# Patient Record
Sex: FEMALE | Race: WHITE | HISPANIC OR LATINO | Employment: PART TIME | URBAN - METROPOLITAN AREA
[De-identification: names, ages, dates, MRNs, and addresses within clinical notes are randomized per-mention and may not be internally consistent; named-entity substitution may affect disease eponyms.]

---

## 2017-01-06 ENCOUNTER — OFFICE VISIT (OUTPATIENT)
Dept: INTERNAL MEDICINE | Facility: CLINIC | Age: 67
End: 2017-01-06

## 2017-01-06 VITALS
DIASTOLIC BLOOD PRESSURE: 90 MMHG | OXYGEN SATURATION: 99 % | HEART RATE: 66 BPM | WEIGHT: 130.3 LBS | SYSTOLIC BLOOD PRESSURE: 130 MMHG | BODY MASS INDEX: 22.37 KG/M2

## 2017-01-06 DIAGNOSIS — Z79.899 HIGH RISK MEDICATION USE: ICD-10-CM

## 2017-01-06 DIAGNOSIS — G47.00 INSOMNIA, UNSPECIFIED TYPE: ICD-10-CM

## 2017-01-06 DIAGNOSIS — E78.5 HYPERLIPIDEMIA, UNSPECIFIED HYPERLIPIDEMIA TYPE: ICD-10-CM

## 2017-01-06 DIAGNOSIS — I10 ESSENTIAL HYPERTENSION: Primary | ICD-10-CM

## 2017-01-06 DIAGNOSIS — M25.552 LEFT HIP PAIN: ICD-10-CM

## 2017-01-06 LAB
ALBUMIN SERPL-MCNC: 4.1 G/DL (ref 3.2–4.8)
ALBUMIN/GLOB SERPL: 2 G/DL (ref 1.5–2.5)
ALP SERPL-CCNC: 48 U/L (ref 25–100)
ALT SERPL-CCNC: 6 U/L (ref 7–40)
AST SERPL-CCNC: 15 U/L (ref 0–33)
BILIRUB SERPL-MCNC: 0.2 MG/DL (ref 0.3–1.2)
BUN SERPL-MCNC: 27 MG/DL (ref 9–23)
BUN/CREAT SERPL: 30 (ref 7–25)
CALCIUM SERPL-MCNC: 10.3 MG/DL (ref 8.7–10.4)
CHLORIDE SERPL-SCNC: 105 MMOL/L (ref 99–109)
CO2 SERPL-SCNC: 24 MMOL/L (ref 20–31)
CREAT SERPL-MCNC: 0.9 MG/DL (ref 0.6–1.3)
GLOBULIN SER CALC-MCNC: 2.1 GM/DL
GLUCOSE SERPL-MCNC: 86 MG/DL (ref 70–100)
MAGNESIUM SERPL-MCNC: 2.2 MG/DL (ref 1.3–2.7)
POTASSIUM SERPL-SCNC: 4.4 MMOL/L (ref 3.5–5.5)
PROT SERPL-MCNC: 6.2 G/DL (ref 5.7–8.2)
SODIUM SERPL-SCNC: 141 MMOL/L (ref 132–146)

## 2017-01-06 PROCEDURE — 99213 OFFICE O/P EST LOW 20 MIN: CPT | Performed by: INTERNAL MEDICINE

## 2017-01-06 NOTE — MR AVS SNAPSHOT
Deidre Gomez   1/6/2017 12:30 PM   Office Visit    Dept Phone:  [phone]   Encounter #:  [id number]    Provider:  Aspen Salas DO   Department:  [facility] INTERNAL MEDICINE AND ENDOCRINOLOGY [place]                Your Full Care Plan              Today's Medication Changes          These changes are accurate as of: 1/6/17  1:00 PM.  If you have any questions, ask your nurse or doctor.               Medication(s)that have changed:     diclofenac 1 % gel gel   Commonly known as:  VOLTAREN   Apply daily as needed for pain   What changed:    - how to take this  - additional instructions   Changed by:  Aspen Salas DO         Stop taking medication(s)listed here:     CloNIDine 0.1 MG tablet   Commonly known as:  CATAPRES   Stopped by:  Aspen Salas DO                Where to Get Your Medications      These medications were sent to [facility] [address] [place] [address] [address] - [phone]  - [phone]   [address] [place] [address]     Phone:  [phone]     diclofenac 1 % gel gel                  Your Updated Medication List          This list is accurate as of: 1/6/17  1:00 PM.  Always use your most recent med list.                ALPRAZolam 0.5 MG tablet   Commonly known as:  XANAX   Take 1 tablet by mouth 2 (two) times a day as needed for anxiety.       azelastine 0.1 % nasal spray   Commonly known as:  ASTELIN       diclofenac 1 % gel gel   Commonly known as:  VOLTAREN   Apply daily as needed for pain       estradiol 1 MG tablet   Commonly known as:  ESTRACE   Take 1 tablet by mouth every night.       losartan 100 MG tablet   Commonly known as:  COZAAR   Take 1 tablet by mouth daily.       zolpidem 10 MG tablet   Commonly known as:  AMBIEN               We Performed the Following     Ambulatory Referral to Orthopedic Surgery     Ambulatory Referral to Sleep Medicine     Comprehensive Metabolic Panel     Magnesium          You Were Diagnosed With        Codes Comments    Essential hypertension    -  Primary ICD-10-CM: I10  ICD-9-CM: 401.9     Hyperlipidemia, unspecified hyperlipidemia type     ICD-10-CM: E78.5  ICD-9-CM: 272.4     Insomnia, unspecified type     ICD-10-CM: G47.00  ICD-9-CM: 780.52     High risk medication use     ICD-10-CM: Z79.899  ICD-9-CM: V58.69     Left hip pain     ICD-10-CM: M25.552  ICD-9-CM: 719.45       Instructions     None    Patient Instructions History      Upcoming Appointments     Visit Type Date Time Department    FOLLOW UP 2017 12:30 PM [facility] [employer]    SUBSEQUENT MEDICARE WELLNESS 3/27/2017  2:00 PM [facility] [employer] Signup     [facility] [employer] allows you to send messages to your doctor, view your test results, renew your prescriptions, schedule appointments, and more. To sign up, go to katena and click on the Sign Up Now link in the New User? box. Enter your [employer] Activation Code exactly as it appears below along with the last four digits of your Social Security Number and your Date of Birth () to complete the sign-up process. If you do not sign up before the expiration date, you must request a new code.    "[employer] Activation Code: [email or web address]  Expires: 2017  5:34 AM    If you have questions, you can email [email or web address] or call [phone] to talk to our [employer] staff. Remember, [employer] is NOT to be used for urgent needs. For medical emergencies, dial 911.               Other Info from Your Visit           Your Appointments     Mar 27, 2017  2:00 PM EDT   Subsequent Medicare Wellness with Aspen Salas DO   [facility] INTERNAL MEDICINE AND ENDOCRINOLOGY [place] (--)    [address] [id number] [phone]              Allergies     Codeine      Olmesartan      Penicillins        Reason for Visit     Hyperlipidemia     Hypertension     Hypothyroidism     Insomnia           Vital Signs      Blood Pressure Pulse Weight Oxygen Saturation Body Mass Index Smoking Status    130/90 66 130 lb 4.8 oz (59.1 kg) 99% 22.37 kg/m2 Current Every Day Smoker      Problems and Diagnoses Noted     High blood pressure    High cholesterol or triglycerides    Difficulty falling or staying asleep    High risk medication use        Left hip pain          No Longer an Issue     Underactive thyroid    Type 2 diabetes

## 2017-02-13 ENCOUNTER — TELEPHONE (OUTPATIENT)
Dept: INTERNAL MEDICINE | Facility: CLINIC | Age: 67
End: 2017-02-13

## 2017-02-13 NOTE — TELEPHONE ENCOUNTER
PT STATES THAT EVER SINCE THEY ATTEMPTED TO DO THE BIOPSY ON HER BREAST HER NIPPLE HAS BEEN HURTING SINCE AND GETTING WORSE.    ALSO SHE WENT FOR MRI OF HIP AND THEY SAID SHE HAD A CYST ON HER HIP

## 2017-02-13 NOTE — TELEPHONE ENCOUNTER
----- Message from Tiesha Hernandez sent at 2/9/2017 11:55 AM EST -----  Contact: PATIENT   RE: BREAST ISSUE SINCE AUGUST 15, 2015    PATIENT WOULD LIKE A RETURN CALL TO DISCUSS THE ISSUE SHE HAS BEEN HAVING WITH HER BREAST.     CALL BACK #[phone]

## 2017-02-14 NOTE — TELEPHONE ENCOUNTER
No redness or any signs of infection, it is just really painful, if she takes her bra off at night it hurts really bad or if someone hugs her it is painful, she had an xray done last week on [address], she was supposed to have the MRI today but had a panic attack and they megan for Friday, but she would like for us to call her in xanax so she will be able to take before the MRI

## 2017-02-15 NOTE — TELEPHONE ENCOUNTER
LVM THAT XANAX HAS BEEN SENT IN TO HER PHARMACY AND TO TRY COOL ICE COMPRESSES ON HER BREAST, IF IT DOESN'T WORK LET US KNOW

## 2017-02-16 RX ORDER — ESTRADIOL 1 MG/1
1 TABLET ORAL NIGHTLY
Qty: 30 TABLET | Refills: 3 | Status: SHIPPED | OUTPATIENT
Start: 2017-02-16 | End: 2017-03-27

## 2017-02-16 RX ORDER — LOSARTAN POTASSIUM 100 MG/1
100 TABLET ORAL DAILY
Qty: 30 TABLET | Refills: 3 | Status: SHIPPED | OUTPATIENT
Start: 2017-02-16 | End: 2017-06-28 | Stop reason: SDUPTHER

## 2017-03-27 ENCOUNTER — OFFICE VISIT (OUTPATIENT)
Dept: INTERNAL MEDICINE | Facility: CLINIC | Age: 67
End: 2017-03-27

## 2017-03-27 VITALS
HEART RATE: 62 BPM | OXYGEN SATURATION: 98 % | BODY MASS INDEX: 22.18 KG/M2 | WEIGHT: 129.2 LBS | SYSTOLIC BLOOD PRESSURE: 130 MMHG | DIASTOLIC BLOOD PRESSURE: 80 MMHG

## 2017-03-27 DIAGNOSIS — I10 ESSENTIAL HYPERTENSION: ICD-10-CM

## 2017-03-27 DIAGNOSIS — F41.9 ANXIETY: ICD-10-CM

## 2017-03-27 DIAGNOSIS — Z00.00 MEDICARE ANNUAL WELLNESS VISIT, SUBSEQUENT: Primary | ICD-10-CM

## 2017-03-27 DIAGNOSIS — Z12.11 COLON CANCER SCREENING: ICD-10-CM

## 2017-03-27 PROCEDURE — 99397 PER PM REEVAL EST PAT 65+ YR: CPT | Performed by: INTERNAL MEDICINE

## 2017-03-27 PROCEDURE — G0444 DEPRESSION SCREEN ANNUAL: HCPCS | Performed by: INTERNAL MEDICINE

## 2017-03-27 PROCEDURE — G0439 PPPS, SUBSEQ VISIT: HCPCS | Performed by: INTERNAL MEDICINE

## 2017-03-27 PROCEDURE — 96160 PT-FOCUSED HLTH RISK ASSMT: CPT | Performed by: INTERNAL MEDICINE

## 2017-03-27 RX ORDER — CHLORAL HYDRATE 500 MG
CAPSULE ORAL
COMMUNITY
End: 2019-07-24

## 2017-03-27 RX ORDER — NEBIVOLOL HYDROCHLORIDE 2.5 MG/1
TABLET ORAL
COMMUNITY
Start: 2017-03-14 | End: 2017-11-29

## 2017-03-27 RX ORDER — ALPRAZOLAM 0.5 MG/1
0.5 TABLET ORAL 2 TIMES DAILY PRN
Qty: 60 TABLET | Refills: 2 | OUTPATIENT
Start: 2017-03-27 | End: 2018-03-21 | Stop reason: SDUPTHER

## 2017-03-27 NOTE — PROGRESS NOTES
ANNUAL WELLNESS VISIT    DRUG AND ALCOHOL USE      tobacco use: Smoked 1 packs per day for 50 years, 1 cup of caff coffee daily    DIET AND PHYSICAL ACTIVITY     Diet: low sodium    Exercise: never   Exercise Details: does not exercise due to hip pain     MOOD DISORDER AND COGNITIVE SCREENING   Depression Screening Tool Used yes - see PHQ-9   Anxiety Screening Tool Used yes     Mini-Cog Performed   Yes    1. Tell Patient 3 Words apple table deedee    2. Administer Clock Test     3. Recall 3 words  apple table deedee    4. Number Correct Items 3    FUNCTIONAL ABILITY AND LEVEL OF SAFETY   Hearing moderate hearing loss     Wears Hearing Aids No       Current Activities Independent      difficulty with walking  - see Funct/Cog Status Intake     Fall Risk Assessment       Has difficulty with walking or balance  Yes         Timed Up and Go (TUG) Test  8 sec.       If >12 sec, normal    ADVANCED DIRECTIVE has an advanced directive - a copy HAS NOT been provided. Have asked the patient to send this to us to add to record    PAIN SCREENING Do you have pain right now? no      If so, 1-10 scale: 0          Do you have pain every day? Yes      Probable chronic pain: Yes     Recent Hospitalizations:  No recent hospitalization(s)..     MEDICATION REVIEW   - updated and reviewed (see Medication List).   - reviewed for potentially harmful drug-disease interactions in the elderly.   - reviewed for high risk medications in the elderly.   - aspirin use: No             Chief Complaint   Patient presents with   • Subsequent Medicare Wellness       History of Present Illness  66 y.o.  presents for Wellness    Review of Systems   Constitutional: Negative for activity change, appetite change, chills, diaphoresis, fatigue, fever and unexpected weight change.   HENT: Negative for congestion, ear discharge, ear pain, mouth sores, nosebleeds, sinus pressure, sneezing and sore throat.    Eyes: Negative for pain, discharge and itching.    Respiratory: Negative for cough, chest tightness, shortness of breath and wheezing.    Cardiovascular: Negative for chest pain, palpitations and leg swelling.   Gastrointestinal: Negative for abdominal pain, constipation, diarrhea, nausea and vomiting.   Endocrine: Negative for cold intolerance, heat intolerance, polydipsia and polyphagia.   Genitourinary: Negative for dysuria, flank pain, frequency, hematuria and urgency.   Musculoskeletal: Negative for arthralgias, back pain, gait problem, myalgias, neck pain and neck stiffness.        Left hip pain   Skin: Negative for color change, pallor and rash.   Neurological: Negative for seizures, speech difficulty, numbness and headaches.   Psychiatric/Behavioral: Negative for agitation, confusion, decreased concentration and sleep disturbance. The patient is not nervous/anxious.     All other ROS reviewed and negative.    [facility]  The following portions of the patient's history were reviewed and updated as appropriate: allergies, current medications, past family history, past medical history, past social history, past surgical history and problem list.      Current Outpatient Prescriptions:   •  ALPRAZolam (XANAX) 0.5 MG tablet, Take 1 tablet by mouth 2 (two) times a day as needed for anxiety., Disp: 60 tablet, Rfl: 0  •  azelastine (ASTELIN) 0.1 % nasal spray, into each nostril 2 (two) times a day., Disp: , Rfl:   •  BIOTIN PO, Take  by mouth., Disp: , Rfl:   •  Cholecalciferol (VITAMIN D3) 1000 UNITS capsule, Take  by mouth., Disp: , Rfl:   •  diclofenac (VOLTAREN) 1 % gel gel, Apply daily as needed for pain, Disp: 100 g, Rfl: 1  •  losartan (COZAAR) 100 MG tablet, Take 1 tablet by mouth Daily., Disp: 30 tablet, Rfl: 3  •  Multiple Vitamins-Minerals (MULTIVITAMIN ADULT PO), Take  by mouth., Disp: , Rfl:   •  Omega-3 Fatty Acids (FISH OIL) 1000 MG capsule capsule, Take  by mouth Daily With Breakfast., Disp: , Rfl:   •  zolpidem (AMBIEN) 10 MG tablet, Take 1 tablet by  mouth every night., Disp: , Rfl:   •  BYSTOLIC 2.5 MG tablet, , Disp: , Rfl:     VITALS:  /80  Pulse 62  Wt 129 lb 3.2 oz (58.6 kg)  SpO2 98%  BMI 22.18 kg/m2    Physical Exam   Constitutional: She appears well-developed.   HENT:   Head: Normocephalic.   Right Ear: External ear normal.   Left Ear: External ear normal.   Nose: Nose normal.   Mouth/Throat: Oropharynx is clear and moist.   Eyes: Conjunctivae are normal. Pupils are equal, round, and reactive to light.   Neck: No JVD present. No thyromegaly present.   Cardiovascular: Normal rate, regular rhythm and normal heart sounds.  Exam reveals no friction rub.    No murmur heard.  Pulmonary/Chest: Effort normal and breath sounds normal. No respiratory distress. She has no wheezes. She has no rales.   Abdominal: Soft. Bowel sounds are normal. She exhibits no distension. There is no tenderness. There is no guarding.   Musculoskeletal: She exhibits no edema or tenderness.   Lymphadenopathy:     She has no cervical adenopathy.   Neurological: She displays normal reflexes. No cranial nerve deficit.   Skin: No rash noted.   Psychiatric: She has a normal mood and affect. Her behavior is normal.   Nursing note and vitals reviewed.      LABS  Results for orders placed or performed in visit on 01/06/17   Comprehensive Metabolic Panel   Result Value Ref Range    Glucose 86 70 - 100 mg/dL    BUN 27 (H) 9 - 23 mg/dL    Creatinine 0.90 0.60 - 1.30 mg/dL    eGFR Non African Am 63 >60 mL/min/1.73    eGFR African Am 76 >60 mL/min/1.73    BUN/Creatinine Ratio 30.0 (H) 7.0 - 25.0    Sodium 141 132 - 146 mmol/L    Potassium 4.4 3.5 - 5.5 mmol/L    Chloride 105 99 - 109 mmol/L    Total CO2 24.0 20.0 - 31.0 mmol/L    Calcium 10.3 8.7 - 10.4 mg/dL    Total Protein 6.2 5.7 - 8.2 g/dL    Albumin 4.10 3.20 - 4.80 g/dL    Globulin 2.1 gm/dL    A/G Ratio 2.0 1.5 - 2.5 g/dL    Total Bilirubin 0.2 (L) 0.3 - 1.2 mg/dL    Alkaline Phosphatase 48 25 - 100 U/L    AST (SGOT) 15 0 - 33 U/L     ALT (SGPT) 6 (L) 7 - 40 U/L   Magnesium   Result Value Ref Range    Magnesium 2.2 1.3 - 2.7 mg/dL       ASSESSMENT/PLAN  Problem List Items Addressed This Visit     None      Visit Diagnoses     Medicare annual wellness visit, subsequent    -  Primary          FOLLOW-UP  RTC  3months

## 2017-03-28 ENCOUNTER — CONSULT (OUTPATIENT)
Dept: SLEEP MEDICINE | Facility: HOSPITAL | Age: 67
End: 2017-03-28

## 2017-03-28 VITALS
WEIGHT: 128.4 LBS | OXYGEN SATURATION: 97 % | HEART RATE: 56 BPM | DIASTOLIC BLOOD PRESSURE: 74 MMHG | HEIGHT: 65 IN | BODY MASS INDEX: 21.39 KG/M2 | SYSTOLIC BLOOD PRESSURE: 144 MMHG

## 2017-03-28 DIAGNOSIS — R29.818 SUSPECTED SLEEP APNEA: ICD-10-CM

## 2017-03-28 DIAGNOSIS — F51.04 CHRONIC INSOMNIA: Primary | ICD-10-CM

## 2017-03-28 DIAGNOSIS — G47.10 HYPERSOMNIA: ICD-10-CM

## 2017-03-28 DIAGNOSIS — G25.81 RESTLESS LEGS SYNDROME (RLS): ICD-10-CM

## 2017-03-28 DIAGNOSIS — G47.61 PERIODIC LIMB MOVEMENT DISORDER (PLMD): ICD-10-CM

## 2017-03-28 PROCEDURE — 99204 OFFICE O/P NEW MOD 45 MIN: CPT | Performed by: INTERNAL MEDICINE
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
## 2017-04-14 RX ORDER — ZOLPIDEM TARTRATE 10 MG/1
TABLET ORAL
Qty: 30 TABLET | Refills: 3 | OUTPATIENT
Start: 2017-04-14 | End: 2017-11-29 | Stop reason: SDUPTHER

## 2017-06-29 RX ORDER — LOSARTAN POTASSIUM 100 MG/1
TABLET ORAL
Qty: 30 TABLET | Refills: 0 | Status: SHIPPED | OUTPATIENT
Start: 2017-06-29 | End: 2017-07-31 | Stop reason: SDUPTHER

## 2017-07-18 ENCOUNTER — OFFICE VISIT (OUTPATIENT)
Dept: INTERNAL MEDICINE | Facility: CLINIC | Age: 67
End: 2017-07-18

## 2017-07-18 VITALS
HEART RATE: 61 BPM | SYSTOLIC BLOOD PRESSURE: 148 MMHG | OXYGEN SATURATION: 99 % | WEIGHT: 123 LBS | BODY MASS INDEX: 20.47 KG/M2 | DIASTOLIC BLOOD PRESSURE: 80 MMHG

## 2017-07-18 DIAGNOSIS — IMO0002 CYST OF NECK: Primary | ICD-10-CM

## 2017-07-18 PROCEDURE — 99213 OFFICE O/P EST LOW 20 MIN: CPT | Performed by: INTERNAL MEDICINE

## 2017-07-18 RX ORDER — METHYLPREDNISOLONE 4 MG/1
TABLET ORAL
COMMUNITY
Start: 2017-07-05 | End: 2017-11-29

## 2017-07-18 RX ORDER — METOPROLOL SUCCINATE 25 MG/1
1 TABLET, EXTENDED RELEASE ORAL DAILY
COMMUNITY
Start: 2017-07-13 | End: 2018-02-19

## 2017-07-18 RX ORDER — TRAMADOL HYDROCHLORIDE 50 MG/1
1 TABLET ORAL AS NEEDED
COMMUNITY
Start: 2017-07-06 | End: 2019-07-24

## 2017-07-18 RX ORDER — BUPROPION HYDROCHLORIDE 150 MG/1
150 TABLET, EXTENDED RELEASE ORAL 2 TIMES DAILY
Qty: 60 TABLET | Refills: 2 | Status: SHIPPED | OUTPATIENT
Start: 2017-07-18 | End: 2018-06-27 | Stop reason: ALTCHOICE

## 2017-07-18 RX ORDER — TIZANIDINE 2 MG/1
1 TABLET ORAL AS NEEDED
COMMUNITY
Start: 2017-04-25 | End: 2019-07-24

## 2017-07-18 NOTE — PROGRESS NOTES
Subjective   Deidre Gomez is a 66 y.o. female.   Chief Complaint   Patient presents with   • Discuss Referral     has benign cyst on back of neck would like to get it removed, discuss starting wellbutrin to quit smoking       History of Present Illness   Cyst on neck for years. It is getting bigger and wants referral to have it removed.  The following portions of the patient's history were reviewed and updated as appropriate: allergies, current medications, past family history, past medical history, past social history, past surgical history and problem list.    Review of Systems   Constitutional: Negative for activity change, appetite change, chills, diaphoresis, fatigue, fever and unexpected weight change.   HENT: Negative for congestion, ear discharge, ear pain, mouth sores, nosebleeds, sinus pressure, sneezing and sore throat.    Eyes: Negative for pain, discharge and itching.   Respiratory: Negative for cough, chest tightness, shortness of breath and wheezing.    Cardiovascular: Negative for chest pain, palpitations and leg swelling.   Gastrointestinal: Negative for abdominal pain, constipation, diarrhea, nausea and vomiting.   Endocrine: Negative for cold intolerance, heat intolerance, polydipsia and polyphagia.   Genitourinary: Negative for dysuria, flank pain, frequency, hematuria and urgency.   Musculoskeletal: Negative for arthralgias, back pain, gait problem, myalgias, neck pain and neck stiffness.   Skin: Negative for color change, pallor and rash.        Cyst on neck   Neurological: Negative for seizures, speech difficulty, numbness and headaches.   Psychiatric/Behavioral: Negative for agitation, confusion, decreased concentration and sleep disturbance. The patient is not nervous/anxious.      /80  Pulse 61  Wt 123 lb (55.8 kg)  SpO2 99%  BMI 20.47 kg/m2    Objective   Physical Exam   Constitutional: She appears well-developed.   HENT:   Head: Normocephalic.   Right Ear: External ear  normal.   Left Ear: External ear normal.   Nose: Nose normal.   Mouth/Throat: Oropharynx is clear and moist.   Eyes: Conjunctivae are normal. Pupils are equal, round, and reactive to light.   Neck: No JVD present. No thyromegaly present.   Cardiovascular: Normal rate, regular rhythm and normal heart sounds.  Exam reveals no friction rub.    No murmur heard.  Pulmonary/Chest: No respiratory distress. She has no wheezes. She has no rales.   Abdominal: She exhibits no distension. There is no tenderness. There is no guarding.   Musculoskeletal: She exhibits no edema or tenderness.   Lymphadenopathy:     She has no cervical adenopathy.   Neurological: She displays normal reflexes. No cranial nerve deficit.   Skin: No rash noted.        Psychiatric: Her behavior is normal.   Nursing note and vitals reviewed.      Assessment/Plan   Deidre was seen today for discuss referral.    Diagnoses and all orders for this visit:    Cyst of neck  -     Ambulatory Referral to Dermatology    Other orders  -     buPROPion SR (WELLBUTRIN SR) 150 MG 12 hr tablet; Take 1 tablet by mouth 2 (Two) Times a Day.    for smoking cessation

## 2017-07-24 ENCOUNTER — TELEPHONE (OUTPATIENT)
Dept: INTERNAL MEDICINE | Facility: CLINIC | Age: 67
End: 2017-07-24

## 2017-07-31 RX ORDER — LOSARTAN POTASSIUM 100 MG/1
TABLET ORAL
Qty: 30 TABLET | Refills: 0 | Status: SHIPPED | OUTPATIENT
Start: 2017-07-31 | End: 2017-07-31 | Stop reason: SDUPTHER

## 2017-07-31 RX ORDER — LOSARTAN POTASSIUM 100 MG/1
100 TABLET ORAL DAILY
Qty: 30 TABLET | Refills: 0 | Status: SHIPPED | OUTPATIENT
Start: 2017-07-31 | End: 2017-10-11 | Stop reason: SDUPTHER

## 2017-09-08 ENCOUNTER — OFFICE VISIT (OUTPATIENT)
Dept: INTERNAL MEDICINE | Facility: CLINIC | Age: 67
End: 2017-09-08

## 2017-09-08 DIAGNOSIS — Z48.02 VISIT FOR SUTURE REMOVAL: Primary | ICD-10-CM

## 2017-09-08 PROCEDURE — 99212 OFFICE O/P EST SF 10 MIN: CPT | Performed by: NURSE PRACTITIONER

## 2017-09-08 NOTE — PROGRESS NOTES
Suture Removal  Date/Time: 9/8/2017 6:42 PM  Performed by: SOFY AMES  Authorized by: SOFY AMES   Consent: Verbal consent obtained.  Consent given by: patient  Patient understanding: patient states understanding of the procedure being performed  Body area: head/neck  Location details: scalp  Wound Appearance: clean  Sutures Removed: 1  Patient tolerance: Patient tolerated the procedure well with no immediate complications  Comments: 1 running stitch removed from 3.5-4 cm incision that was well approximated, healing well, no drainage.        Patient requested suture removal from excision of sebaceous cyst that was excised 10 days ago. It has not bothered other than a little itching.  1 running stitch removed from 3.5-4 cm incision that was well approximated, healing well, no drainage.  Home care discussed.  Sofy Ames, APRN

## 2017-10-11 RX ORDER — LOSARTAN POTASSIUM 100 MG/1
TABLET ORAL
Qty: 30 TABLET | Refills: 0 | Status: SHIPPED | OUTPATIENT
Start: 2017-10-11 | End: 2017-11-09 | Stop reason: SDUPTHER

## 2017-11-09 RX ORDER — LOSARTAN POTASSIUM 100 MG/1
TABLET ORAL
Qty: 30 TABLET | Refills: 0 | Status: SHIPPED | OUTPATIENT
Start: 2017-11-09 | End: 2017-12-12 | Stop reason: SDUPTHER

## 2017-11-10 RX ORDER — ZOLPIDEM TARTRATE 10 MG/1
TABLET ORAL
Qty: 30 TABLET | Refills: 3 | OUTPATIENT
Start: 2017-11-10

## 2017-11-29 ENCOUNTER — OFFICE VISIT (OUTPATIENT)
Dept: INTERNAL MEDICINE | Facility: CLINIC | Age: 67
End: 2017-11-29

## 2017-11-29 VITALS
WEIGHT: 125 LBS | OXYGEN SATURATION: 99 % | DIASTOLIC BLOOD PRESSURE: 92 MMHG | HEIGHT: 65 IN | HEART RATE: 79 BPM | BODY MASS INDEX: 20.83 KG/M2 | SYSTOLIC BLOOD PRESSURE: 158 MMHG

## 2017-11-29 DIAGNOSIS — I10 ESSENTIAL HYPERTENSION: ICD-10-CM

## 2017-11-29 DIAGNOSIS — E78.49 OTHER HYPERLIPIDEMIA: ICD-10-CM

## 2017-11-29 DIAGNOSIS — F32.89 OTHER DEPRESSION: ICD-10-CM

## 2017-11-29 DIAGNOSIS — N64.4 BREAST PAIN, LEFT: Primary | ICD-10-CM

## 2017-11-29 PROCEDURE — 99214 OFFICE O/P EST MOD 30 MIN: CPT | Performed by: INTERNAL MEDICINE

## 2017-11-29 RX ORDER — NEBIVOLOL HYDROCHLORIDE 5 MG/1
TABLET ORAL
COMMUNITY
Start: 2017-11-03 | End: 2018-08-07 | Stop reason: SDUPTHER

## 2017-11-29 RX ORDER — ZOLPIDEM TARTRATE 10 MG/1
TABLET ORAL
Qty: 30 TABLET | Refills: 3 | Status: SHIPPED | OUTPATIENT
Start: 2017-11-29 | End: 2018-04-17 | Stop reason: SDUPTHER

## 2017-12-12 RX ORDER — LOSARTAN POTASSIUM 100 MG/1
TABLET ORAL
Qty: 30 TABLET | Refills: 2 | Status: SHIPPED | OUTPATIENT
Start: 2017-12-12 | End: 2018-03-12 | Stop reason: SDUPTHER

## 2017-12-19 ENCOUNTER — TELEPHONE (OUTPATIENT)
Dept: INTERNAL MEDICINE | Facility: CLINIC | Age: 67
End: 2017-12-19

## 2018-01-22 ENCOUNTER — OFFICE VISIT (OUTPATIENT)
Dept: INTERNAL MEDICINE | Facility: CLINIC | Age: 68
End: 2018-01-22

## 2018-01-22 VITALS
HEART RATE: 76 BPM | SYSTOLIC BLOOD PRESSURE: 144 MMHG | HEIGHT: 65 IN | OXYGEN SATURATION: 98 % | DIASTOLIC BLOOD PRESSURE: 84 MMHG | BODY MASS INDEX: 21.14 KG/M2 | WEIGHT: 126.9 LBS

## 2018-01-22 DIAGNOSIS — I10 ESSENTIAL HYPERTENSION: ICD-10-CM

## 2018-01-22 DIAGNOSIS — E83.52 HYPERCALCEMIA: ICD-10-CM

## 2018-01-22 DIAGNOSIS — E55.9 VITAMIN D DEFICIENCY: ICD-10-CM

## 2018-01-22 DIAGNOSIS — F32.89 OTHER DEPRESSION: Primary | ICD-10-CM

## 2018-01-22 PROCEDURE — 99214 OFFICE O/P EST MOD 30 MIN: CPT | Performed by: INTERNAL MEDICINE

## 2018-01-22 NOTE — PROGRESS NOTES
Subjective   Deidre Gomez is a 67 y.o. female.   Chief Complaint   Patient presents with   • Follow-up     F/u visit. Pt stated no new sx's or concerns       Hypertension   This is a chronic problem. The current episode started more than 1 year ago. Pertinent negatives include no chest pain, headaches, neck pain, palpitations or shortness of breath. The current treatment provides moderate improvement.   Depression   Visit Type: follow-up  Patient presents with the following symptoms: depressed mood.  Patient is not experiencing: confusion, decreased concentration, nervousness/anxiety, palpitations, shortness of breath, suicidal ideas, suicidal planning, thoughts of death and weight loss.     Left breast pain x 6 weeks. No trauma. Hx breast cyst. Did not have he yearly mammogram in June of this year.  Has not taken her BP meds today    The following portions of the patient's history were reviewed and updated as appropriate: allergies, current medications, past family history, past medical history, past social history, past surgical history and problem list.    Review of Systems   Constitutional: Negative for activity change, appetite change, chills, diaphoresis, fatigue, fever, unexpected weight change and weight loss.   HENT: Negative for congestion, ear discharge, ear pain, mouth sores, nosebleeds, sinus pressure, sneezing and sore throat.    Eyes: Negative for pain, discharge and itching.   Respiratory: Negative for cough, chest tightness, shortness of breath and wheezing.    Cardiovascular: Negative for chest pain, palpitations and leg swelling.   Gastrointestinal: Negative for abdominal pain, constipation, diarrhea, nausea and vomiting.   Endocrine: Negative for cold intolerance, heat intolerance, polydipsia and polyphagia.   Genitourinary: Negative for dysuria, flank pain, frequency, hematuria and urgency.   Musculoskeletal: Negative for arthralgias, back pain, gait problem, myalgias, neck pain and neck  "stiffness.   Skin: Negative for color change, pallor and rash.   Neurological: Negative for seizures, speech difficulty, numbness and headaches.   Psychiatric/Behavioral: Negative for agitation, confusion, decreased concentration, sleep disturbance and suicidal ideas. The patient is not nervous/anxious.    /84  Pulse 76  Ht 163.8 cm (64.5\")  Wt 57.6 kg (126 lb 14.4 oz)  SpO2 98%  BMI 21.45 kg/m2      Objective   Physical Exam   Constitutional: She appears well-developed.   HENT:   Head: Normocephalic.   Right Ear: External ear normal.   Left Ear: External ear normal.   Nose: Nose normal.   Mouth/Throat: Oropharynx is clear and moist.   Eyes: Conjunctivae are normal. Pupils are equal, round, and reactive to light.   Neck: No JVD present. No thyromegaly present.   Cardiovascular: Normal rate, regular rhythm and normal heart sounds.  Exam reveals no friction rub.    No murmur heard.  Pulmonary/Chest: Effort normal and breath sounds normal. No respiratory distress. She has no wheezes. She has no rales.   Abdominal: Soft. Bowel sounds are normal. She exhibits no distension. There is no tenderness. There is no guarding.   Musculoskeletal: She exhibits no edema or tenderness.   Lymphadenopathy:     She has no cervical adenopathy.   Neurological: She displays normal reflexes. No cranial nerve deficit.   Skin: No rash noted.   Psychiatric: Her behavior is normal.   Nursing note and vitals reviewed.      Assessment/Plan   Deidre was seen today for breast pain and medication problem.    Diagnoses and all orders for this visit:    Breast pain, left  -     Mammo Diagnostic Bilateral With CAD; Future    Other depression  -     Ambulatory Referral to Psychiatry  She has tried multiple meds but has \"reaction to most\". Has tolerated wellbutrin but does not do well with higher dose. She is tolerating the once a day. She feels like is doing better.   Anxiety with attacks  Using xanax once a day  Essential hypertension  Up " today but needs to take her meds.  Other hyperlipidemia  stable  Other orders  -     zolpidem (AMBIEN) 10 MG tablet; One at bedtime prn sleep  The patient has read and signed the [facility] Controlled Substance Contract.  I will continue to see patient for regular follow up appointments.  They are well controlled on their medication.  SCOTT is updated every 3 months. The patient is aware of the potential for addiction and dependence.

## 2018-01-23 LAB
25(OH)D3+25(OH)D2 SERPL-MCNC: 14.8 NG/ML
ALBUMIN SERPL-MCNC: 4.7 G/DL (ref 3.2–4.8)
ALBUMIN/GLOB SERPL: 2.8 G/DL (ref 1.5–2.5)
ALP SERPL-CCNC: 70 U/L (ref 25–100)
ALT SERPL-CCNC: 5 U/L (ref 7–40)
AST SERPL-CCNC: 19 U/L (ref 0–33)
BILIRUB SERPL-MCNC: 0.3 MG/DL (ref 0.3–1.2)
BUN SERPL-MCNC: 29 MG/DL (ref 9–23)
BUN/CREAT SERPL: 26.4 (ref 7–25)
CALCIUM SERPL-MCNC: 10 MG/DL (ref 8.7–10.4)
CHLORIDE SERPL-SCNC: 107 MMOL/L (ref 99–109)
CO2 SERPL-SCNC: 25 MMOL/L (ref 20–31)
CREAT SERPL-MCNC: 1.1 MG/DL (ref 0.6–1.3)
GLOBULIN SER CALC-MCNC: 1.7 GM/DL
GLUCOSE SERPL-MCNC: 94 MG/DL (ref 70–100)
Lab: NORMAL
POTASSIUM SERPL-SCNC: 4.3 MMOL/L (ref 3.5–5.5)
PROT SERPL-MCNC: 6.4 G/DL (ref 5.7–8.2)
SODIUM SERPL-SCNC: 140 MMOL/L (ref 132–146)

## 2018-01-25 LAB
CALCIUM SERPL-MCNC: 10.1 MG/DL (ref 8.7–10.3)
INTACT PTH: NORMAL
PTH-INTACT SERPL-MCNC: 33 PG/ML (ref 15–65)
REQUEST PROBLEM: NORMAL
SPECIMEN STATUS: NORMAL

## 2018-01-25 RX ORDER — ERGOCALCIFEROL 1.25 MG/1
CAPSULE ORAL
Qty: 8 CAPSULE | Refills: 0 | Status: SHIPPED | OUTPATIENT
Start: 2018-01-25 | End: 2018-06-27

## 2018-01-26 ENCOUNTER — TELEPHONE (OUTPATIENT)
Dept: INTERNAL MEDICINE | Facility: CLINIC | Age: 68
End: 2018-01-26

## 2018-01-26 NOTE — TELEPHONE ENCOUNTER
PT WAS PREVIOUSLY PRESCRIBED VIT D3 BUY A PREVIOUS PROVIDER. THE VIT D CALLED IN YESTERDAY WAS FOR D2. PT WOULD LIKE TO MAKE SURE THIS IS CORRECT. PT ISN'T SURE IF THIS WAS A MISTAKE AS SHE HAS ALWAYS TAKEN THE D3.

## 2018-01-31 RX ORDER — IBUPROFEN 800 MG/1
TABLET ORAL
Qty: 30 TABLET | Refills: 0 | Status: SHIPPED | OUTPATIENT
Start: 2018-01-31 | End: 2018-12-12 | Stop reason: SDUPTHER

## 2018-01-31 NOTE — TELEPHONE ENCOUNTER
"Refilled Ibuprofen 800 MG, Patient wanted it noted that she \"hardly ever takes it\" she sprained her knee and it is the only things that helps with the pain. She appreciates the RX.   RX E- scripted Per Dr. Salas VO  "

## 2018-02-14 ENCOUNTER — OFFICE VISIT (OUTPATIENT)
Dept: PSYCHIATRY | Facility: CLINIC | Age: 68
End: 2018-02-14

## 2018-02-14 DIAGNOSIS — F31.30 BIPOLAR I DISORDER, MOST RECENT EPISODE DEPRESSED (HCC): Primary | ICD-10-CM

## 2018-02-14 PROCEDURE — 90791 PSYCH DIAGNOSTIC EVALUATION: CPT | Performed by: PSYCHOLOGIST

## 2018-02-15 NOTE — PROGRESS NOTES
PROGRESS NOTE    Data:  Deidre Gomez is a 67 y.o. female who met with the undersigned for a scheduled individualoutpatient psychotherapy session from 1:00 - 1:45pm.      Clinical Maneuvering/Intervention:      Pt talked about struggling with depression and anxiety due to having bipolar disorder. She is from [place], has a history of experiences with controlling men, has a history of drug abuse, and has a history of manic and depressive episodes. A psychological evaluation was continued in order to assess past and current level of functioning. Areas assessed included, but were not limited to: perception of social support, perception of ability to face and deal with challenges in life (positive functioning), anxiety symptoms, depressive symptoms, perspective on beliefs/belief system, coping skills for stress, intelligence level, etc. Therapeutic rapport was initiated. She was open about how she tends to get along better with men than woman so therapeutic rapport may need additional work/attention. Interventions conducted today were geared towards helping the pt identify what she needs in life and what sort of help she needs in counseling. She expressed that she does not feel like she has much purpose in life. It was also noted that her self-esteem is quite low. Identifying and developing a sense of purpose in life was initiated. Education was also provided as to the nature of counseling and what to expect in subsequent sessions. A treatment plan was initiated tailored to meeting pt’s presenting needs. The pt was encouraged to return for additional sessions and agreed to do so.                Mental Status Exam  Hygiene:  good  Dress: normal  Attitude:  Cooperative   Motor Activity: normal  Speech: normal  Mood:  depressed  Affect:  congruent  Thought Processes: normal  Thought Content:  normal  Suicidal Thoughts:  not endorsed  Homicidal Thoughts:  not endorsed  Crisis Safety Plan: not needed   Hallucinations:   none      Patient's Support Network Includes:  family, friends      Progress toward goal: there is evidence to suggest that she is taking measures to improve the quality of her life as she recently got back on Wellbutrin and is starting counseling.      Functional Status: moderate      Prognosis: good with medication and psychotherapy    Assessment      The pt presented as depressed, hopeless that her life will improve, and to have a low opinion of herself. Her sense of purpose in life is also rather low.      Plan      In order to diminish symptoms of depression, the pt will taking medication to manage her mood (ongoing) and start giving more thought about her purpose in life that includes how she was to be more of service to others (this week).     Claudia Hdez, PhD, LP

## 2018-02-19 ENCOUNTER — OFFICE VISIT (OUTPATIENT)
Dept: INTERNAL MEDICINE | Facility: CLINIC | Age: 68
End: 2018-02-19

## 2018-02-19 VITALS
HEART RATE: 68 BPM | OXYGEN SATURATION: 99 % | BODY MASS INDEX: 22.32 KG/M2 | DIASTOLIC BLOOD PRESSURE: 110 MMHG | WEIGHT: 132.1 LBS | SYSTOLIC BLOOD PRESSURE: 160 MMHG

## 2018-02-19 DIAGNOSIS — Z78.0 POSTMENOPAUSAL: ICD-10-CM

## 2018-02-19 DIAGNOSIS — F32.89 OTHER DEPRESSION: ICD-10-CM

## 2018-02-19 DIAGNOSIS — I15.8 OTHER SECONDARY HYPERTENSION: Primary | ICD-10-CM

## 2018-02-19 DIAGNOSIS — I10 ESSENTIAL HYPERTENSION: ICD-10-CM

## 2018-02-19 PROCEDURE — 99214 OFFICE O/P EST MOD 30 MIN: CPT | Performed by: INTERNAL MEDICINE

## 2018-02-19 RX ORDER — METHOCARBAMOL 750 MG/1
TABLET, FILM COATED ORAL
COMMUNITY
Start: 2018-02-02 | End: 2019-07-24

## 2018-02-19 RX ORDER — HYDRALAZINE HYDROCHLORIDE 25 MG/1
25 TABLET, FILM COATED ORAL 3 TIMES DAILY
Qty: 30 TABLET | Refills: 1 | Status: SHIPPED | OUTPATIENT
Start: 2018-02-19 | End: 2018-06-04

## 2018-02-19 NOTE — PROGRESS NOTES
Subjective   Deidre Gomez is a 67 y.o. female.     Depression   Visit Type: follow-up  Patient presents with the following symptoms: depressed mood.  Patient is not experiencing: palpitations, panic, restlessness, shortness of breath, suicidal ideas, suicidal planning and thoughts of death.  Frequency of symptoms: most days     Hypertension   This is a chronic problem. The current episode started more than 1 year ago. The problem is uncontrolled. Pertinent negatives include no chest pain, headaches, neck pain, palpitations or shortness of breath. Compliance problems: has side effects with most of the antihypertesnive meds.         The following portions of the patient's history were reviewed and updated as appropriate: allergies, current medications, past family history, past medical history, past social history, past surgical history and problem list.    Review of Systems   Constitutional: Negative for activity change, appetite change, chills, diaphoresis, fatigue, fever and unexpected weight change.   HENT: Negative for congestion, ear discharge, ear pain, mouth sores, nosebleeds, sinus pressure, sneezing and sore throat.    Eyes: Negative for pain, discharge and itching.   Respiratory: Negative for cough, chest tightness, shortness of breath and wheezing.    Cardiovascular: Negative for chest pain, palpitations and leg swelling.   Gastrointestinal: Negative for abdominal pain, constipation, diarrhea, nausea and vomiting.   Endocrine: Negative for cold intolerance, heat intolerance, polydipsia and polyphagia.   Genitourinary: Negative for dysuria, flank pain, frequency, hematuria and urgency.   Musculoskeletal: Negative for arthralgias, back pain, gait problem, myalgias, neck pain and neck stiffness.   Skin: Negative for color change, pallor and rash.   Neurological: Negative for seizures, speech difficulty, numbness and headaches.   Psychiatric/Behavioral: Negative for agitation, sleep disturbance and  suicidal ideas.        Depression     BP (!) 160/110  Pulse 68  Wt 59.9 kg (132 lb 1.6 oz)  SpO2 99%  BMI 22.32 kg/m2    Objective   Physical Exam   Constitutional: She is oriented to person, place, and time. She appears well-developed.   HENT:   Head: Normocephalic.   Right Ear: External ear normal.   Left Ear: External ear normal.   Nose: Nose normal.   Mouth/Throat: Oropharynx is clear and moist.   Eyes: Conjunctivae are normal. Pupils are equal, round, and reactive to light.   Neck: No JVD present. No thyromegaly present.   Cardiovascular: Normal rate, regular rhythm and normal heart sounds.  Exam reveals no friction rub.    No murmur heard.  Pulmonary/Chest: Effort normal and breath sounds normal. No respiratory distress. She has no wheezes. She has no rales.   Abdominal: Soft. Bowel sounds are normal. She exhibits no distension. There is no tenderness. There is no guarding.   Musculoskeletal: She exhibits no edema or tenderness.   Lymphadenopathy:     She has no cervical adenopathy.   Neurological: She is alert and oriented to person, place, and time. She has normal reflexes. She displays normal reflexes. No cranial nerve deficit.   Skin: No rash noted.   Psychiatric: Her behavior is normal.   Nursing note and vitals reviewed.      Assessment/Plan   Deidre was seen today for depression and hypertension.    Diagnoses and all orders for this visit:    Other secondary hypertension  -     hydrALAZINE (APRESOLINE) 25 MG tablet; Take 1 tablet by mouth 3 (Three) Times a Day.  BP has been hard to control. Multiple medications = side effects so she will just quit taking them. She is tolerating losartan. Will add low dose of hydralazine 25 mg po qd which is one of the few meds we have not tried.  3-4 wekk f/u  Essential hypertension    Other depression    with hx bipolar. Seeing Psy. Have 4 f/u  Sessions  jeff with Dr. Hdez. Feels like her Wellbutrin is helping some.

## 2018-03-12 ENCOUNTER — HOSPITAL ENCOUNTER (OUTPATIENT)
Dept: ULTRASOUND IMAGING | Facility: HOSPITAL | Age: 68
Discharge: HOME | End: 2018-03-12

## 2018-03-12 ENCOUNTER — HOSPITAL ENCOUNTER (OUTPATIENT)
Dept: MAMMOGRAPHY | Facility: HOSPITAL | Age: 68
Discharge: HOME | End: 2018-03-12
Attending: INTERNAL MEDICINE | Admitting: INTERNAL MEDICINE

## 2018-03-12 DIAGNOSIS — N64.4 BREAST PAIN, LEFT: ICD-10-CM

## 2018-03-12 PROCEDURE — G0279 TOMOSYNTHESIS, MAMMO: HCPCS | Performed by: RADIOLOGY

## 2018-03-12 PROCEDURE — 76642 ULTRASOUND BREAST LIMITED: CPT | Performed by: RADIOLOGY

## 2018-03-12 PROCEDURE — 77066 DX MAMMO INCL CAD BI: CPT | Performed by: RADIOLOGY

## 2018-03-12 PROCEDURE — G0279 TOMOSYNTHESIS, MAMMO: HCPCS

## 2018-03-12 PROCEDURE — 76642 ULTRASOUND BREAST LIMITED: CPT

## 2018-03-12 PROCEDURE — 77066 DX MAMMO INCL CAD BI: CPT

## 2018-03-12 RX ORDER — LOSARTAN POTASSIUM 100 MG/1
TABLET ORAL
Qty: 30 TABLET | Refills: 2 | Status: SHIPPED | OUTPATIENT
Start: 2018-03-12 | End: 2018-06-04 | Stop reason: SDUPTHER

## 2018-03-16 ENCOUNTER — APPOINTMENT (OUTPATIENT)
Dept: BONE DENSITY | Facility: HOSPITAL | Age: 68
End: 2018-03-16
Attending: INTERNAL MEDICINE

## 2018-03-19 ENCOUNTER — TELEPHONE (OUTPATIENT)
Dept: INTERNAL MEDICINE | Facility: CLINIC | Age: 68
End: 2018-03-19

## 2018-03-21 ENCOUNTER — OFFICE VISIT (OUTPATIENT)
Dept: INTERNAL MEDICINE | Facility: CLINIC | Age: 68
End: 2018-03-21

## 2018-03-21 DIAGNOSIS — F41.9 ANXIETY: ICD-10-CM

## 2018-03-21 DIAGNOSIS — R92.1 BREAST CALCIFICATION SEEN ON MAMMOGRAM: ICD-10-CM

## 2018-03-21 DIAGNOSIS — I10 ESSENTIAL HYPERTENSION: Primary | ICD-10-CM

## 2018-03-21 DIAGNOSIS — E55.9 VITAMIN D DEFICIENCY: ICD-10-CM

## 2018-03-21 PROCEDURE — 99214 OFFICE O/P EST MOD 30 MIN: CPT | Performed by: INTERNAL MEDICINE

## 2018-03-21 RX ORDER — ALPRAZOLAM 0.5 MG/1
0.5 TABLET ORAL 2 TIMES DAILY PRN
Qty: 60 TABLET | Refills: 2 | OUTPATIENT
Start: 2018-03-21 | End: 2018-03-21 | Stop reason: SDUPTHER

## 2018-03-21 RX ORDER — ALPRAZOLAM 0.5 MG/1
0.5 TABLET ORAL 2 TIMES DAILY PRN
Qty: 60 TABLET | Refills: 2 | Status: SHIPPED | OUTPATIENT
Start: 2018-03-21 | End: 2018-06-27 | Stop reason: SDUPTHER

## 2018-03-21 NOTE — PROGRESS NOTES
Subjective   Deidre Gomez is a 67 y.o. female.   Chief Complaint   Patient presents with   • Hypertension       History of Present Illness   Hypertension is improving but not at goal. Given her history of severe reactions to hypertensive medications. Will continue to monitor BP . She is feeling better with vitamin d replacement.  Discussed most recent mammogram findings and that a biopsy is recommended. She had prior biopsies in the past and is reluctant to undergo another biopsy.   The following portions of the patient's history were reviewed and updated as appropriate: allergies, current medications, past family history, past medical history, past social history, past surgical history and problem list.    Review of Systems   Constitutional: Negative for activity change, appetite change, chills, diaphoresis, fatigue, fever and unexpected weight change.   HENT: Negative for congestion, ear discharge, ear pain, mouth sores, nosebleeds, sinus pressure, sneezing and sore throat.    Eyes: Negative for pain, discharge and itching.   Respiratory: Negative for cough, chest tightness, shortness of breath and wheezing.    Cardiovascular: Negative for chest pain, palpitations and leg swelling.   Gastrointestinal: Negative for abdominal pain, constipation, diarrhea, nausea and vomiting.   Endocrine: Negative for cold intolerance, heat intolerance, polydipsia and polyphagia.   Genitourinary: Negative for dysuria, flank pain, frequency, hematuria and urgency.   Musculoskeletal: Negative for arthralgias, back pain, gait problem, myalgias, neck pain and neck stiffness.   Skin: Negative for color change, pallor and rash.   Neurological: Negative for seizures, speech difficulty, numbness and headaches.   Psychiatric/Behavioral: Negative for agitation, confusion, decreased concentration and sleep disturbance. The patient is not nervous/anxious.      /84   Pulse 73   Wt 61.8 kg (136 lb 3.2 oz)   LMP  (LMP Unknown)    SpO2 99%   BMI 23.02 kg/m²     Objective   Physical Exam   Constitutional: She is oriented to person, place, and time. She appears well-developed.   HENT:   Head: Normocephalic.   Right Ear: External ear normal.   Left Ear: External ear normal.   Nose: Nose normal.   Mouth/Throat: Oropharynx is clear and moist.   Eyes: Conjunctivae are normal. Pupils are equal, round, and reactive to light.   Neck: No JVD present. No thyromegaly present.   Cardiovascular: Normal rate, regular rhythm and normal heart sounds.  Exam reveals no friction rub.    No murmur heard.  Pulmonary/Chest: Effort normal and breath sounds normal. No respiratory distress. She has no wheezes. She has no rales.   Abdominal: Soft. Bowel sounds are normal. She exhibits no distension. There is no tenderness. There is no guarding.   Musculoskeletal: She exhibits no edema or tenderness.   Lymphadenopathy:     She has no cervical adenopathy.   Neurological: She is oriented to person, place, and time. She displays normal reflexes. No cranial nerve deficit.   Skin: No rash noted.   Psychiatric: Her behavior is normal.   Nursing note and vitals reviewed.      Assessment/Plan   Deidre was seen today for hypertension.    Diagnoses and all orders for this visit:    Essential hypertension  Continue current medications. CMP and lipids at next visit  Vitamin D deficiency  -     Vitamin D 25 Hydroxy  Will adjust dosage based on level.   Breast calcification seen on mammogram    Us Breast Bilateral Limited  She does not want to have the biopsy at [facility], [place].. Wants a second opinion. Will send to Dr. Mayra Ibarra.   Result Date: 3/12/2018  Calcifications deserving of stereotactic biopsy bilaterally   BI-RADS CATEGORY:  4, SUSPICIOUS   RECOMMENDATION:  2 site stereotactic biopsy (one site each breast)  CAD was utilized.  The standard false-negative rate of mammography is between 10% and 25%. Complex patterns or increased breast density will markedly elevate the  false-negative rate of mammography.    A letter, in lay terminology, with the results of this exam was given to the patient at the time of the visit. ________________________________________________________________________ _______ Physicians Order  Stereotactic Breast Biopsy  Diagnosis: Abnormal Mammogram  This report was finalized on 3/12/2018 2:42 PM by Dr. Angley Montejo MD.      Mammo Diagnostic Digital Tomosynthesis Bilateral With Cad    Result Date: 3/12/2018  Calcifications deserving of stereotactic biopsy bilaterally   BI-RADS CATEGORY:  4, SUSPICIOUS   RECOMMENDATION:  2 site stereotactic biopsy (one site each breast)  CAD was utilized.  The standard false-negative rate of mammography is between 10% and 25%. Complex patterns or increased breast density will markedly elevate the false-negative rate of mammography.    A letter, in lay terminology, with the results of this exam was given to the patient at the time of the visit. ________________________________________________________________________ _______ Physicians Order  Stereotactic Breast Biopsy  Diagnosis: Abnormal Mammogram  This report was finalized on 3/12/2018 2:42 PM by Dr. Angely Montejo MD.

## 2018-03-23 VITALS
BODY MASS INDEX: 23.02 KG/M2 | SYSTOLIC BLOOD PRESSURE: 135 MMHG | WEIGHT: 136.2 LBS | DIASTOLIC BLOOD PRESSURE: 84 MMHG | HEART RATE: 73 BPM | OXYGEN SATURATION: 99 %

## 2018-03-23 LAB — 25(OH)D3+25(OH)D2 SERPL-MCNC: 48.8 NG/ML (ref 30–100)

## 2018-03-26 ENCOUNTER — TELEPHONE (OUTPATIENT)
Dept: INTERNAL MEDICINE | Facility: CLINIC | Age: 68
End: 2018-03-26

## 2018-04-03 ENCOUNTER — APPOINTMENT (OUTPATIENT)
Dept: BONE DENSITY | Facility: HOSPITAL | Age: 68
End: 2018-04-03
Attending: INTERNAL MEDICINE

## 2018-04-13 ENCOUNTER — TELEPHONE (OUTPATIENT)
Dept: INTERNAL MEDICINE | Facility: CLINIC | Age: 68
End: 2018-04-13

## 2018-04-13 NOTE — TELEPHONE ENCOUNTER
----- Message from Aspen Salas DO sent at 4/6/2018  8:10 AM EDT -----  Regarding: FW: 2nd Opinion Referral  Can you call Deidre and let know GURVINDER is trying to reach her so she can get a second opinion on breast biopsy.  ----- Message -----  From: Jennifer Zarco  Sent: 4/4/2018   3:22 PM  To: Aspen Salas DO  Subject: 2nd Opinion Referral                             Dr. Salas,    Just wanted to update you on this referral.  Patient was contacted on Monday 4/2/18.  They left her a vmsg & the schedulers will give her a call again.  But so far she has not been scheduled.      NCB

## 2018-04-16 ENCOUNTER — TELEPHONE (OUTPATIENT)
Dept: INTERNAL MEDICINE | Facility: CLINIC | Age: 68
End: 2018-04-16

## 2018-04-18 ENCOUNTER — TELEPHONE (OUTPATIENT)
Dept: INTERNAL MEDICINE | Facility: CLINIC | Age: 68
End: 2018-04-18

## 2018-04-18 RX ORDER — ZOLPIDEM TARTRATE 10 MG/1
TABLET ORAL
Qty: 30 TABLET | Refills: 3 | OUTPATIENT
Start: 2018-04-18 | End: 2018-08-17 | Stop reason: SDUPTHER

## 2018-04-18 NOTE — TELEPHONE ENCOUNTER
----- Message from Aspen Salas DO sent at 4/13/2018  3:31 PM EDT -----  Regarding: FW: 2nd Opinion Appointment  Call and find out why she no showed  ----- Message -----  From: Jennifer Zarco  Sent: 4/13/2018   1:59 PM  To: Aspen Salas DO  Subject: 2nd Opinion Appointment                          Dr. Salas,    The patient had an appointment for Monday April 9.  But she was a no show for the appointment.  I wanted to let you know. If you want me to do anything else with this referral just let me know.      Thanks.    NCB

## 2018-04-24 ENCOUNTER — HOSPITAL ENCOUNTER (OUTPATIENT)
Dept: BONE DENSITY | Facility: HOSPITAL | Age: 68
Discharge: HOME | End: 2018-04-24
Attending: INTERNAL MEDICINE | Admitting: INTERNAL MEDICINE

## 2018-04-24 DIAGNOSIS — Z78.0 POSTMENOPAUSAL: ICD-10-CM

## 2018-04-24 PROCEDURE — 77080 DXA BONE DENSITY AXIAL: CPT

## 2018-05-08 ENCOUNTER — TELEPHONE (OUTPATIENT)
Dept: MAMMOGRAPHY | Facility: HOSPITAL | Age: 68
End: 2018-05-08

## 2018-05-08 NOTE — TELEPHONE ENCOUNTER
Spoke to patient regarding scheduling bilateral stereo biopsies. Patient has decided not to have biopsies at this time. Patient stated she will schedule a 6 month follow up mammogram instead.

## 2018-06-04 ENCOUNTER — OFFICE VISIT (OUTPATIENT)
Dept: INTERNAL MEDICINE | Facility: CLINIC | Age: 68
End: 2018-06-04

## 2018-06-04 VITALS
WEIGHT: 135.9 LBS | DIASTOLIC BLOOD PRESSURE: 100 MMHG | BODY MASS INDEX: 22.97 KG/M2 | OXYGEN SATURATION: 99 % | HEART RATE: 57 BPM | SYSTOLIC BLOOD PRESSURE: 130 MMHG

## 2018-06-04 DIAGNOSIS — E78.5 HYPERLIPIDEMIA, UNSPECIFIED HYPERLIPIDEMIA TYPE: ICD-10-CM

## 2018-06-04 DIAGNOSIS — K21.9 GASTROESOPHAGEAL REFLUX DISEASE, ESOPHAGITIS PRESENCE NOT SPECIFIED: ICD-10-CM

## 2018-06-04 DIAGNOSIS — E55.9 VITAMIN D DEFICIENCY: ICD-10-CM

## 2018-06-04 DIAGNOSIS — I10 ESSENTIAL HYPERTENSION: Primary | ICD-10-CM

## 2018-06-04 DIAGNOSIS — F32.A MILD DEPRESSION: ICD-10-CM

## 2018-06-04 LAB — 25(OH)D3+25(OH)D2 SERPL-MCNC: 23.7 NG/ML

## 2018-06-04 PROCEDURE — 99214 OFFICE O/P EST MOD 30 MIN: CPT | Performed by: INTERNAL MEDICINE

## 2018-06-04 RX ORDER — LOSARTAN POTASSIUM 100 MG/1
100 TABLET ORAL DAILY
Qty: 30 TABLET | Refills: 3 | Status: SHIPPED | OUTPATIENT
Start: 2018-06-04 | End: 2018-09-14 | Stop reason: SDUPTHER

## 2018-06-04 NOTE — PROGRESS NOTES
Subjective   Deidre Gomez is a 67 y.o. female.   Chief Complaint   Patient presents with   • Anxiety   • Depression   • Heartburn   • Hyperlipidemia   • Hypertension       Anxiety   Patient reports no chest pain, confusion, decreased concentration, nausea, nervous/anxious behavior, palpitations or shortness of breath.     Her past medical history is significant for depression.   Depression Patient is not experiencing: confusion, decreased concentration, nervousness/anxiety, palpitations and shortness of breath.    Heartburn   She reports no abdominal pain, no chest pain, no coughing, no nausea, no sore throat or no wheezing. Pertinent negatives include no fatigue.   Hyperlipidemia   Pertinent negatives include no chest pain, myalgias or shortness of breath.   Hypertension   Associated symptoms include anxiety. Pertinent negatives include no chest pain, headaches, neck pain, palpitations or shortness of breath.      Hypertension is improving but not at goal. Given her history of severe reactions to hypertensive medications. Will continue to monitor BP . She is feeling better with vitamin d replacement.  Discussed most recent mammogram findings and that a biopsy is recommended. She had prior biopsies in the past and is reluctant to undergo another biopsy.  We had her set up for second opinion and did not go. Refuses to jeff  . She understands biopsy is recommended   The following portions of the patient's history were reviewed and updated as appropriate: allergies, current medications, past family history, past medical history, past social history, past surgical history and problem list.    Review of Systems   Constitutional: Negative for activity change, appetite change, chills, diaphoresis, fatigue, fever and unexpected weight change.   HENT: Negative for congestion, ear discharge, ear pain, mouth sores, nosebleeds, sinus pressure, sneezing and sore throat.    Eyes: Negative for pain, discharge and itching.    Respiratory: Negative for cough, chest tightness, shortness of breath and wheezing.    Cardiovascular: Negative for chest pain, palpitations and leg swelling.   Gastrointestinal: Negative for abdominal pain, constipation, diarrhea, nausea and vomiting.   Endocrine: Negative for cold intolerance, heat intolerance, polydipsia and polyphagia.   Genitourinary: Negative for dysuria, flank pain, frequency, hematuria and urgency.   Musculoskeletal: Negative for arthralgias, back pain, gait problem, myalgias, neck pain and neck stiffness.   Skin: Negative for color change, pallor and rash.   Neurological: Negative for seizures, speech difficulty, numbness and headaches.   Psychiatric/Behavioral: Negative for agitation, confusion, decreased concentration and sleep disturbance. The patient is not nervous/anxious.      /100   Pulse 57   Wt 61.6 kg (135 lb 14.4 oz)   LMP  (LMP Unknown)   SpO2 99%   BMI 22.97 kg/m²     Objective   Physical Exam   Constitutional: She is oriented to person, place, and time. She appears well-developed.   HENT:   Head: Normocephalic.   Right Ear: External ear normal.   Left Ear: External ear normal.   Nose: Nose normal.   Mouth/Throat: Oropharynx is clear and moist.   Eyes: Conjunctivae are normal. Pupils are equal, round, and reactive to light.   Neck: No JVD present. No thyromegaly present.   Cardiovascular: Normal rate, regular rhythm and normal heart sounds.  Exam reveals no friction rub.    No murmur heard.  Pulmonary/Chest: Effort normal and breath sounds normal. No respiratory distress. She has no wheezes. She has no rales.   Abdominal: Soft. Bowel sounds are normal. She exhibits no distension. There is no tenderness. There is no guarding.   Musculoskeletal: She exhibits no edema or tenderness.   Lymphadenopathy:     She has no cervical adenopathy.   Neurological: She is oriented to person, place, and time. She displays normal reflexes. No cranial nerve deficit.   Skin: No rash  noted.   Psychiatric: Her behavior is normal.   Nursing note and vitals reviewed.      Assessment/Plan   Deidre was seen today for hypertension.    Diagnoses and all orders for this visit:    Essential hypertension  Continue current medications. CMP and lipids at next visit  Hyperlipidemia  stable    GERD  stable  Anxiety  stable  Mild depression  Varies in severity   Breast calcification seen on mammogram  Refuses to have biopsy even though highly suspicious.  I set her up for second opinion and she refuses to go. She understands without biopsy, we will not know if it is cancer. If cancer and left untreated, will spread to other organs resulting in death. Still refuses to go for the biopsy.   Us Breast Bilateral Limited  She does not want to have the biopsy at [facility]. Did not show for appt with Dr. Ibarra. She understands biopsy is recommended for her abnormal mammogram. She absolutely refuses to have a biopsy . She understands that this could representt breast cancer but still will no go.   Result Date: 3/12/2018  Calcifications deserving of stereotactic biopsy bilaterally   BI-RADS CATEGORY:  4, SUSPICIOUS   RECOMMENDATION:  2 site stereotactic biopsy (one site each breast)  CAD was utilized.  The standard false-negative rate of mammography is between 10% and 25%. Complex patterns or increased breast density will markedly elevate the false-negative rate of mammography.    A letter, in lay terminology, with the results of this exam was given to the patient at the time of the visit. ________________________________________________________________________ _______ Physicians Order  Stereotactic Breast Biopsy  Diagnosis: Abnormal Mammogram  This report was finalized on 3/12/2018 2:42 PM by Dr. Angely Montejo MD.      Mammo Diagnostic Digital Tomosynthesis Bilateral With Cad    Result Date: 3/12/2018  Calcifications deserving of stereotactic biopsy bilaterally   BI-RADS CATEGORY:  4, SUSPICIOUS   RECOMMENDATION:  2  site stereotactic biopsy (one site each breast)  CAD was utilized.  The standard false-negative rate of mammography is between 10% and 25%. Complex patterns or increased breast density will markedly elevate the false-negative rate of mammography.    A letter, in lay terminology, with the results of this exam was given to the patient at the time of the visit. ________________________________________________________________________ _______ Physicians Order  Stereotactic Breast Biopsy  Diagnosis: Abnormal Mammogram  This report was finalized on 3/12/2018 2:42 PM by Dr. Angely Montejo MD.

## 2018-06-27 ENCOUNTER — OFFICE VISIT (OUTPATIENT)
Dept: INTERNAL MEDICINE | Facility: CLINIC | Age: 68
End: 2018-06-27

## 2018-06-27 VITALS
WEIGHT: 133.6 LBS | OXYGEN SATURATION: 99 % | SYSTOLIC BLOOD PRESSURE: 150 MMHG | BODY MASS INDEX: 22.58 KG/M2 | DIASTOLIC BLOOD PRESSURE: 100 MMHG | HEART RATE: 61 BPM

## 2018-06-27 DIAGNOSIS — F41.9 ANXIETY: ICD-10-CM

## 2018-06-27 DIAGNOSIS — Z00.00 MEDICARE ANNUAL WELLNESS VISIT, SUBSEQUENT: Primary | ICD-10-CM

## 2018-06-27 PROCEDURE — 99397 PER PM REEVAL EST PAT 65+ YR: CPT | Performed by: INTERNAL MEDICINE

## 2018-06-27 PROCEDURE — G0439 PPPS, SUBSEQ VISIT: HCPCS | Performed by: INTERNAL MEDICINE

## 2018-06-27 RX ORDER — ALPRAZOLAM 0.5 MG/1
0.5 TABLET ORAL 2 TIMES DAILY PRN
Qty: 60 TABLET | Refills: 2 | Status: SHIPPED | OUTPATIENT
Start: 2018-06-27 | End: 2018-11-06 | Stop reason: SDUPTHER

## 2018-06-27 RX ORDER — BUPROPION HCL 150 MG
150 TABLET, EXTENDED RELEASE 24 HR ORAL DAILY
Qty: 30 TABLET | Refills: 2 | Status: SHIPPED | OUTPATIENT
Start: 2018-06-27 | End: 2019-11-25

## 2018-06-27 NOTE — PROGRESS NOTES
QUICK REFERENCE INFORMATION:  The ABCs of the Annual Wellness Visit    Subsequent Medicare Wellness Visit    HEALTH RISK ASSESSMENT    1950    Recent Hospitalizations:  No hospitalization(s) within the last year..        Current Medical Providers:  Patient Care Team:  Aspen Salas DO as PCP - General  Aspen Salas DO as PCP - Family Medicine        Smoking Status:  History   Smoking Status   • Current Every Day Smoker   Smokeless Tobacco   • Never Used       Alcohol Consumption:  History   Alcohol Use No       Depression Screen:   PHQ-2/PHQ-9 Depression Screening 6/27/2018   Little interest or pleasure in doing things 3   Feeling down, depressed, or hopeless 3   Trouble falling or staying asleep, or sleeping too much 3   Feeling tired or having little energy 3   Poor appetite or overeating 2   Feeling bad about yourself - or that you are a failure or have let yourself or your family down 3   Trouble concentrating on things, such as reading the newspaper or watching television 3   Moving or speaking so slowly that other people could have noticed. Or the opposite - being so fidgety or restless that you have been moving around a lot more than usual 3   Thoughts that you would be better off dead, or of hurting yourself in some way 2   Total Score 25   If you checked off any problems, how difficult have these problems made it for you to do your work, take care of things at home, or get along with other people? Extremely dIfficult       Health Habits and Functional and Cognitive Screening:  Functional & Cognitive Status 6/27/2018   Do you have difficulty preparing food and eating? Yes   Do you have difficulty bathing yourself, getting dressed or grooming yourself? Yes   Do you have difficulty using the toilet? No   Do you have difficulty moving around from place to place? Yes   Do you have trouble with steps or getting out of a bed or a chair? Yes   In the past year have you fallen or experienced a near  fall? Yes   Current Diet Low Fat Diet   Dental Exam Up to date   Eye Exam Up to date   Exercise (times per week) 4 times per week   Current Exercise Activities Include Walking   Do you need help using the phone?  No   Are you deaf or do you have serious difficulty hearing?  Yes   Do you need help with transportation? No   Do you need help shopping? No   Do you need help preparing meals?  No   Do you need help with housework?  No   Do you need help with laundry? No   Do you need help taking your medications? No   Do you need help managing money? Yes   Do you ever drive or ride in a car without wearing a seat belt? No   Have you felt unusual stress, anger or loneliness in the last month? Yes   Who do you live with? Other   If you need help, do you have trouble finding someone available to you? Yes   Have you been bothered in the last four weeks by sexual problems? No   Do you have difficulty concentrating, remembering or making decisions? Yes           Does the patient have evidence of cognitive impairment? No    Aspirin use counseling: Does not need ASA (and currently is not on it)      Recent Lab Results:  CMP:  Lab Results   Component Value Date    GLU 94 01/22/2018    BUN 29 (H) 01/22/2018    CREATININE 1.10 01/22/2018    EGFRIFNONA 50 (L) 01/22/2018    EGFRIFAFRI 60 (L) 01/22/2018    BCR 26.4 (H) 01/22/2018     01/22/2018    K 4.3 01/22/2018    CO2 25.0 01/22/2018    CALCIUM 10.0 01/22/2018    CALCIUM 10.1 01/22/2018    PROTENTOTREF 6.4 01/22/2018    ALBUMIN 4.70 01/22/2018    LABGLOBREF 1.7 01/22/2018    LABIL2 2.8 (H) 01/22/2018    BILITOT 0.3 01/22/2018    ALKPHOS 70 01/22/2018    AST 19 01/22/2018    ALT 5 (L) 01/22/2018     Lipid Panel:  Lab Results   Component Value Date    TRIG 126 12/18/2015    HDL 83 (H) 12/18/2015     HbA1c:  Lab Results   Component Value Date    HGBA1C 5.2 12/18/2015       Visual Acuity:  No exam data present    Age-appropriate Screening Schedule:  Refer to the list below for  future screening recommendations based on patient's age, sex and/or medical conditions. Orders for these recommended tests are listed in the plan section. The patient has been provided with a written plan.    Health Maintenance   Topic Date Due   • ZOSTER VACCINE (1 of 2) 02/03/2020 (Originally 7/22/2000)   • TDAP/TD VACCINES (1 - Tdap) 02/23/2020 (Originally 7/22/1969)   • PNEUMOCOCCAL VACCINES (65+ LOW/MEDIUM RISK) (2 of 2 - PPSV23) 09/03/2020 (Originally 3/27/2018)   • INFLUENZA VACCINE  08/01/2018   • LIPID PANEL  06/27/2019   • HEMOGLOBIN A1C  06/27/2019   • DXA SCAN  04/24/2020   • MAMMOGRAM  06/27/2020   • COLONOSCOPY  06/28/2027        Subjective   History of Present Illness    Deidre Gomez is a 67 y.o. female who presents for an Subsequent Wellness Visit.    The following portions of the patient's history were reviewed and updated as appropriate: allergies, current medications, past family history, past medical history, past social history, past surgical history and problem list.    Outpatient Medications Prior to Visit   Medication Sig Dispense Refill   • ALPRAZolam (XANAX) 0.5 MG tablet Take 1 tablet by mouth 2 (Two) Times a Day As Needed for Anxiety. 60 tablet 2   • BIOTIN PO Take  by mouth.     • BYSTOLIC 5 MG tablet      • diclofenac (VOLTAREN) 1 % gel gel Apply daily as needed for pain 100 g 1   • ibuprofen (ADVIL,MOTRIN) 800 MG tablet TAKE ONE TABLET BY MOUTH THREE TIMES A DAY 30 tablet 0   • losartan (COZAAR) 100 MG tablet Take 1 tablet by mouth Daily. 30 tablet 3   • methocarbamol (ROBAXIN) 750 MG tablet      • Multiple Vitamins-Minerals (MULTIVITAMIN ADULT PO) Take  by mouth.     • Omega-3 Fatty Acids (FISH OIL) 1000 MG capsule capsule Take  by mouth Daily With Breakfast.     • tiZANidine (ZANAFLEX) 2 MG tablet Take 1 tablet by mouth As Needed.     • traMADol (ULTRAM) 50 MG tablet Take 1 tablet by mouth As Needed.     • zolpidem (AMBIEN) 10 MG tablet TAKE ONE TABLET BY MOUTH AT BEDTIME AS  NEEDED 30 tablet 3   • azithromycin (ZITHROMAX) 250 MG tablet Take 1 tablet by mouth Daily. Take 2 tablets the first day, then 1 tablet daily for 4 days. 6 tablet 0   • brompheniramine-pseudoephedrine-DM 30-2-10 MG/5ML syrup Take 5 mL by mouth 4 (Four) Times a Day As Needed for Congestion, Cough or Allergies. 118 mL 1   • buPROPion SR (WELLBUTRIN SR) 150 MG 12 hr tablet Take 1 tablet by mouth 2 (Two) Times a Day. 60 tablet 2   • vitamin D (ERGOCALCIFEROL) 91046 units capsule capsule 1 po once weekly for 8 weeks 8 capsule 0     No facility-administered medications prior to visit.        Patient Active Problem List   Diagnosis   • Gastroesophageal reflux disease   • Hyperlipidemia   • Essential hypertension   • Chronic insomnia   • Abnormal sensation in both ears   • Acute pharyngitis   • Acute sinusitis   • Acute URI   • Allergic rhinitis   • Anxiety   • Bipolar 1 disorder   • Contact dermatitis   • Mild depression   • Enlarged thyroid   • Fatigue   • Hair loss   • Headache   • Left breast mass   • Leg numbness   • Multinodular thyroid   • Osteoporosis   • Postmenopausal disorder   • Thyroid cyst   • Restless legs syndrome (RLS)   • Hypersomnia   • Periodic limb movement disorder (PLMD)   • Suspected sleep apnea   • Breast pain, left   • Breast calcification seen on mammogram       Advance Care Planning:  has NO advance directive - information provided to the patient today    Identification of Risk Factors:  Risk factors include: weight , cardiovascular risk, inactivity, tobacco use, alcohol use and depression.    Review of Systems   Constitutional: Negative for activity change, appetite change, chills, diaphoresis, fatigue, fever and unexpected weight change.   HENT: Negative for congestion, ear discharge, ear pain, mouth sores, nosebleeds, sinus pressure, sneezing and sore throat.    Eyes: Negative for pain, discharge and itching.   Respiratory: Negative for cough, chest tightness, shortness of breath and wheezing.     Cardiovascular: Negative for chest pain, palpitations and leg swelling.   Gastrointestinal: Negative for abdominal pain, constipation, diarrhea, nausea and vomiting.   Endocrine: Negative for cold intolerance, heat intolerance, polydipsia and polyphagia.   Genitourinary: Negative for dysuria, flank pain, frequency, hematuria and urgency.   Musculoskeletal: Negative for arthralgias, back pain, gait problem, myalgias, neck pain and neck stiffness.   Skin: Negative for color change, pallor and rash.   Neurological: Negative for seizures, speech difficulty, numbness and headaches.   Psychiatric/Behavioral: Negative for agitation, confusion, decreased concentration and sleep disturbance. The patient is not nervous/anxious.        Compared to one year ago, the patient feels her physical health is the same.  Compared to one year ago, the patient feels her mental health is the same.  /100   Pulse 61   Wt 60.6 kg (133 lb 9.6 oz)   LMP  (LMP Unknown)   SpO2 99%   BMI 22.58 kg/m²     Objective     Physical Exam   Constitutional: She is oriented to person, place, and time. She appears well-developed.   HENT:   Head: Normocephalic.   Right Ear: External ear normal.   Left Ear: External ear normal.   Nose: Nose normal.   Mouth/Throat: Oropharynx is clear and moist.   Eyes: Conjunctivae are normal. Pupils are equal, round, and reactive to light.   Neck: No JVD present. No thyromegaly present.   Cardiovascular: Normal rate, regular rhythm and normal heart sounds.  Exam reveals no friction rub.    No murmur heard.  Pulmonary/Chest: Effort normal and breath sounds normal. No respiratory distress. She has no wheezes. She has no rales.   Abdominal: Soft. Bowel sounds are normal. She exhibits no distension. There is no tenderness. There is no guarding.   Musculoskeletal: She exhibits no edema or tenderness.   Lymphadenopathy:     She has no cervical adenopathy.   Neurological: She is oriented to person, place, and time.  She displays normal reflexes. No cranial nerve deficit.   Skin: No rash noted.   Psychiatric: Her behavior is normal.   Nursing note and vitals reviewed.      Vitals:    06/27/18 1356   BP: 150/100   Pulse: 61   SpO2: 99%   Weight: 60.6 kg (133 lb 9.6 oz)       Patient's Body mass index is 22.58 kg/m². BMI is above normal parameters. Recommendations include: educational material.      Assessment/Plan   Patient Self-Management and Personalized Health Advice  The patient has been provided with information about: diet, exercise and weight management and preventive services including:   · Advance directive, Fall Risk assessment done, Fall Risk plan of care done.    Visit Diagnoses:    ICD-10-CM ICD-9-CM   1. Medicare annual wellness visit, subsequent Z00.00 V70.0   She refuses med for depression. No longer will go to a therapist.  She has no plan to hurt herself or anyone else.  Offered again for her to see Dr. Hdez but she will not go.     Orders Placed This Encounter   Procedures   • Comprehensive Metabolic Panel   • Lipid Panel   • TSH   • CBC & Differential     Order Specific Question:   Manual Differential     Answer:   No       Outpatient Encounter Prescriptions as of 6/27/2018   Medication Sig Dispense Refill   • ALPRAZolam (XANAX) 0.5 MG tablet Take 1 tablet by mouth 2 (Two) Times a Day As Needed for Anxiety. 60 tablet 2   • BIOTIN PO Take  by mouth.     • BYSTOLIC 5 MG tablet      • diclofenac (VOLTAREN) 1 % gel gel Apply daily as needed for pain 100 g 1   • ibuprofen (ADVIL,MOTRIN) 800 MG tablet TAKE ONE TABLET BY MOUTH THREE TIMES A DAY 30 tablet 0   • losartan (COZAAR) 100 MG tablet Take 1 tablet by mouth Daily. 30 tablet 3   • methocarbamol (ROBAXIN) 750 MG tablet      • Multiple Vitamins-Minerals (MULTIVITAMIN ADULT PO) Take  by mouth.     • Omega-3 Fatty Acids (FISH OIL) 1000 MG capsule capsule Take  by mouth Daily With Breakfast.     • tiZANidine (ZANAFLEX) 2 MG tablet Take 1 tablet by mouth As Needed.      • traMADol (ULTRAM) 50 MG tablet Take 1 tablet by mouth As Needed.     • zolpidem (AMBIEN) 10 MG tablet TAKE ONE TABLET BY MOUTH AT BEDTIME AS NEEDED 30 tablet 3   • WELLBUTRIN  MG 24 hr tablet Take 1 tablet by mouth Daily. 30 tablet 2   • [DISCONTINUED] azithromycin (ZITHROMAX) 250 MG tablet Take 1 tablet by mouth Daily. Take 2 tablets the first day, then 1 tablet daily for 4 days. 6 tablet 0   • [DISCONTINUED] brompheniramine-pseudoephedrine-DM 30-2-10 MG/5ML syrup Take 5 mL by mouth 4 (Four) Times a Day As Needed for Congestion, Cough or Allergies. 118 mL 1   • [DISCONTINUED] buPROPion SR (WELLBUTRIN SR) 150 MG 12 hr tablet Take 1 tablet by mouth 2 (Two) Times a Day. 60 tablet 2   • [DISCONTINUED] vitamin D (ERGOCALCIFEROL) 84656 units capsule capsule 1 po once weekly for 8 weeks 8 capsule 0     No facility-administered encounter medications on file as of 6/27/2018.        Reviewed use of high risk medication in the elderly: yes  Reviewed for potential of harmful drug interactions in the elderly: yes    Follow Up:  Return in about 1 year (around 6/27/2019) for Medicare Wellness.     An After Visit Summary and PPPS with all of these plans were given to the patient.

## 2018-06-28 LAB
ALBUMIN SERPL-MCNC: 4.44 G/DL (ref 3.2–4.8)
ALBUMIN/GLOB SERPL: 2.3 G/DL (ref 1.5–2.5)
ALP SERPL-CCNC: 72 U/L (ref 25–100)
ALT SERPL-CCNC: 10 U/L (ref 7–40)
AST SERPL-CCNC: 21 U/L (ref 0–33)
BASOPHILS # BLD AUTO: 0.02 10*3/MM3 (ref 0–0.2)
BASOPHILS NFR BLD AUTO: 0.3 % (ref 0–1)
BILIRUB SERPL-MCNC: 0.4 MG/DL (ref 0.3–1.2)
BUN SERPL-MCNC: 19 MG/DL (ref 9–23)
BUN/CREAT SERPL: 15.3 (ref 7–25)
CALCIUM SERPL-MCNC: 9.7 MG/DL (ref 8.7–10.4)
CHLORIDE SERPL-SCNC: 109 MMOL/L (ref 99–109)
CHOLEST SERPL-MCNC: 209 MG/DL (ref 0–200)
CO2 SERPL-SCNC: 27 MMOL/L (ref 20–31)
CREAT SERPL-MCNC: 1.24 MG/DL (ref 0.6–1.3)
EOSINOPHIL # BLD AUTO: 0.09 10*3/MM3 (ref 0–0.3)
EOSINOPHIL NFR BLD AUTO: 1.2 % (ref 0–3)
ERYTHROCYTE [DISTWIDTH] IN BLOOD BY AUTOMATED COUNT: 14.7 % (ref 11.3–14.5)
GLOBULIN SER CALC-MCNC: 2 GM/DL
GLUCOSE SERPL-MCNC: 101 MG/DL (ref 70–100)
HCT VFR BLD AUTO: 43.8 % (ref 34.5–44)
HDLC SERPL-MCNC: 76 MG/DL (ref 40–60)
HGB BLD-MCNC: 14.6 G/DL (ref 11.5–15.5)
IMM GRANULOCYTES # BLD: 0.03 10*3/MM3 (ref 0–0.03)
IMM GRANULOCYTES NFR BLD: 0.4 % (ref 0–0.6)
LDLC SERPL CALC-MCNC: 91 MG/DL (ref 0–100)
LYMPHOCYTES # BLD AUTO: 2.98 10*3/MM3 (ref 0.6–4.8)
LYMPHOCYTES NFR BLD AUTO: 41.2 % (ref 24–44)
MCH RBC QN AUTO: 31.1 PG (ref 27–31)
MCHC RBC AUTO-ENTMCNC: 33.3 G/DL (ref 32–36)
MCV RBC AUTO: 93.2 FL (ref 80–99)
MONOCYTES # BLD AUTO: 0.57 10*3/MM3 (ref 0–1)
MONOCYTES NFR BLD AUTO: 7.9 % (ref 0–12)
NEUTROPHILS # BLD AUTO: 3.55 10*3/MM3 (ref 1.5–8.3)
NEUTROPHILS NFR BLD AUTO: 49 % (ref 41–71)
PLATELET # BLD AUTO: 169 10*3/MM3 (ref 150–450)
POTASSIUM SERPL-SCNC: 5 MMOL/L (ref 3.5–5.5)
PROT SERPL-MCNC: 6.4 G/DL (ref 5.7–8.2)
RBC # BLD AUTO: 4.7 10*6/MM3 (ref 3.89–5.14)
SODIUM SERPL-SCNC: 141 MMOL/L (ref 132–146)
TRIGL SERPL-MCNC: 209 MG/DL (ref 0–150)
TSH SERPL DL<=0.005 MIU/L-ACNC: 2.86 MIU/ML (ref 0.35–5.35)
VLDLC SERPL CALC-MCNC: 41.8 MG/DL
WBC # BLD AUTO: 7.24 10*3/MM3 (ref 3.5–10.8)

## 2018-07-30 ENCOUNTER — TELEPHONE (OUTPATIENT)
Dept: INTERNAL MEDICINE | Facility: CLINIC | Age: 68
End: 2018-07-30

## 2018-07-30 DIAGNOSIS — G89.29 OTHER CHRONIC PAIN: Primary | ICD-10-CM

## 2018-08-07 RX ORDER — NEBIVOLOL HYDROCHLORIDE 5 MG/1
5 TABLET ORAL DAILY
Qty: 30 TABLET | Refills: 2 | Status: SHIPPED | OUTPATIENT
Start: 2018-08-07 | End: 2019-09-07 | Stop reason: SDUPTHER

## 2018-08-07 NOTE — TELEPHONE ENCOUNTER
Please call to [facility] cade.  [facility] and they will not  call this in for her    Pt number [phone]

## 2018-08-10 ENCOUNTER — TELEPHONE (OUTPATIENT)
Dept: INTERNAL MEDICINE | Facility: CLINIC | Age: 68
End: 2018-08-10

## 2018-08-10 NOTE — TELEPHONE ENCOUNTER
Pt wanted to know why there was a pain management referral in for her to see Dr Weiss, she has an appointment with Timothy, please advise

## 2018-08-17 RX ORDER — ZOLPIDEM TARTRATE 10 MG/1
TABLET ORAL
Qty: 30 TABLET | Refills: 3 | Status: CANCELLED | OUTPATIENT
Start: 2018-08-17

## 2018-08-17 RX ORDER — ZOLPIDEM TARTRATE 10 MG/1
10 TABLET ORAL NIGHTLY PRN
Qty: 30 TABLET | Refills: 3 | Status: SHIPPED | OUTPATIENT
Start: 2018-08-17 | End: 2018-08-20 | Stop reason: SDUPTHER

## 2018-08-20 ENCOUNTER — TELEPHONE (OUTPATIENT)
Dept: INTERNAL MEDICINE | Facility: CLINIC | Age: 68
End: 2018-08-20

## 2018-08-20 RX ORDER — ZOLPIDEM TARTRATE 10 MG/1
10 TABLET ORAL NIGHTLY PRN
Qty: 30 TABLET | Refills: 3 | Status: SHIPPED | OUTPATIENT
Start: 2018-08-20 | End: 2018-11-06 | Stop reason: DRUGHIGH

## 2018-08-20 RX ORDER — ZOLPIDEM TARTRATE 10 MG/1
10 TABLET ORAL NIGHTLY PRN
Qty: 30 TABLET | Refills: 3 | Status: SHIPPED | OUTPATIENT
Start: 2018-08-20 | End: 2018-08-20 | Stop reason: SDUPTHER

## 2018-09-14 RX ORDER — LOSARTAN POTASSIUM 100 MG/1
TABLET ORAL
Qty: 30 TABLET | Refills: 2 | Status: SHIPPED | OUTPATIENT
Start: 2018-09-14 | End: 2019-02-09 | Stop reason: SDUPTHER

## 2018-11-06 ENCOUNTER — OFFICE VISIT (OUTPATIENT)
Dept: INTERNAL MEDICINE | Facility: CLINIC | Age: 68
End: 2018-11-06

## 2018-11-06 VITALS
DIASTOLIC BLOOD PRESSURE: 90 MMHG | HEART RATE: 63 BPM | BODY MASS INDEX: 22.16 KG/M2 | WEIGHT: 133 LBS | OXYGEN SATURATION: 99 % | HEIGHT: 65 IN | SYSTOLIC BLOOD PRESSURE: 140 MMHG

## 2018-11-06 DIAGNOSIS — F41.9 ANXIETY: ICD-10-CM

## 2018-11-06 DIAGNOSIS — F32.A MILD DEPRESSION: ICD-10-CM

## 2018-11-06 DIAGNOSIS — I10 ESSENTIAL HYPERTENSION: Primary | ICD-10-CM

## 2018-11-06 DIAGNOSIS — K21.9 GASTROESOPHAGEAL REFLUX DISEASE, ESOPHAGITIS PRESENCE NOT SPECIFIED: ICD-10-CM

## 2018-11-06 DIAGNOSIS — E78.5 HYPERLIPIDEMIA LDL GOAL <100: ICD-10-CM

## 2018-11-06 PROBLEM — G56.03 BILATERAL CARPAL TUNNEL SYNDROME: Status: ACTIVE | Noted: 2018-11-06

## 2018-11-06 PROCEDURE — 99214 OFFICE O/P EST MOD 30 MIN: CPT | Performed by: INTERNAL MEDICINE

## 2018-11-06 RX ORDER — ALPRAZOLAM 0.5 MG/1
0.5 TABLET ORAL 2 TIMES DAILY PRN
Qty: 60 TABLET | Refills: 2 | Status: SHIPPED | OUTPATIENT
Start: 2018-11-06 | End: 2019-09-04 | Stop reason: SDUPTHER

## 2018-11-06 RX ORDER — ZOLPIDEM TARTRATE 5 MG/1
5 TABLET ORAL NIGHTLY PRN
Qty: 30 TABLET | Refills: 1
Start: 2018-11-06 | End: 2019-02-11

## 2018-11-06 NOTE — PROGRESS NOTES
Subjective   Deidre Gomez is a 68 y.o. female.   Chief Complaint   Patient presents with   • Essential hypertension     f/u    • Anxiety     f/u    • Depression     f/u    • Hyperlipidemia     f/u        Anxiety   Patient reports no chest pain, confusion, decreased concentration, nausea, nervous/anxious behavior, palpitations or shortness of breath.     Her past medical history is significant for depression.   Depression Patient is not experiencing: confusion, decreased concentration, nervousness/anxiety, palpitations and shortness of breath.    Heartburn   She reports no abdominal pain, no chest pain, no coughing, no nausea, no sore throat or no wheezing. Pertinent negatives include no fatigue.   Hyperlipidemia   Pertinent negatives include no chest pain, myalgias or shortness of breath.   Hypertension   Associated symptoms include anxiety. Pertinent negatives include no chest pain, headaches, neck pain, palpitations or shortness of breath.      Hypertension is improving but not at goal. Given her history of severe reactions to hypertensive medications. Will continue to monitor BP . She is feeling better with vitamin d replacement.  Discussed most recent mammogram findings and that a biopsy is recommended. She had prior biopsies in the past and is reluctant to undergo another biopsy.  We had her set up for second opinion and did not go. Refuses to jeff   biopsy. She understands biopsy is recommended and delaying it could result in worst prognosis.   The following portions of the patient's history were reviewed and updated as appropriate: allergies, current medications, past family history, past medical history, past social history, past surgical history and problem list.    Review of Systems   Constitutional: Negative for activity change, appetite change, chills, diaphoresis, fatigue, fever and unexpected weight change.   HENT: Negative for congestion, ear discharge, ear pain, mouth sores, nosebleeds, sinus  "pressure, sneezing and sore throat.    Eyes: Negative for pain, discharge and itching.   Respiratory: Negative for cough, chest tightness, shortness of breath and wheezing.    Cardiovascular: Negative for chest pain, palpitations and leg swelling.   Gastrointestinal: Negative for abdominal pain, constipation, diarrhea, nausea and vomiting.   Endocrine: Negative for cold intolerance, heat intolerance, polydipsia and polyphagia.   Genitourinary: Negative for dysuria, flank pain, frequency, hematuria and urgency.   Musculoskeletal: Negative for arthralgias, back pain, gait problem, myalgias, neck pain and neck stiffness.   Skin: Negative for color change, pallor and rash.   Neurological: Negative for seizures, speech difficulty, numbness and headaches.   Psychiatric/Behavioral: Negative for agitation, confusion, decreased concentration and sleep disturbance. The patient is not nervous/anxious.      /90   Pulse 63   Ht 163.8 cm (64.5\")   Wt 60.3 kg (133 lb)   LMP  (LMP Unknown)   SpO2 99%   BMI 22.48 kg/m²     Objective   Physical Exam   Constitutional: She is oriented to person, place, and time. She appears well-developed.   HENT:   Head: Normocephalic.   Right Ear: External ear normal.   Left Ear: External ear normal.   Nose: Nose normal.   Mouth/Throat: Oropharynx is clear and moist.   Eyes: Pupils are equal, round, and reactive to light. Conjunctivae are normal.   Neck: No JVD present. No thyromegaly present.   Cardiovascular: Normal rate, regular rhythm and normal heart sounds.  Exam reveals no friction rub.    No murmur heard.  Pulmonary/Chest: Effort normal and breath sounds normal. No respiratory distress. She has no wheezes. She has no rales.   Abdominal: Soft. Bowel sounds are normal. She exhibits no distension. There is no tenderness. There is no guarding.   Musculoskeletal: She exhibits no edema or tenderness.   Lymphadenopathy:     She has no cervical adenopathy.   Neurological: She is oriented " to person, place, and time. She displays normal reflexes. No cranial nerve deficit.   Skin: No rash noted.   Psychiatric: Her behavior is normal.   Nursing note and vitals reviewed.      Assessment/Plan   Deidre was seen today for hypertension.    Diagnoses and all orders for this visit:    Essential hypertension  Continue current medications. CMP and lipids at next visit  Hyperlipidemia  stable    GERD  stable  Anxiety  Stable. Tried lexapro, zoloft, buspar, atarax and xanax only med that works.   Mild depression  Varies in severity   Breast calcification seen on mammogram  Refuses to have biopsy even though highly suspicious.  I set her up for second opinion and she refuses to go. She understands without biopsy, we will not know if it is cancer. If cancer and left untreated, will spread to other organs resulting in death. Still refuses to go for the biopsy.   Us Breast Bilateral Limited  She does not want to have the biopsy at [facility]. Did not show for appt with Dr. Ibarra. She understands biopsy is recommended for her abnormal mammogram. She absolutely refuses to have a biopsy . She understands that this could representt breast cancer but still will no go.   Result Date: 3/12/2018  Calcifications deserving of stereotactic biopsy bilaterally   BI-RADS CATEGORY:  4, SUSPICIOUS   RECOMMENDATION:  2 site stereotactic biopsy (one site each breast)  CAD was utilized.  The standard false-negative rate of mammography is between 10% and 25%. Complex patterns or increased breast density will markedly elevate the false-negative rate of mammography.    A letter, in lay terminology, with the results of this exam was given to the patient at the time of the visit. ________________________________________________________________________ _______ Physicians Order  Stereotactic Breast Biopsy  Diagnosis: Abnormal Mammogram  This report was finalized on 3/12/2018 2:42 PM by Dr. Angely Montejo MD.      Attention Point  Tomosynthesis Bilateral With Cad    Result Date: 3/12/2018  Calcifications deserving of stereotactic biopsy bilaterally   BI-RADS CATEGORY:  4, SUSPICIOUS   RECOMMENDATION:  2 site stereotactic biopsy (one site each breast)  CAD was utilized.  The standard false-negative rate of mammography is between 10% and 25%. Complex patterns or increased breast density will markedly elevate the false-negative rate of mammography.    A letter, in lay terminology, with the results of this exam was given to the patient at the time of the visit. ________________________________________________________________________ _______ Physicians Order  Stereotactic Breast Biopsy  Diagnosis: Abnormal Mammogram  This report was finalized on 3/12/2018 2:42 PM by Dr. Angely Montejo MD.      The patient has read and signed the [facility] Controlled Substance Contract.  I will continue to see patient for regular follow up appointments.  They are well controlled on their medication.  SCOTT is updated every 3 months. The patient is aware of the potential for addiction and dependence.

## 2018-12-10 ENCOUNTER — TELEPHONE (OUTPATIENT)
Dept: INTERNAL MEDICINE | Facility: CLINIC | Age: 68
End: 2018-12-10

## 2018-12-12 RX ORDER — IBUPROFEN 800 MG/1
TABLET ORAL
Qty: 30 TABLET | Refills: 0 | Status: SHIPPED | OUTPATIENT
Start: 2018-12-12 | End: 2019-07-24

## 2018-12-12 NOTE — TELEPHONE ENCOUNTER
PT WANTS TO SEE IF  WILL CALL HER IN A PRESCRIPTION FOR CHANTIX; SHE WAS SEEN BACK IN November; SHE USES [facility] IN [facility]; IF ANY QUESTIONS, PLEASE CALL [phone]

## 2019-01-09 RX ORDER — ZOLPIDEM TARTRATE 10 MG/1
10 TABLET ORAL NIGHTLY PRN
Qty: 30 TABLET | Refills: 3 | Status: SHIPPED | OUTPATIENT
Start: 2019-01-09 | End: 2019-02-11 | Stop reason: SDUPTHER

## 2019-02-06 NOTE — TELEPHONE ENCOUNTER
PATIENT ALSO STATES THAT SHE HAS RECEIVED NOTIFICATION FROM HER INSURANCE PROVIDER THAT THEY WILL NOT COVER AMBIEN 10 MG TAB. BLACK IS ALLERGIC TO DYES AND THIS IS THE ONLY MEDICATION THAT DOES NOT CAUSE A RASH. SHE ALSO NEEDS A REFILL ON THIS.

## 2019-02-09 RX ORDER — LOSARTAN POTASSIUM 100 MG/1
100 TABLET ORAL DAILY
Qty: 30 TABLET | Refills: 2 | Status: SHIPPED | OUTPATIENT
Start: 2019-02-09 | End: 2019-06-22 | Stop reason: SDUPTHER

## 2019-02-09 NOTE — TELEPHONE ENCOUNTER
PT IS OUT OF THE [place]. PHARMACY IS [facility] [address]. PT CAN BE REACHED [phone]

## 2019-02-11 ENCOUNTER — OFFICE VISIT (OUTPATIENT)
Dept: INTERNAL MEDICINE | Facility: CLINIC | Age: 69
End: 2019-02-11

## 2019-02-11 VITALS
RESPIRATION RATE: 16 BRPM | SYSTOLIC BLOOD PRESSURE: 132 MMHG | WEIGHT: 131 LBS | HEART RATE: 84 BPM | BODY MASS INDEX: 21.83 KG/M2 | OXYGEN SATURATION: 98 % | HEIGHT: 65 IN | DIASTOLIC BLOOD PRESSURE: 72 MMHG

## 2019-02-11 DIAGNOSIS — F51.01 PRIMARY INSOMNIA: Primary | ICD-10-CM

## 2019-02-11 DIAGNOSIS — G89.29 CHRONIC HIP PAIN, LEFT: ICD-10-CM

## 2019-02-11 DIAGNOSIS — F32.9 MAJOR DEPRESSION, CHRONIC: ICD-10-CM

## 2019-02-11 DIAGNOSIS — M25.552 CHRONIC HIP PAIN, LEFT: ICD-10-CM

## 2019-02-11 DIAGNOSIS — F41.9 ANXIETY: ICD-10-CM

## 2019-02-11 DIAGNOSIS — I10 ESSENTIAL HYPERTENSION: ICD-10-CM

## 2019-02-11 DIAGNOSIS — R73.9 HYPERGLYCEMIA: ICD-10-CM

## 2019-02-11 DIAGNOSIS — E78.5 HYPERLIPIDEMIA LDL GOAL <100: ICD-10-CM

## 2019-02-11 DIAGNOSIS — K21.9 GASTROESOPHAGEAL REFLUX DISEASE, ESOPHAGITIS PRESENCE NOT SPECIFIED: ICD-10-CM

## 2019-02-11 PROCEDURE — 99214 OFFICE O/P EST MOD 30 MIN: CPT | Performed by: INTERNAL MEDICINE

## 2019-02-11 RX ORDER — ZOLPIDEM TARTRATE 10 MG/1
10 TABLET ORAL NIGHTLY PRN
Qty: 30 TABLET | Refills: 3 | Status: CANCELLED | OUTPATIENT
Start: 2019-02-11

## 2019-02-11 RX ORDER — ZOLPIDEM TARTRATE 10 MG/1
10 TABLET ORAL NIGHTLY PRN
Qty: 30 TABLET | Refills: 3 | Status: SHIPPED | OUTPATIENT
Start: 2019-02-11 | End: 2019-09-04 | Stop reason: SDUPTHER

## 2019-02-11 NOTE — PROGRESS NOTES
Subjective   Deidre Gomez is a 68 y.o. female.   Chief Complaint   Patient presents with   • Hypertension       Anxiety   Patient reports no chest pain, confusion, decreased concentration, nausea, nervous/anxious behavior, palpitations or shortness of breath.     Her past medical history is significant for depression.   Depression Patient is not experiencing: confusion, decreased concentration, nervousness/anxiety, palpitations and shortness of breath.    Hyperlipidemia   Pertinent negatives include no chest pain, myalgias or shortness of breath.   Heartburn   She reports no abdominal pain, no chest pain, no coughing, no nausea, no sore throat or no wheezing. Associated symptoms include fatigue.   Hypertension   Associated symptoms include anxiety. Pertinent negatives include no chest pain, headaches, neck pain, palpitations or shortness of breath.      Hypertension is improving but not at goal. Given her history of severe reactions to hypertensive medications. Will continue to monitor BP . She is feeling better with vitamin d replacement. Hip pain  That requires pain meds  Discussed most recent mammogram findings and that a biopsy is recommended. She had prior biopsies in the past and is reluctant to undergo another biopsy.  We had her set up for second opinion and did not go. Refuses to jeff   biopsy. She understands biopsy is recommended and delaying it could result in worst prognosis.  If has to go back to nephro, [facility]  Fatigue and depression. Jovan not go see psy.  Has quit part time work. Just wants to  Stay home.  Will not go to sleep doctor.  The following portions of the patient's history were reviewed and updated as appropriate: allergies, current medications, past family history, past medical history, past social history, past surgical history and problem list.    Review of Systems   Constitutional: Positive for fatigue. Negative for activity change, appetite change, chills,  "diaphoresis, fever and unexpected weight change.   HENT: Negative for congestion, ear discharge, ear pain, mouth sores, nosebleeds, sinus pressure, sneezing and sore throat.    Eyes: Negative for pain, discharge and itching.   Respiratory: Negative for cough, chest tightness, shortness of breath and wheezing.    Cardiovascular: Negative for chest pain, palpitations and leg swelling.   Gastrointestinal: Negative for abdominal pain, constipation, diarrhea, nausea and vomiting.   Endocrine: Negative for cold intolerance, heat intolerance, polydipsia and polyphagia.   Genitourinary: Negative for dysuria, flank pain, frequency, hematuria and urgency.   Musculoskeletal: Negative for arthralgias, back pain, gait problem, myalgias, neck pain and neck stiffness.   Skin: Negative for color change, pallor and rash.   Neurological: Negative for seizures, speech difficulty, numbness and headaches.   Psychiatric/Behavioral: Positive for sleep disturbance. Negative for agitation, confusion and decreased concentration. The patient is not nervous/anxious.         Depressed     /72   Pulse 84   Resp 16   Ht 163.8 cm (64.5\")   Wt 59.4 kg (131 lb)   LMP  (LMP Unknown)   SpO2 98%   BMI 22.14 kg/m²     Objective   Physical Exam   Constitutional: She is oriented to person, place, and time. She appears well-developed.   HENT:   Head: Normocephalic.   Right Ear: External ear normal.   Left Ear: External ear normal.   Nose: Nose normal.   Mouth/Throat: Oropharynx is clear and moist.   Eyes: Conjunctivae are normal. Pupils are equal, round, and reactive to light.   Neck: No JVD present. No thyromegaly present.   Cardiovascular: Normal rate, regular rhythm and normal heart sounds. Exam reveals no friction rub.   No murmur heard.  Pulmonary/Chest: Effort normal and breath sounds normal. No respiratory distress. She has no wheezes. She has no rales.   Abdominal: Soft. Bowel sounds are normal. She exhibits no distension. There is no " tenderness. There is no guarding.   Musculoskeletal: She exhibits no edema or tenderness.   Lymphadenopathy:     She has no cervical adenopathy.   Neurological: She is oriented to person, place, and time. She displays normal reflexes. No cranial nerve deficit.   Skin: No rash noted.   Psychiatric: Her behavior is normal.   Nursing note and vitals reviewed.      Assessment/Plan   Deidre was seen today for hypertension.    Diagnoses and all orders for this visit:    Essential hypertension  Continue current medications.   Hyperlipidemia  stable    GERD  stable  Anxiety  Stable. Tried lexapro, zoloft, buspar, atarax and xanax only med that works.   Major depression  Varies in severity. Fatigue with it has made out stop going to part time job. Will not see psy anymore. Will not try anymore meds (sensitive to dyes in most meds). . Does agree to have palliative care consult   Breast calcification seen on mammogram  Refuses to have biopsy even though highly suspicious.  I set her up for second opinion and she refuses to go. She understands without biopsy, we will not know if it is cancer. If cancer and left untreated, will spread to other organs resulting in death. Still refuses to go for the biopsy.   Us Breast Bilateral Limited  She does not want to have the biopsy at [facility]. Did not show for appt with Dr. Ibarra. She understands biopsy is recommended for her abnormal mammogram. She absolutely refuses to have a biopsy . She understands that this could representt breast cancer but still will no go.   Result Date: 3/12/2018  Calcifications deserving of stereotactic biopsy bilaterally   BI-RADS CATEGORY:  4, SUSPICIOUS   RECOMMENDATION:  2 site stereotactic biopsy (one site each breast)  CAD was utilized.  The standard false-negative rate of mammography is between 10% and 25%. Complex patterns or increased breast density will markedly elevate the false-negative rate of mammography.    A letter, in lay terminology, with the  results of this exam was given to the patient at the time of the visit. ________________________________________________________________________ _______ Physicians Order  Stereotactic Breast Biopsy  Diagnosis: Abnormal Mammogram  This report was finalized on 3/12/2018 2:42 PM by Dr. Angely Montejo MD.      Mammo Diagnostic Digital Tomosynthesis Bilateral With Cad    Result Date: 3/12/2018  Calcifications deserving of stereotactic biopsy bilaterally   BI-RADS CATEGORY:  4, SUSPICIOUS   RECOMMENDATION:  2 site stereotactic biopsy (one site each breast)  CAD was utilized.  The standard false-negative rate of mammography is between 10% and 25%. Complex patterns or increased breast density will markedly elevate the false-negative rate of mammography.    A letter, in lay terminology, with the results of this exam was given to the patient at the time of the visit. ________________________________________________________________________ _______ Physicians Order  Stereotactic Breast Biopsy  Diagnosis: Abnormal Mammogram  This report was finalized on 3/12/2018 2:42 PM by Dr. Angely Montejo MD.      The patient has read and signed the [facility] Controlled Substance Contract.  I will continue to see patient for regular follow up appointments.  They are well controlled on their medication.  SCOTT is updated every 3 months. The patient is aware of the potential for addiction and dependence.

## 2019-02-12 LAB
ALBUMIN SERPL-MCNC: 4.57 G/DL (ref 3.2–4.8)
ALBUMIN/GLOB SERPL: 3 G/DL (ref 1.5–2.5)
ALP SERPL-CCNC: 72 U/L (ref 25–100)
ALT SERPL-CCNC: 10 U/L (ref 7–40)
AST SERPL-CCNC: 22 U/L (ref 0–33)
BILIRUB SERPL-MCNC: 0.4 MG/DL (ref 0.3–1.2)
BUN SERPL-MCNC: 19 MG/DL (ref 9–23)
BUN/CREAT SERPL: 17 (ref 7–25)
CALCIUM SERPL-MCNC: 9.7 MG/DL (ref 8.7–10.4)
CHLORIDE SERPL-SCNC: 106 MMOL/L (ref 99–109)
CO2 SERPL-SCNC: 24 MMOL/L (ref 20–31)
CREAT SERPL-MCNC: 1.12 MG/DL (ref 0.6–1.3)
GLOBULIN SER CALC-MCNC: 1.5 GM/DL
GLUCOSE SERPL-MCNC: 94 MG/DL (ref 70–100)
HBA1C MFR BLD: 5.6 % (ref 4.8–5.6)
POTASSIUM SERPL-SCNC: 4.8 MMOL/L (ref 3.5–5.5)
PROT SERPL-MCNC: 6.1 G/DL (ref 5.7–8.2)
SODIUM SERPL-SCNC: 137 MMOL/L (ref 132–146)

## 2019-02-15 ENCOUNTER — DOCUMENTATION (OUTPATIENT)
Dept: PALLIATIVE CARE | Facility: CLINIC | Age: 69
End: 2019-02-15

## 2019-03-05 ENCOUNTER — TELEPHONE (OUTPATIENT)
Dept: INTERNAL MEDICINE | Facility: CLINIC | Age: 69
End: 2019-03-05

## 2019-03-05 NOTE — TELEPHONE ENCOUNTER
PATIENT WANTS US TO REFER HER TO [facility] RENAL [facility] TO SEE DR. MIYA VAZQUEZ. WE REFERRED HER CURRENTLY TO SEE [facility] AND IS NOT HAPPY WITH THEM. SHE WANTS US TO ISSUE A NEW REFERRAL FOR DR. MIYA VAZQUEZ. THE OFFICE NUMBER [phone]

## 2019-03-07 DIAGNOSIS — I10 ESSENTIAL HYPERTENSION: ICD-10-CM

## 2019-03-07 DIAGNOSIS — N18.2 CKD (CHRONIC KIDNEY DISEASE), STAGE II: Primary | ICD-10-CM

## 2019-03-13 DIAGNOSIS — Z51.81 THERAPEUTIC DRUG MONITORING: Primary | ICD-10-CM

## 2019-05-04 ENCOUNTER — OFFICE VISIT (OUTPATIENT)
Dept: INTERNAL MEDICINE | Facility: CLINIC | Age: 69
End: 2019-05-04

## 2019-05-04 VITALS
DIASTOLIC BLOOD PRESSURE: 78 MMHG | SYSTOLIC BLOOD PRESSURE: 128 MMHG | OXYGEN SATURATION: 98 % | TEMPERATURE: 98.5 F | BODY MASS INDEX: 21.66 KG/M2 | HEART RATE: 73 BPM | WEIGHT: 130 LBS | HEIGHT: 65 IN

## 2019-05-04 DIAGNOSIS — F32.A ANXIETY AND DEPRESSION: ICD-10-CM

## 2019-05-04 DIAGNOSIS — F41.9 ANXIETY AND DEPRESSION: ICD-10-CM

## 2019-05-04 DIAGNOSIS — J01.00 ACUTE NON-RECURRENT MAXILLARY SINUSITIS: Primary | ICD-10-CM

## 2019-05-04 PROCEDURE — 99213 OFFICE O/P EST LOW 20 MIN: CPT | Performed by: NURSE PRACTITIONER

## 2019-05-04 RX ORDER — AZITHROMYCIN 250 MG/1
TABLET, FILM COATED ORAL
Qty: 6 TABLET | Refills: 0 | Status: SHIPPED | OUTPATIENT
Start: 2019-05-04 | End: 2019-05-13

## 2019-05-04 NOTE — PROGRESS NOTES
"Chief Complaint   Patient presents with   • Sinusitis     x 3 weeks       History of Present Illness  68 y.o.female presents for sinusitis.  The patient describes about 3 weeks of sinus congestion not improving with home care.  The patient reports associated sinus pressure with nasal purulence. Face hurts in maxillary area.  The patient has post nasal drip with associated scratchy throat.  There is no fever, chills or acute dyspnea.    Pt has been having some increased sx of depression; she stopped taking her wellbutrin \"doesn't want to be on so much medicine.\" but has been crying more. No thoughts of self harm or suicidal ideations. PCP had also referred to psych but pt has not been to see yet.    Review of Systems   Constitutional: Negative for chills and fever.   HENT: Positive for congestion, postnasal drip, rhinorrhea, sinus pressure and sore throat. Negative for ear pain and sneezing.    Respiratory: Positive for cough. Negative for shortness of breath.    Musculoskeletal: Negative for myalgias.   Neurological: Negative for headache.   Psychiatric/Behavioral: Positive for depressed mood. Negative for self-injury and suicidal ideas. The patient is nervous/anxious.        [facility]  The following portions of the patient's history were reviewed and updated as appropriate: allergies, current medications, past family history, past medical history, past social history, past surgical history and problem list.       Past Medical History:   Diagnosis Date   • Arthritis    • Hypertension    • Leg numbness    • Tobacco abuse counseling       Past Surgical History:   Procedure Laterality Date   • APPENDECTOMY     • BREAST BIOPSY     • BREAST CYST ASPIRATION     • HYSTERECTOMY     • OOPHORECTOMY        Allergies   Allergen Reactions   • Codeine    • Olmesartan    • Penicillins    • Cephalexin Rash      Family History   Problem Relation Age of Onset   • Allergies Other    • Depression Other    • Diabetes Other    • Heart " "disease Other    • Hypertension Other    • Skin cancer Other    • Bleeding Disorder Other    • Stroke Other    • Cancer Mother    • Breast cancer Neg Hx         no family hx            Current Outpatient Medications:   •  ALPRAZolam (XANAX) 0.5 MG tablet, Take 1 tablet by mouth 2 (Two) Times a Day As Needed for Anxiety., Disp: 60 tablet, Rfl: 2  •  BIOTIN PO, Take  by mouth., Disp: , Rfl:   •  BYSTOLIC 5 MG tablet, Take 1 tablet by mouth Daily., Disp: 30 tablet, Rfl: 2  •  diclofenac (VOLTAREN) 1 % gel gel, Apply daily as needed for pain, Disp: 100 g, Rfl: 1  •   MG tablet, TAKE ONE TABLET BY MOUTH THREE TIMES A DAY, Disp: 30 tablet, Rfl: 0  •  losartan (COZAAR) 100 MG tablet, Take 1 tablet by mouth Daily., Disp: 30 tablet, Rfl: 2  •  methocarbamol (ROBAXIN) 750 MG tablet, , Disp: , Rfl:   •  Multiple Vitamins-Minerals (MULTIVITAMIN ADULT PO), Take  by mouth., Disp: , Rfl:   •  Omega-3 Fatty Acids (FISH OIL) 1000 MG capsule capsule, Take  by mouth Daily With Breakfast., Disp: , Rfl:   •  tiZANidine (ZANAFLEX) 2 MG tablet, Take 1 tablet by mouth As Needed., Disp: , Rfl:   •  traMADol (ULTRAM) 50 MG tablet, Take 1 tablet by mouth As Needed., Disp: , Rfl:   •  WELLBUTRIN  MG 24 hr tablet, Take 1 tablet by mouth Daily., Disp: 30 tablet, Rfl: 2  •  zolpidem (AMBIEN) 10 MG tablet, Take 1 tablet by mouth At Night As Needed for Sleep., Disp: 30 tablet, Rfl: 3    VITALS:  /78   Pulse 73   Temp 98.5 °F (36.9 °C)   Ht 163.8 cm (64.5\")   Wt 59 kg (130 lb)   LMP  (LMP Unknown)   SpO2 98%   BMI 21.97 kg/m²     Physical Exam   Constitutional: She is oriented to person, place, and time. She appears well-developed and well-nourished. No distress.   HENT:   Head: Normocephalic and atraumatic.   Right Ear: Tympanic membrane, external ear and ear canal normal.   Left Ear: Tympanic membrane, external ear and ear canal normal.   Nose: Mucosal edema, rhinorrhea, sinus tenderness and congestion present. Right " sinus exhibits maxillary sinus tenderness. Right sinus exhibits no frontal sinus tenderness. Left sinus exhibits maxillary sinus tenderness. Left sinus exhibits no frontal sinus tenderness.   Mouth/Throat: Oropharynx is clear and moist. No oropharyngeal exudate.   Purulent nasal secretions   Eyes: Conjunctivae are normal. Pupils are equal, round, and reactive to light. Right conjunctiva is not injected. Left conjunctiva is not injected.   Neck: Neck supple.   Cardiovascular: Normal rate, regular rhythm and normal heart sounds.   Pulmonary/Chest: Effort normal and breath sounds normal. No respiratory distress.   Lymphadenopathy:        Head (right side): No submental, no submandibular and no tonsillar adenopathy present.        Head (left side): No submental, no submandibular and no tonsillar adenopathy present.     She has no cervical adenopathy.   Neurological: She is alert and oriented to person, place, and time.   Skin: Skin is warm and dry.   Psychiatric: She exhibits a depressed mood.   tearful   Nursing note and vitals reviewed.      LABS  No new labs.    ASSESSMENT/PLAN  Deidre was seen today for sinusitis.    Diagnoses and all orders for this visit:    Acute non-recurrent maxillary sinusitis  -     azithromycin (ZITHROMAX) 250 MG tablet; Take 2 tablets the first day, then 1 tablet daily for 4 days.  allergy to pcn.  Can also use OTC nasal steroid spray to help symptoms.  Drink plenty water.    Anxiety and depression  Comments:  encouraged compliance with medications and psych eval as previously ordered. need to FU with PCP next 1-2 weeks.  Any thoughts self harm or suicidal ideations notify our office immediately of seek emergency care.    I discussed the patients findings and my recommendations with patient.  Patient was encouraged to keep me informed of any acute changes, lack of improvement, or any new concerning symptoms.    Patient voiced understanding of all instructions and denied further  questions.      FOLLOW-UP  Return in about 2 weeks (around 5/18/2019), or if symptoms worsen or fail to improve, for Recheck depression.    Electronically signed by:    WELLINGTON Lopez  05/04/2019

## 2019-05-09 RX ORDER — ZOLPIDEM TARTRATE 10 MG/1
10 TABLET ORAL NIGHTLY PRN
Qty: 30 TABLET | Refills: 3 | Status: SHIPPED | OUTPATIENT
Start: 2019-05-09 | End: 2019-07-24 | Stop reason: SDUPTHER

## 2019-05-11 ENCOUNTER — TELEPHONE (OUTPATIENT)
Dept: INTERNAL MEDICINE | Facility: CLINIC | Age: 69
End: 2019-05-11

## 2019-05-11 NOTE — TELEPHONE ENCOUNTER
PT SAW DR GTZ LAST WEEK. SHE'S FINISHED HER ANTIBIOTIC AND STILL ISN'T FEELING ANY BETTER.    CAN SOMETHING ELSE BE CALLED IN FOR HER?

## 2019-05-13 DIAGNOSIS — J01.00 ACUTE NON-RECURRENT MAXILLARY SINUSITIS: Primary | ICD-10-CM

## 2019-05-13 RX ORDER — DOXYCYCLINE HYCLATE 100 MG/1
100 CAPSULE ORAL 2 TIMES DAILY
Qty: 20 CAPSULE | Refills: 0 | Status: SHIPPED | OUTPATIENT
Start: 2019-05-13 | End: 2019-05-23

## 2019-05-13 NOTE — TELEPHONE ENCOUNTER
Please let pt know I sent her an RX for doxycycline for her sinusitis. If no improvement after this she will need to be seen again.  Please advise her to not take her multivitamin for next 10 days while taking the doxy as affects absorption.

## 2019-06-06 ENCOUNTER — OFFICE VISIT (OUTPATIENT)
Dept: PALLIATIVE CARE | Facility: CLINIC | Age: 69
End: 2019-06-06

## 2019-06-06 ENCOUNTER — LAB (OUTPATIENT)
Dept: LAB | Facility: HOSPITAL | Age: 69
End: 2019-06-06

## 2019-06-06 VITALS
BODY MASS INDEX: 22.05 KG/M2 | WEIGHT: 130.5 LBS | HEART RATE: 63 BPM | SYSTOLIC BLOOD PRESSURE: 196 MMHG | DIASTOLIC BLOOD PRESSURE: 88 MMHG

## 2019-06-06 DIAGNOSIS — F31.9 BIPOLAR 1 DISORDER (HCC): Primary | ICD-10-CM

## 2019-06-06 DIAGNOSIS — Z51.81 THERAPEUTIC DRUG MONITORING: ICD-10-CM

## 2019-06-06 DIAGNOSIS — F33.2 SEVERE EPISODE OF RECURRENT MAJOR DEPRESSIVE DISORDER, WITHOUT PSYCHOTIC FEATURES (HCC): ICD-10-CM

## 2019-06-06 LAB
AMPHET+METHAMPHET UR QL: NEGATIVE
AMPHETAMINES UR QL: NEGATIVE
BARBITURATES UR QL SCN: NEGATIVE
BENZODIAZ UR QL SCN: POSITIVE
BUPRENORPHINE SERPL-MCNC: NEGATIVE NG/ML
CANNABINOIDS SERPL QL: POSITIVE
COCAINE UR QL: NEGATIVE
METHADONE UR QL SCN: NEGATIVE
OPIATES UR QL: POSITIVE
OXYCODONE UR QL SCN: NEGATIVE
PCP UR QL SCN: NEGATIVE
PROPOXYPH UR QL: NEGATIVE
TRICYCLICS UR QL SCN: NEGATIVE

## 2019-06-06 PROCEDURE — 99203 OFFICE O/P NEW LOW 30 MIN: CPT | Performed by: INTERNAL MEDICINE

## 2019-06-06 PROCEDURE — 80306 DRUG TEST PRSMV INSTRMNT: CPT

## 2019-06-06 RX ORDER — DOXAZOSIN MESYLATE 4 MG/1
2 TABLET ORAL
COMMUNITY
End: 2019-11-25

## 2019-06-06 NOTE — PROGRESS NOTES
"    Subjective   Deidre Gomez is a 68 y.o. female.     History of Present Illness   Referring/Primary Care:  Aspen Salas ([facility])  Nephrology:  Susie Looney ([facility].  Pain Management:  Bibi Ventura ([place])    68yof referred to palliative care for major depression in setting of breast calcification seen on mammograms, and decline to have biopsy.    Seen together with palliative LCSW, after separate LCSW visit.  Lifelong depression since childhood and Bipolar depression dx age 20s.  Not taking any medication, non-compliance due to medication burden with other medications started for co-morbidities.  She was last under the care of [facility] for counseling and psychiatric medication management    Function: Lives alone independently taking care of pets - parrots and dog.    Support/Strengths:  Sees Dr. Ventura, pain management.  Felt back pain with sciatica limited her.  Feels pain medication \"gives me a boost.\"  Noted on Lortab 5/325mg BID.    Distress/Difficulties: Major depression.  Many times does not get out of bed.  Has rescheduled here at least twice, once did not feel able to get out of bed 2/2 depression and another time was confused on wrong date.  See LCSW note.    Substance Use History: Remote use of cannabis (recreational).  Currently using CBD oil for pain.      ACP/Goals: \"I want to feel like everyone else.\"  Re: cancer, she would not want to go through biopsy and treatments.  Has friend and sister with breast cancer.  Friend has .  She feels the treatment plan is horrible and painful.  Endorses that she is not suicidal and her decision to not pursue biopsy is not based on lack of knowledge.  Expressed h/o many biopsies for cysts, so she prefers surveillance.    The following portions of the patient's history were reviewed and updated as appropriate: allergies, current medications, past family history, past medical history, past social history, past " surgical history and problem list.    Review of Systems  Otherwise negative except as below and as already detailed in HPI.    SCOTT:  Reviewed.  See scanned form in Media. No concerns.  Consistent with history.  Prescribers identified as members of care team.     Medication Counts:  Reviewed.  See RN note. Did not bring medication to appointment    CONTROLLED SUBSTANCE TRACKING 6/6/2019   Last Scott 6/6/2019   Report Number [id number]   Last UDS 6/6/2019   ORT Initial Risk Score 8   Disposal Education and Agreement [id number]       UDS:  THC, Screen, Urine   Date Value Ref Range Status   06/06/2019 Positive (A) Negative Final     Phencyclidine (PCP), Urine   Date Value Ref Range Status   06/06/2019 Negative Negative Final     Cocaine Screen, Urine   Date Value Ref Range Status   06/06/2019 Negative Negative Final     Methamphetamine, Ur   Date Value Ref Range Status   06/06/2019 Negative Negative Final     Opiate Screen   Date Value Ref Range Status   06/06/2019 Positive (A) Negative Final     Amphetamine Screen, Urine   Date Value Ref Range Status   06/06/2019 Negative Negative Final     Benzodiazepine Screen, Urine   Date Value Ref Range Status   06/06/2019 Positive (A) Negative Final     Tricyclic Antidepressants Screen   Date Value Ref Range Status   06/06/2019 Negative Negative Final     Methadone Screen, Urine   Date Value Ref Range Status   06/06/2019 Negative Negative Final     Barbiturates Screen, Urine   Date Value Ref Range Status   06/06/2019 Negative Negative Final     Oxycodone Screen, Urine   Date Value Ref Range Status   06/06/2019 Negative Negative Final     Propoxyphene Screen   Date Value Ref Range Status   06/06/2019 Negative Negative Final     Buprenorphine, Screen, Urine   Date Value Ref Range Status   06/06/2019 Negative Negative Final     Palliative Performance Scale  Palliative Performance Scale Score: 70%    [place] Symptom Assessment System Revised  Pain Score: no pain   ESAS Tiredness Score:  8  ESAS Nausea Score: No nausea  ESAS Depression Score: Worst possible depression  ESAS Anxiety Score: 8  ESAS Drowsiness Score: 1  ESAS Lack of Appetite Score: 1  ESAS Wellbeing Score: 7  ESAS Dyspnea Score: 1  ESAS Source of Information: patient    AC-7:    Over the last two weeks, how often have you been bothered by the following problems?  Feeling nervous, anxious or on edge: Nearly every day  Not being able to stop or control worrying: Nearly every day  Worrying too much about different things: Nearly every day  Trouble Relaxing: Nearly every day  Being so restless that it is hard to sit still: Nearly every day  Becoming easily annoyed or irritable: Nearly every day  Feeling afraid as if something awful might happen: Nearly every day  AC 7 Total Score: 21    PHQ-9:  PHQ-2/PHQ-9 Depression Screening 6/6/2019   Little interest or pleasure in doing things 3   Feeling down, depressed, or hopeless 3   Trouble falling or staying asleep, or sleeping too much 3   Feeling tired or having little energy 3   Poor appetite or overeating 3   Feeling bad about yourself - or that you are a failure or have let yourself or your family down 3   Trouble concentrating on things, such as reading the newspaper or watching television 3   Moving or speaking so slowly that other people could have noticed. Or the opposite - being so fidgety or restless that you have been moving around a lot more than usual 3   Thoughts that you would be better off dead, or of hurting yourself in some way 3   Total Score 27   If you checked off any problems, how difficult have these problems made it for you to do your work, take care of things at home, or get along with other people? Extremely dIfficult        ECOG: (2) Ambulatory and capable of self care, unable to carry out work activity, up and about > 50% or waking hours    Objective   Physical Exam   Constitutional: She is oriented to person, place, and time. No distress.   Well-kempt.  Hair fixed,  wearing makeup and jewelry   Pulmonary/Chest: Effort normal. No respiratory distress.   Musculoskeletal:   Thin.  Noted mild thoracic kyphosis sitting in chair with legs crossed, hunched shoulders of poor posture   Neurological: She is alert and oriented to person, place, and time. She exhibits normal muscle tone. Coordination normal.   Skin: Skin is warm and dry. She is not diaphoretic.   Psychiatric: Her speech is normal and behavior is normal. Judgment and thought content normal. Cognition and memory are normal. She exhibits a depressed mood. She expresses no suicidal ideation. She expresses no suicidal plans.   Nursing note and vitals reviewed.        Assessment/Plan   Deidre was seen today for appointment, anxiety and depression.    Diagnoses and all orders for this visit:    Bipolar 1 disorder (CMS/HCC)    Severe episode of recurrent major depressive disorder, without psychotic features (CMS/HCC)                 Discussion:  Reviewed impact of her untreated major depression on her quality of life.  Motivated her to seek out behavioral health care.  She has seen [facility] in past and does not recall negative experience under their care.    Reviewed role of palliative care team, with holistic multidisciplinary perspective, and assistance with patients living with serious illness.    Reviewed reality of unknown malignancy status.  However, she feels her personal experiences with biopsies and her second hand experience with breast cancer ( friend and recently sister with workup for cancer), this the reasoning behind her decision to not pursue biopsy for breast calcification.  She states to me, if diagnosed with malignancy, she would prefer no treatment, but with LCSW, stated she would consider options at that time that malignancy was clearly evident.  Reassured her that palliative team could help support her regardless of her decisions.        Total face to face time spent:  25 min.   Greater than 50% time spent in counseling re:  plan of care as detailed above.      Care coordination:   -  We sat with patient to have her call for an appointment with [facility].  June 19th is date.  -  LCSW spent most of today's visit with her  -  Offered palliative care f/u in 3 months to ensure coordinated care and for ongoing assessment and conversations re: potential breast malignancy.

## 2019-06-06 NOTE — PROGRESS NOTES
Pt referred from her primary doctor. She was unsure why she was here.   Explained that palliative team usually sees pts with severe or life limiting illnesses.   Discussed doctor's notes about refusing a biopsy. She explains that she has gone through aspirating cysts many times. 3 years ago the provider ran the needle straight through her breast and it is still painful, bruised. She does not want to go through that again.  Validated her concern about the procedure. She had made decision after some research into what was seen on the mammogram. She says she had agreed to yearly mammogram and this will be next month.   She would reconsider biopsy at that time if there are changes. She said if it was lung cancer she would jump on having a diagnosis.   Explored her supports. She feels she does not have any despite being involved in many clubs and activities. 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 Isolation as part of depression, she shares questioning her purpose. Cares to her dog and 2 parrots and this is what gets her up in the morning.   Discussed her mood. She says the last time she saw her doctor she was in very bad shape, her mood was very down and she was not doing normal day to day tasks. She shares depression history since age 10 with thinking about death( not suicidal thoughts) Diagnosed with culture shock at 12 and bipolar as a young adult. Shares her early history of coming from [place].   She had gotten tired of taking all the medication she takes and had discontinued her antidepressant. She feels she is doing better than when she saw her doctor but continues to have depression.  Shared with DR. Ledesma and we discussed with the pt that the best fit for her is mental health intervention.   Discussed treatment plan as she says she  does not know what to do.   She is willing to resume seeing therapist and resuming her antidepressants.We assured that if she did have a cancer diagnosis that  this team could follow for support and assistance.  Sat with her while she made appt for [facility] she was seen before. This is June 19th.     Follow up appt offered. Discussed better fit for her is mental health treatment but follow up offered so she does not fall through the cracks. She shares she feels all she does is go to doctors and take meds. Told her she can cancel this follow up if this appt is more of a burden.

## 2019-06-06 NOTE — PROGRESS NOTES
New pt here to establish care. Pt reports that she has severe depression and anxiety.   She does see pain management and takes HCD  And muscle relaxers for pain.    Pt was taking Ibuprofen but was told to stop it due to CKD.  She used CPD oil

## 2019-06-18 ENCOUNTER — TELEPHONE (OUTPATIENT)
Dept: INTERNAL MEDICINE | Facility: CLINIC | Age: 69
End: 2019-06-18

## 2019-06-18 NOTE — TELEPHONE ENCOUNTER
PT IS CALLING BECAUSE OF HER WELLNESS  APPT BEING CANCELLED BY US. SHE IS WANTING TO RESCHEDULE FOR SOMETIME IN July IN THE AFTERNOON BUT I CANNOT PLACE HER ANYWHERE. IF YOU COULD SEE IF THERES ANYWHERE WHERE YOU MIGHT BE ABLE TO PLACE HER IN FOR HER WELLNESS, PLEASE NOTIFY HER. THANK YOU!!    PT#: [phone]

## 2019-06-23 RX ORDER — LOSARTAN POTASSIUM 100 MG/1
TABLET ORAL
Qty: 30 TABLET | Refills: 2 | Status: SHIPPED | OUTPATIENT
Start: 2019-06-23 | End: 2019-07-24 | Stop reason: ALTCHOICE

## 2019-07-24 ENCOUNTER — OFFICE VISIT (OUTPATIENT)
Dept: INTERNAL MEDICINE | Facility: CLINIC | Age: 69
End: 2019-07-24

## 2019-07-24 VITALS
HEART RATE: 62 BPM | BODY MASS INDEX: 21.46 KG/M2 | OXYGEN SATURATION: 99 % | SYSTOLIC BLOOD PRESSURE: 130 MMHG | WEIGHT: 127 LBS | DIASTOLIC BLOOD PRESSURE: 100 MMHG

## 2019-07-24 DIAGNOSIS — E04.2 MULTINODULAR THYROID: ICD-10-CM

## 2019-07-24 DIAGNOSIS — F31.9 BIPOLAR 1 DISORDER (HCC): ICD-10-CM

## 2019-07-24 DIAGNOSIS — F33.2 SEVERE EPISODE OF RECURRENT MAJOR DEPRESSIVE DISORDER, WITHOUT PSYCHOTIC FEATURES (HCC): ICD-10-CM

## 2019-07-24 DIAGNOSIS — F51.04 CHRONIC INSOMNIA: ICD-10-CM

## 2019-07-24 DIAGNOSIS — F41.9 ANXIETY: ICD-10-CM

## 2019-07-24 DIAGNOSIS — K21.9 GASTROESOPHAGEAL REFLUX DISEASE, ESOPHAGITIS PRESENCE NOT SPECIFIED: ICD-10-CM

## 2019-07-24 DIAGNOSIS — R92.1 BREAST CALCIFICATION SEEN ON MAMMOGRAM: ICD-10-CM

## 2019-07-24 DIAGNOSIS — J30.1 SEASONAL ALLERGIC RHINITIS DUE TO POLLEN: ICD-10-CM

## 2019-07-24 DIAGNOSIS — G25.81 RESTLESS LEGS SYNDROME (RLS): ICD-10-CM

## 2019-07-24 DIAGNOSIS — F32.4 MAJOR DEPRESSIVE DISORDER IN PARTIAL REMISSION, UNSPECIFIED WHETHER RECURRENT (HCC): ICD-10-CM

## 2019-07-24 DIAGNOSIS — G56.03 BILATERAL CARPAL TUNNEL SYNDROME: ICD-10-CM

## 2019-07-24 DIAGNOSIS — Z00.00 MEDICARE ANNUAL WELLNESS VISIT, SUBSEQUENT: Primary | ICD-10-CM

## 2019-07-24 DIAGNOSIS — I10 ESSENTIAL HYPERTENSION: ICD-10-CM

## 2019-07-24 DIAGNOSIS — E55.9 VITAMIN D DEFICIENCY: ICD-10-CM

## 2019-07-24 DIAGNOSIS — E78.5 HYPERLIPIDEMIA LDL GOAL <100: ICD-10-CM

## 2019-07-24 DIAGNOSIS — M81.8 OTHER OSTEOPOROSIS WITHOUT CURRENT PATHOLOGICAL FRACTURE: ICD-10-CM

## 2019-07-24 PROBLEM — N64.4 BREAST PAIN, LEFT: Status: RESOLVED | Noted: 2017-11-29 | Resolved: 2019-07-24

## 2019-07-24 PROBLEM — G47.10 HYPERSOMNIA: Status: RESOLVED | Noted: 2017-03-28 | Resolved: 2019-07-24

## 2019-07-24 PROBLEM — G47.61 PERIODIC LIMB MOVEMENT DISORDER (PLMD): Status: RESOLVED | Noted: 2017-03-28 | Resolved: 2019-07-24

## 2019-07-24 PROCEDURE — G0439 PPPS, SUBSEQ VISIT: HCPCS | Performed by: INTERNAL MEDICINE

## 2019-07-24 PROCEDURE — 96160 PT-FOCUSED HLTH RISK ASSMT: CPT | Performed by: INTERNAL MEDICINE

## 2019-07-24 RX ORDER — DESVENLAFAXINE 25 MG/1
TABLET, EXTENDED RELEASE ORAL DAILY
COMMUNITY
Start: 2019-07-17 | End: 2019-11-25

## 2019-07-24 RX ORDER — HYDROCODONE BITARTRATE AND ACETAMINOPHEN 5; 325 MG/1; MG/1
TABLET ORAL 2 TIMES DAILY
COMMUNITY
Start: 2019-07-17 | End: 2019-11-25

## 2019-07-24 NOTE — PROGRESS NOTES
The ABCs of the Annual Wellness Visit  Subsequent Medicare Wellness Visit    Chief Complaint   Patient presents with   • Medicare Wellness-subsequent       Subjective   History of Present Illness:  Deidre Gomez is a 69 y.o. female who presents for a Subsequent Medicare Wellness Visit.    HEALTH RISK ASSESSMENT    Recent Hospitalizations:  No hospitalization(s) within the last year.    Current Medical Providers:  Patient Care Team:  Aspen Salas DO as PCP - General  Aspen Salas DO as PCP - Family Medicine    Smoking Status:  Social History     Tobacco Use   Smoking Status Former Smoker   Smokeless Tobacco Never Used       Alcohol Consumption:  Social History     Substance and Sexual Activity   Alcohol Use No       Depression Screen:   PHQ-2/PHQ-9 Depression Screening 6/6/2019   Little interest or pleasure in doing things 3   Feeling down, depressed, or hopeless 3   Trouble falling or staying asleep, or sleeping too much 3   Feeling tired or having little energy 3   Poor appetite or overeating 3   Feeling bad about yourself - or that you are a failure or have let yourself or your family down 3   Trouble concentrating on things, such as reading the newspaper or watching television 3   Moving or speaking so slowly that other people could have noticed. Or the opposite - being so fidgety or restless that you have been moving around a lot more than usual 3   Thoughts that you would be better off dead, or of hurting yourself in some way 3   Total Score 27   If you checked off any problems, how difficult have these problems made it for you to do your work, take care of things at home, or get along with other people? Extremely dIfficult       Fall Risk Screen:  STEADI Fall Risk Assessment has not been completed.    Health Habits and Functional and Cognitive Screening:  Functional & Cognitive Status 7/24/2019   Do you have difficulty preparing food and eating? No   Do you have difficulty bathing yourself,  getting dressed or grooming yourself? No   Do you have difficulty using the toilet? No   Do you have difficulty moving around from place to place? No   Do you have trouble with steps or getting out of a bed or a chair? No   Current Diet Well Balanced Diet   Dental Exam Up to date   Eye Exam Up to date   Exercise (times per week) 0 times per week   Current Exercise Activities Include No Regular Exercise   Do you need help using the phone?  No   Are you deaf or do you have serious difficulty hearing?  Yes   Do you need help with transportation? No   Do you need help shopping? No   Do you need help preparing meals?  No   Do you need help with housework?  No   Do you need help with laundry? No   Do you need help taking your medications? No   Do you need help managing money? Yes   Do you ever drive or ride in a car without wearing a seat belt? No   Have you felt unusual stress, anger or loneliness in the last month? No   Who do you live with? Alone   If you need help, do you have trouble finding someone available to you? Yes   Have you been bothered in the last four weeks by sexual problems? No   Do you have difficulty concentrating, remembering or making decisions? Yes         Does the patient have evidence of cognitive impairment? No    Asprin use counseling:Does not need ASA (and currently is not on it)    Age-appropriate Screening Schedule:  Refer to the list below for future screening recommendations based on patient's age, sex and/or medical conditions. Orders for these recommended tests are listed in the plan section. The patient has been provided with a written plan.    Health Maintenance   Topic Date Due   • LIPID PANEL  06/27/2019   • ZOSTER VACCINE (1 of 2) 02/03/2020 (Originally 7/22/2000)   • TDAP/TD VACCINES (1 - Tdap) 02/23/2020 (Originally 7/22/1969)   • PNEUMOCOCCAL VACCINES (65+ LOW/MEDIUM RISK) (2 of 2 - PPSV23) 09/03/2020 (Originally 3/27/2018)   • INFLUENZA VACCINE  08/01/2019   • HEMOGLOBIN A1C   02/11/2020   • DXA SCAN  04/24/2020   • MAMMOGRAM  06/27/2020   • COLONOSCOPY  06/28/2027   • DIABETIC FOOT EXAM  Discontinued   • DIABETIC EYE EXAM  Discontinued   • URINE MICROALBUMIN  Discontinued          The following portions of the patient's history were reviewed and updated as appropriate: allergies, current medications, past family history, past medical history, past social history, past surgical history and problem list.    Outpatient Medications Prior to Visit   Medication Sig Dispense Refill   • ALPRAZolam (XANAX) 0.5 MG tablet Take 1 tablet by mouth 2 (Two) Times a Day As Needed for Anxiety. 60 tablet 2   • BYSTOLIC 5 MG tablet Take 1 tablet by mouth Daily. (Patient taking differently: Take 10 mg by mouth Daily.) 30 tablet 2   • diclofenac (VOLTAREN) 1 % gel gel Apply daily as needed for pain 100 g 1   • doxazosin (CARDURA) 4 MG tablet Take 2 mg by mouth every night at bedtime.     • zolpidem (AMBIEN) 10 MG tablet Take 1 tablet by mouth At Night As Needed for Sleep. 30 tablet 3   • Desvenlafaxine Succinate ER 25 MG tablet sustained-release 24 hour Daily.     • HYDROcodone-acetaminophen (NORCO) 5-325 MG per tablet 2 (Two) Times a Day.     • WELLBUTRIN  MG 24 hr tablet Take 1 tablet by mouth Daily. 30 tablet 2   • BIOTIN PO Take  by mouth.     •  MG tablet TAKE ONE TABLET BY MOUTH THREE TIMES A DAY 30 tablet 0   • losartan (COZAAR) 100 MG tablet TAKE 1 TABLET BY MOUTH ONCE DAILY 30 tablet 2   • methocarbamol (ROBAXIN) 750 MG tablet      • Multiple Vitamins-Minerals (MULTIVITAMIN ADULT PO) Take  by mouth.     • Omega-3 Fatty Acids (FISH OIL) 1000 MG capsule capsule Take  by mouth Daily With Breakfast.     • tiZANidine (ZANAFLEX) 2 MG tablet Take 1 tablet by mouth As Needed.     • traMADol (ULTRAM) 50 MG tablet Take 1 tablet by mouth As Needed.     • zolpidem (AMBIEN) 10 MG tablet TAKE 1 TABLET BY MOUTH AT NIGHT AS NEEDED FOR SLEEP. 30 tablet 3     No facility-administered medications prior to  visit.        Patient Active Problem List   Diagnosis   • Gastroesophageal reflux disease   • Hyperlipidemia LDL goal <100   • Essential hypertension   • Chronic insomnia   • Allergic rhinitis   • Anxiety   • Bipolar 1 disorder (CMS/HCC)   • Multinodular thyroid   • Osteoporosis   • Restless legs syndrome (RLS)   • Suspected sleep apnea   • Breast calcification seen on mammogram   • Bilateral carpal tunnel syndrome   • Severe episode of recurrent major depressive disorder, without psychotic features (CMS/HCC)       Advanced Care Planning:  Patient does not have an advance directive - not interested in additional information    Review of Systems   Constitutional: Negative for activity change, appetite change, chills, diaphoresis, fatigue, fever and unexpected weight change.   HENT: Negative for congestion, ear discharge, ear pain, mouth sores, nosebleeds, sinus pressure, sneezing and sore throat.    Eyes: Negative for pain, discharge and itching.   Respiratory: Negative for cough, chest tightness, shortness of breath and wheezing.    Cardiovascular: Negative for chest pain, palpitations and leg swelling.   Gastrointestinal: Negative for abdominal pain, constipation, diarrhea, nausea and vomiting.   Endocrine: Negative for cold intolerance, heat intolerance, polydipsia and polyphagia.   Genitourinary: Negative for dysuria, flank pain, frequency, hematuria and urgency.   Musculoskeletal: Positive for arthralgias and back pain. Negative for gait problem, myalgias, neck pain and neck stiffness.   Skin: Negative for color change, pallor and rash.   Neurological: Negative for seizures, speech difficulty, numbness and headaches.   Psychiatric/Behavioral: Negative for agitation, confusion, decreased concentration and sleep disturbance. The patient is not nervous/anxious.        Compared to one year ago, the patient feels her physical health is better.  Compared to one year ago, the patient feels her mental health is  better.    Reviewed chart for potential of high risk medication in the elderly: yes  Reviewed chart for potential of harmful drug interactions in the elderly:yes    Objective         Vitals:    07/24/19 1236   BP: 130/100   Pulse: 62   SpO2: 99%   Weight: 57.6 kg (127 lb)   PainSc: 0-No pain       Body mass index is 21.46 kg/m².  Discussed the patient's BMI with her. The BMI is above average; BMI management plan is completed.    Physical Exam   Constitutional: She is oriented to person, place, and time. She appears well-developed.   HENT:   Head: Normocephalic.   Right Ear: External ear normal.   Left Ear: External ear normal.   Nose: Nose normal.   Mouth/Throat: Oropharynx is clear and moist.   Eyes: Conjunctivae are normal. Pupils are equal, round, and reactive to light.   Neck: No JVD present. No thyromegaly present.   Cardiovascular: Normal rate, regular rhythm and normal heart sounds. Exam reveals no friction rub.   No murmur heard.  Pulmonary/Chest: Effort normal and breath sounds normal. No respiratory distress. She has no wheezes. She has no rales.   Abdominal: Soft. Bowel sounds are normal. She exhibits no distension. There is no tenderness. There is no guarding.   Musculoskeletal: She exhibits no edema or tenderness.   Lymphadenopathy:     She has no cervical adenopathy.   Neurological: She is oriented to person, place, and time. She displays normal reflexes. No cranial nerve deficit.   Skin: No rash noted.   Psychiatric: Her behavior is normal.   Nursing note and vitals reviewed.            Assessment/Plan   Medicare Risks and Personalized Health Plan  CMS Preventative Services Quick Reference  Obesity/Overweight     The above risks/problems have been discussed with the patient.  Pertinent information has been shared with the patient in the After Visit Summary.  Follow up plans and orders are seen below in the Assessment/Plan Section.    Diagnoses and all orders for this visit:    1. Medicare annual  wellness visit, subsequent (Primary)  done  2. Essential hypertension  improving  3. Hyperlipidemia LDL goal <100  stable  4. Seasonal allergic rhinitis due to pollen  stable  5. Gastroesophageal reflux disease, esophagitis presence not specified  stable  6. Multinodular thyroid  stable  7. Bilateral carpal tunnel syndrome  stable  8. Other osteoporosis without current pathological fracture  stable  9. Bipolar 1 disorder (CMS/HCC)  Much better now that is back at [facility]  10. Anxiety  stable  11. Breast calcification seen on mammogram  Still does not want biopsy.  She will notify me if changes mind and can set her up for it again.   12. Chronic insomnia  stable  13. Major depressive disorder in partial remission, unspecified whether recurrent (CMS/HCC)  Improving. Is getting out of  Bed. Taking care of home now. Wanting and looking forward to sub in middle school  14. Restless legs syndrome (RLS)  gaber  15. Severe episode of recurrent major depressive disorder, without psychotic features (CMS/HCC)    See number 13  Follow Up:  No Follow-up on file.     An After Visit Summary and PPPS were given to the patient.

## 2019-07-24 NOTE — PROGRESS NOTES
The ABCs of the Annual Wellness Visit  Subsequent Medicare Wellness Visit    Chief Complaint   Patient presents with   • Medicare Wellness-subsequent       Subjective   History of Present Illness:  Deidre Gomez is a 69 y.o. female who presents for a Subsequent Medicare Wellness Visit.    HEALTH RISK ASSESSMENT    Recent Hospitalizations:  No hospitalization(s) within the last year.    Current Medical Providers:  Patient Care Team:  Aspen Salas DO as PCP - General  Aspen Salas DO as PCP - Family Medicine    Smoking Status:  Social History     Tobacco Use   Smoking Status Former Smoker   Smokeless Tobacco Never Used       Alcohol Consumption:  Social History     Substance and Sexual Activity   Alcohol Use No       Depression Screen:   PHQ-2/PHQ-9 Depression Screening 6/6/2019   Little interest or pleasure in doing things 3   Feeling down, depressed, or hopeless 3   Trouble falling or staying asleep, or sleeping too much 3   Feeling tired or having little energy 3   Poor appetite or overeating 3   Feeling bad about yourself - or that you are a failure or have let yourself or your family down 3   Trouble concentrating on things, such as reading the newspaper or watching television 3   Moving or speaking so slowly that other people could have noticed. Or the opposite - being so fidgety or restless that you have been moving around a lot more than usual 3   Thoughts that you would be better off dead, or of hurting yourself in some way 3   Total Score 27   If you checked off any problems, how difficult have these problems made it for you to do your work, take care of things at home, or get along with other people? Extremely dIfficult       Fall Risk Screen:  STEADI Fall Risk Assessment has not been completed.    Health Habits and Functional and Cognitive Screening:  Functional & Cognitive Status 7/24/2019   Do you have difficulty preparing food and eating? No   Do you have difficulty bathing yourself,  getting dressed or grooming yourself? No   Do you have difficulty using the toilet? No   Do you have difficulty moving around from place to place? No   Do you have trouble with steps or getting out of a bed or a chair? No   Current Diet Well Balanced Diet   Dental Exam Up to date   Eye Exam Up to date   Exercise (times per week) 0 times per week   Current Exercise Activities Include No Regular Exercise   Do you need help using the phone?  No   Are you deaf or do you have serious difficulty hearing?  Yes   Do you need help with transportation? No   Do you need help shopping? No   Do you need help preparing meals?  No   Do you need help with housework?  No   Do you need help with laundry? No   Do you need help taking your medications? No   Do you need help managing money? Yes   Do you ever drive or ride in a car without wearing a seat belt? No   Have you felt unusual stress, anger or loneliness in the last month? No   Who do you live with? Alone   If you need help, do you have trouble finding someone available to you? Yes   Have you been bothered in the last four weeks by sexual problems? No   Do you have difficulty concentrating, remembering or making decisions? Yes         Does the patient have evidence of cognitive impairment? No    Asprin use counseling:Does not need ASA (and currently is not on it)    Age-appropriate Screening Schedule:  Refer to the list below for future screening recommendations based on patient's age, sex and/or medical conditions. Orders for these recommended tests are listed in the plan section. The patient has been provided with a written plan.    Health Maintenance   Topic Date Due   • LIPID PANEL  06/27/2019   • ZOSTER VACCINE (1 of 2) 02/03/2020 (Originally 7/22/2000)   • TDAP/TD VACCINES (1 - Tdap) 02/23/2020 (Originally 7/22/1969)   • PNEUMOCOCCAL VACCINES (65+ LOW/MEDIUM RISK) (2 of 2 - PPSV23) 09/03/2020 (Originally 3/27/2018)   • INFLUENZA VACCINE  08/01/2019   • HEMOGLOBIN A1C   02/11/2020   • DXA SCAN  04/24/2020   • MAMMOGRAM  06/27/2020   • COLONOSCOPY  06/28/2027   • DIABETIC FOOT EXAM  Discontinued   • DIABETIC EYE EXAM  Discontinued   • URINE MICROALBUMIN  Discontinued          The following portions of the patient's history were reviewed and updated as appropriate: allergies, current medications, past family history, past medical history, past social history, past surgical history and problem list.    Outpatient Medications Prior to Visit   Medication Sig Dispense Refill   • ALPRAZolam (XANAX) 0.5 MG tablet Take 1 tablet by mouth 2 (Two) Times a Day As Needed for Anxiety. 60 tablet 2   • BYSTOLIC 5 MG tablet Take 1 tablet by mouth Daily. (Patient taking differently: Take 10 mg by mouth Daily.) 30 tablet 2   • diclofenac (VOLTAREN) 1 % gel gel Apply daily as needed for pain 100 g 1   • doxazosin (CARDURA) 4 MG tablet Take 2 mg by mouth every night at bedtime.     • zolpidem (AMBIEN) 10 MG tablet Take 1 tablet by mouth At Night As Needed for Sleep. 30 tablet 3   • Desvenlafaxine Succinate ER 25 MG tablet sustained-release 24 hour Daily.     • HYDROcodone-acetaminophen (NORCO) 5-325 MG per tablet 2 (Two) Times a Day.     • WELLBUTRIN  MG 24 hr tablet Take 1 tablet by mouth Daily. 30 tablet 2   • BIOTIN PO Take  by mouth.     •  MG tablet TAKE ONE TABLET BY MOUTH THREE TIMES A DAY 30 tablet 0   • losartan (COZAAR) 100 MG tablet TAKE 1 TABLET BY MOUTH ONCE DAILY 30 tablet 2   • methocarbamol (ROBAXIN) 750 MG tablet      • Multiple Vitamins-Minerals (MULTIVITAMIN ADULT PO) Take  by mouth.     • Omega-3 Fatty Acids (FISH OIL) 1000 MG capsule capsule Take  by mouth Daily With Breakfast.     • tiZANidine (ZANAFLEX) 2 MG tablet Take 1 tablet by mouth As Needed.     • traMADol (ULTRAM) 50 MG tablet Take 1 tablet by mouth As Needed.     • zolpidem (AMBIEN) 10 MG tablet TAKE 1 TABLET BY MOUTH AT NIGHT AS NEEDED FOR SLEEP. 30 tablet 3     No facility-administered medications prior to  visit.        Patient Active Problem List   Diagnosis   • Gastroesophageal reflux disease   • Hyperlipidemia LDL goal <100   • Essential hypertension   • Chronic insomnia   • Abnormal sensation in both ears   • Acute pharyngitis   • Acute sinusitis   • Acute URI   • Allergic rhinitis   • Anxiety   • Bipolar 1 disorder (CMS/HCC)   • Contact dermatitis   • Mild depression (CMS/HCC)   • Enlarged thyroid   • Fatigue   • Hair loss   • Headache   • Left breast mass   • Leg numbness   • Multinodular thyroid   • Osteoporosis   • Postmenopausal disorder   • Thyroid cyst   • Restless legs syndrome (RLS)   • Hypersomnia   • Periodic limb movement disorder (PLMD)   • Suspected sleep apnea   • Breast pain, left   • Breast calcification seen on mammogram   • Bilateral carpal tunnel syndrome   • Severe episode of recurrent major depressive disorder, without psychotic features (CMS/[facility])       Advanced Care Planning:  Patient does not have an advance directive - not interested in additional information    Review of Systems   Constitutional: Negative for activity change, appetite change, chills, diaphoresis, fatigue, fever and unexpected weight change.   HENT: Negative for congestion, ear discharge, ear pain, mouth sores, nosebleeds, sinus pressure, sneezing and sore throat.    Eyes: Negative for pain, discharge and itching.   Respiratory: Negative for cough, chest tightness, shortness of breath and wheezing.    Cardiovascular: Negative for chest pain, palpitations and leg swelling.   Gastrointestinal: Negative for abdominal pain, constipation, diarrhea, nausea and vomiting.   Endocrine: Negative for cold intolerance, heat intolerance, polydipsia and polyphagia.   Genitourinary: Negative for dysuria, flank pain, frequency, hematuria and urgency.   Musculoskeletal: Positive for arthralgias and back pain. Negative for gait problem, myalgias, neck pain and neck stiffness.   Skin: Negative for color change, pallor and rash.    Neurological: Negative for seizures, speech difficulty, numbness and headaches.   Psychiatric/Behavioral: Negative for agitation, confusion, decreased concentration and sleep disturbance. The patient is not nervous/anxious.        Compared to one year ago, the patient feels her physical health is better.  Compared to one year ago, the patient feels her mental health is better.    Reviewed chart for potential of high risk medication in the elderly: yes  Reviewed chart for potential of harmful drug interactions in the elderly:yes    Objective         Vitals:    07/24/19 1236   BP: 130/100   Pulse: 62   SpO2: 99%   Weight: 57.6 kg (127 lb)   PainSc: 0-No pain       Body mass index is 21.46 kg/m².  Discussed the patient's BMI with her. The BMI is above average; BMI management plan is completed.    Physical Exam   Constitutional: She is oriented to person, place, and time. She appears well-developed.   HENT:   Head: Normocephalic.   Right Ear: External ear normal.   Left Ear: External ear normal.   Nose: Nose normal.   Mouth/Throat: Oropharynx is clear and moist.   Eyes: Conjunctivae are normal. Pupils are equal, round, and reactive to light.   Neck: No JVD present. No thyromegaly present.   Cardiovascular: Normal rate, regular rhythm and normal heart sounds. Exam reveals no friction rub.   No murmur heard.  Pulmonary/Chest: Effort normal and breath sounds normal. No respiratory distress. She has no wheezes. She has no rales.   Abdominal: Soft. Bowel sounds are normal. She exhibits no distension. There is no tenderness. There is no guarding.   Musculoskeletal: She exhibits no edema or tenderness.   Lymphadenopathy:     She has no cervical adenopathy.   Neurological: She is oriented to person, place, and time. She displays normal reflexes. No cranial nerve deficit.   Skin: No rash noted.   Psychiatric: Her behavior is normal.   Nursing note and vitals reviewed.            Assessment/Plan   Medicare Risks and  Personalized Health Plan  CMS Preventative Services Quick Reference  Obesity/Overweight     The above risks/problems have been discussed with the patient.  Pertinent information has been shared with the patient in the After Visit Summary.  Follow up plans and orders are seen below in the Assessment/Plan Section.    There are no diagnoses linked to this encounter.  Follow Up:  No Follow-up on file.     An After Visit Summary and PPPS were given to the patient.

## 2019-07-24 NOTE — PROGRESS NOTES
The ABCs of the Annual Wellness Visit  Subsequent Medicare Wellness Visit    Chief Complaint   Patient presents with   • Medicare Wellness-subsequent       Subjective   History of Present Illness:  Deidre Gomez is a 69 y.o. female who presents for a Subsequent Medicare Wellness Visit.    HEALTH RISK ASSESSMENT    Recent Hospitalizations:  No hospitalization(s) within the last year.    Current Medical Providers:  Patient Care Team:  Aspen Salas DO as PCP - General  Aspen Salas DO as PCP - Family Medicine    Smoking Status:  Social History     Tobacco Use   Smoking Status Former Smoker   Smokeless Tobacco Never Used       Alcohol Consumption:  Social History     Substance and Sexual Activity   Alcohol Use No       Depression Screen:   PHQ-2/PHQ-9 Depression Screening 6/6/2019   Little interest or pleasure in doing things 3   Feeling down, depressed, or hopeless 3   Trouble falling or staying asleep, or sleeping too much 3   Feeling tired or having little energy 3   Poor appetite or overeating 3   Feeling bad about yourself - or that you are a failure or have let yourself or your family down 3   Trouble concentrating on things, such as reading the newspaper or watching television 3   Moving or speaking so slowly that other people could have noticed. Or the opposite - being so fidgety or restless that you have been moving around a lot more than usual 3   Thoughts that you would be better off dead, or of hurting yourself in some way 3   Total Score 27   If you checked off any problems, how difficult have these problems made it for you to do your work, take care of things at home, or get along with other people? Extremely dIfficult       Fall Risk Screen:  STEADI Fall Risk Assessment has not been completed.    Health Habits and Functional and Cognitive Screening:  Functional & Cognitive Status 6/27/2018   Do you have difficulty preparing food and eating? Yes   Do you have difficulty bathing yourself,  getting dressed or grooming yourself? Yes   Do you have difficulty using the toilet? No   Do you have difficulty moving around from place to place? Yes   Do you have trouble with steps or getting out of a bed or a chair? Yes   Current Diet Low Fat Diet   Dental Exam Up to date   Eye Exam Up to date   Exercise (times per week) 4 times per week   Current Exercise Activities Include Walking   Do you need help using the phone?  No   Are you deaf or do you have serious difficulty hearing?  Yes   Do you need help with transportation? No   Do you need help shopping? No   Do you need help preparing meals?  No   Do you need help with housework?  No   Do you need help with laundry? No   Do you need help taking your medications? No   Do you need help managing money? Yes   Do you ever drive or ride in a car without wearing a seat belt? No   Have you felt unusual stress, anger or loneliness in the last month? Yes   Who do you live with? Other   If you need help, do you have trouble finding someone available to you? Yes   Have you been bothered in the last four weeks by sexual problems? No   Do you have difficulty concentrating, remembering or making decisions? Yes         Does the patient have evidence of cognitive impairment? No    Asprin use counseling:Does not need ASA (and currently is not on it)    Age-appropriate Screening Schedule:  Refer to the list below for future screening recommendations based on patient's age, sex and/or medical conditions. Orders for these recommended tests are listed in the plan section. The patient has been provided with a written plan.    Health Maintenance   Topic Date Due   • LIPID PANEL  06/27/2019   • ZOSTER VACCINE (1 of 2) 02/03/2020 (Originally 7/22/2000)   • TDAP/TD VACCINES (1 - Tdap) 02/23/2020 (Originally [birth year]/1969)   • PNEUMOCOCCAL VACCINES (65+ LOW/MEDIUM RISK) (2 of 2 - PPSV23) 09/03/2020 (Originally 3/27/2018)   • INFLUENZA VACCINE  08/01/2019   • HEMOGLOBIN A1C  02/11/2020   •  DXA SCAN  04/24/2020   • MAMMOGRAM  06/27/2020   • COLONOSCOPY  06/28/2027   • DIABETIC FOOT EXAM  Discontinued   • DIABETIC EYE EXAM  Discontinued   • URINE MICROALBUMIN  Discontinued          The following portions of the patient's history were reviewed and updated as appropriate: allergies, current medications, past family history, past medical history, past social history, past surgical history and problem list.    Outpatient Medications Prior to Visit   Medication Sig Dispense Refill   • ALPRAZolam (XANAX) 0.5 MG tablet Take 1 tablet by mouth 2 (Two) Times a Day As Needed for Anxiety. 60 tablet 2   • BYSTOLIC 5 MG tablet Take 1 tablet by mouth Daily. (Patient taking differently: Take 10 mg by mouth Daily.) 30 tablet 2   • diclofenac (VOLTAREN) 1 % gel gel Apply daily as needed for pain 100 g 1   • doxazosin (CARDURA) 4 MG tablet Take 2 mg by mouth every night at bedtime.     • zolpidem (AMBIEN) 10 MG tablet Take 1 tablet by mouth At Night As Needed for Sleep. 30 tablet 3   • Desvenlafaxine Succinate ER 25 MG tablet sustained-release 24 hour Daily.     • HYDROcodone-acetaminophen (NORCO) 5-325 MG per tablet 2 (Two) Times a Day.     • WELLBUTRIN  MG 24 hr tablet Take 1 tablet by mouth Daily. 30 tablet 2   • BIOTIN PO Take  by mouth.     •  MG tablet TAKE ONE TABLET BY MOUTH THREE TIMES A DAY 30 tablet 0   • losartan (COZAAR) 100 MG tablet TAKE 1 TABLET BY MOUTH ONCE DAILY 30 tablet 2   • methocarbamol (ROBAXIN) 750 MG tablet      • Multiple Vitamins-Minerals (MULTIVITAMIN ADULT PO) Take  by mouth.     • Omega-3 Fatty Acids (FISH OIL) 1000 MG capsule capsule Take  by mouth Daily With Breakfast.     • tiZANidine (ZANAFLEX) 2 MG tablet Take 1 tablet by mouth As Needed.     • traMADol (ULTRAM) 50 MG tablet Take 1 tablet by mouth As Needed.     • zolpidem (AMBIEN) 10 MG tablet TAKE 1 TABLET BY MOUTH AT NIGHT AS NEEDED FOR SLEEP. 30 tablet 3     No facility-administered medications prior to visit.   "      Patient Active Problem List   Diagnosis   • Gastroesophageal reflux disease   • Hyperlipidemia LDL goal <100   • Essential hypertension   • Chronic insomnia   • Abnormal sensation in both ears   • Acute pharyngitis   • Acute sinusitis   • Acute URI   • Allergic rhinitis   • Anxiety   • Bipolar 1 disorder (CMS/HCC)   • Contact dermatitis   • Mild depression (CMS/[facility])   • Enlarged thyroid   • Fatigue   • Hair loss   • Headache   • Left breast mass   • Leg numbness   • Multinodular thyroid   • Osteoporosis   • Postmenopausal disorder   • Thyroid cyst   • Restless legs syndrome (RLS)   • Hypersomnia   • Periodic limb movement disorder (PLMD)   • Suspected sleep apnea   • Breast pain, left   • Breast calcification seen on mammogram   • Bilateral carpal tunnel syndrome   • Severe episode of recurrent major depressive disorder, without psychotic features (CMS/[facility])       Advanced Care Planning:  {Advanced Directive Status:47230}    Review of Systems    Compared to one year ago, the patient feels her physical health is {better worse same:29671}.  Compared to one year ago, the patient feels her mental health is {better worse same:64234}.    Reviewed chart for potential of high risk medication in the elderly: {Response;Yes/No/NA:[id number]::\"yes\"}  Reviewed chart for potential of harmful drug interactions in the elderly:{Response;Yes/No/NA:[id number]::\"yes\"}    Objective         Vitals:    07/24/19 1236   BP: 130/100   Pulse: 62   SpO2: 99%   Weight: 57.6 kg (127 lb)   PainSc: 0-No pain       Body mass index is 21.46 kg/m².  Discussed the patient's BMI with her. The BMI {BMI plan ([facility] measure 421):29505}.    Physical Exam          Assessment/Plan   Medicare Risks and Personalized Health Plan  CMS Preventative Services Quick Reference  {Medicare Wellness Risk Factors and Personalized Health Plan:53470}    The above risks/problems have been discussed with the patient.  Pertinent information has been shared with the " patient in the After Visit Summary.  Follow up plans and orders are seen below in the Assessment/Plan Section.    There are no diagnoses linked to this encounter.  Follow Up:  No Follow-up on file.     An After Visit Summary and PPPS were given to the patient.

## 2019-07-25 LAB
25(OH)D3+25(OH)D2 SERPL-MCNC: 37.7 NG/ML (ref 30–100)
ALBUMIN SERPL-MCNC: 4.8 G/DL (ref 3.5–5.2)
ALBUMIN/GLOB SERPL: 2.5 G/DL
ALP SERPL-CCNC: 70 U/L (ref 39–117)
ALT SERPL-CCNC: 6 U/L (ref 1–33)
AST SERPL-CCNC: 17 U/L (ref 1–32)
BILIRUB SERPL-MCNC: 0.4 MG/DL (ref 0.2–1.2)
BUN SERPL-MCNC: 19 MG/DL (ref 8–23)
BUN/CREAT SERPL: 19.4 (ref 7–25)
CALCIUM SERPL-MCNC: 9.5 MG/DL (ref 8.6–10.5)
CHLORIDE SERPL-SCNC: 102 MMOL/L (ref 98–107)
CHOLEST SERPL-MCNC: 187 MG/DL (ref 0–200)
CO2 SERPL-SCNC: 24.1 MMOL/L (ref 22–29)
CREAT SERPL-MCNC: 0.98 MG/DL (ref 0.57–1)
GLOBULIN SER CALC-MCNC: 1.9 GM/DL
GLUCOSE SERPL-MCNC: 101 MG/DL (ref 65–99)
HDLC SERPL-MCNC: 77 MG/DL (ref 40–60)
LDLC SERPL CALC-MCNC: 83 MG/DL (ref 0–100)
POTASSIUM SERPL-SCNC: 4.7 MMOL/L (ref 3.5–5.2)
PROT SERPL-MCNC: 6.7 G/DL (ref 6–8.5)
SODIUM SERPL-SCNC: 140 MMOL/L (ref 136–145)
TRIGL SERPL-MCNC: 134 MG/DL (ref 0–150)
VLDLC SERPL CALC-MCNC: 26.8 MG/DL

## 2019-08-30 RX ORDER — ZOLPIDEM TARTRATE 10 MG/1
10 TABLET ORAL NIGHTLY PRN
Qty: 30 TABLET | Refills: 3 | OUTPATIENT
Start: 2019-08-30

## 2019-09-04 ENCOUNTER — TELEPHONE (OUTPATIENT)
Dept: INTERNAL MEDICINE | Facility: CLINIC | Age: 69
End: 2019-09-04

## 2019-09-04 ENCOUNTER — OFFICE VISIT (OUTPATIENT)
Dept: INTERNAL MEDICINE | Facility: CLINIC | Age: 69
End: 2019-09-04

## 2019-09-04 VITALS
SYSTOLIC BLOOD PRESSURE: 150 MMHG | OXYGEN SATURATION: 99 % | WEIGHT: 121.4 LBS | BODY MASS INDEX: 20.52 KG/M2 | HEART RATE: 65 BPM | DIASTOLIC BLOOD PRESSURE: 80 MMHG

## 2019-09-04 DIAGNOSIS — E78.5 HYPERLIPIDEMIA LDL GOAL <100: ICD-10-CM

## 2019-09-04 DIAGNOSIS — F41.9 ANXIETY: ICD-10-CM

## 2019-09-04 DIAGNOSIS — F51.01 PRIMARY INSOMNIA: ICD-10-CM

## 2019-09-04 DIAGNOSIS — F33.2 SEVERE EPISODE OF RECURRENT MAJOR DEPRESSIVE DISORDER, WITHOUT PSYCHOTIC FEATURES (HCC): ICD-10-CM

## 2019-09-04 DIAGNOSIS — F51.04 CHRONIC INSOMNIA: ICD-10-CM

## 2019-09-04 DIAGNOSIS — N18.30 CHRONIC KIDNEY DISEASE, STAGE 3 (HCC): ICD-10-CM

## 2019-09-04 DIAGNOSIS — I10 ESSENTIAL HYPERTENSION: Primary | ICD-10-CM

## 2019-09-04 PROCEDURE — 99214 OFFICE O/P EST MOD 30 MIN: CPT | Performed by: INTERNAL MEDICINE

## 2019-09-04 RX ORDER — ALPRAZOLAM 0.5 MG/1
0.5 TABLET ORAL 2 TIMES DAILY PRN
Qty: 60 TABLET | Refills: 2 | Status: SHIPPED | OUTPATIENT
Start: 2019-09-04 | End: 2020-02-24 | Stop reason: SDUPTHER

## 2019-09-04 RX ORDER — TIZANIDINE 2 MG/1
2 TABLET ORAL NIGHTLY PRN
COMMUNITY
End: 2019-11-25

## 2019-09-04 RX ORDER — ZOLPIDEM TARTRATE 10 MG/1
10 TABLET ORAL NIGHTLY PRN
Qty: 30 TABLET | Refills: 3 | Status: SHIPPED | OUTPATIENT
Start: 2019-09-04 | End: 2020-01-02

## 2019-09-04 RX ORDER — HYDROCHLOROTHIAZIDE 25 MG/1
TABLET ORAL DAILY
COMMUNITY
Start: 2019-08-05 | End: 2020-07-27

## 2019-09-04 NOTE — TELEPHONE ENCOUNTER
[facility] WOULD LIKE TO BRING TO OUR ATTENTION THAT PATIENT IS ALSO TAKING NORCO AND JUST WANT TO LET US KNOW THAT THEY ARE FILLING IT FOR HER

## 2019-09-04 NOTE — PROGRESS NOTES
Subjective   Deidre Gomez is a 69 y.o. female.   Chief Complaint   Patient presents with   • Anxiety     Follow Up   • Heartburn   • Hyperlipidemia   • Hypertension       Hypertension   Associated symptoms include anxiety. Pertinent negatives include no chest pain, headaches, neck pain, palpitations or shortness of breath.   Anxiety   Patient reports no chest pain, confusion, decreased concentration, nausea, nervous/anxious behavior, palpitations or shortness of breath.     Her past medical history is significant for depression.   Depression Patient is not experiencing: confusion, decreased concentration, nervousness/anxiety, palpitations and shortness of breath.    Hyperlipidemia   Pertinent negatives include no chest pain, myalgias or shortness of breath.   Heartburn   She reports no abdominal pain, no chest pain, no coughing, no nausea, no sore throat or no wheezing. Pertinent negatives include no fatigue.      Hypertension is improving but not at goal. Given her history of severe reactions to hypertensive medications. Will continue to monitor BP . She is feeling better with vitamin d replacement. Hip pain  That requires pain meds  Discussed most recent mammogram findings and that a biopsy is recommended. She had prior biopsies in the past and is reluctant to undergo another biopsy.  We had her set up for second opinion and did not go. Refuses to jeff   biopsy. She understands biopsy is recommended and delaying it could result in worst prognosis.  If has to go back to nephro, wants to [email or web address]  Depression. Med helping but having side effects nausea. Has f/u with psy to discuss. .  The following portions of the patient's history were reviewed and updated as appropriate: allergies, current medications, past family history, past medical history, past social history, past surgical history and problem list.    Review of Systems   Constitutional: Negative for activity change, appetite change, chills,  diaphoresis, fatigue, fever and unexpected weight change.   HENT: Negative for congestion, ear discharge, ear pain, mouth sores, nosebleeds, sinus pressure, sneezing and sore throat.    Eyes: Negative for pain, discharge and itching.   Respiratory: Negative for cough, chest tightness, shortness of breath and wheezing.    Cardiovascular: Negative for chest pain, palpitations and leg swelling.   Gastrointestinal: Negative for abdominal pain, constipation, diarrhea, nausea and vomiting.   Endocrine: Negative for cold intolerance, heat intolerance, polydipsia and polyphagia.   Genitourinary: Negative for dysuria, flank pain, frequency, hematuria and urgency.   Musculoskeletal: Negative for arthralgias, back pain, gait problem, myalgias, neck pain and neck stiffness.   Skin: Negative for color change, pallor and rash.   Neurological: Negative for seizures, speech difficulty, numbness and headaches.   Psychiatric/Behavioral: Negative for agitation, confusion, decreased concentration and sleep disturbance. The patient is not nervous/anxious.         Depressed     /80   Pulse 65   Wt 55.1 kg (121 lb 6.4 oz)   LMP  (LMP Unknown)   SpO2 99%   BMI 20.52 kg/m²     Objective   Physical Exam   Constitutional: She is oriented to person, place, and time. She appears well-developed.   HENT:   Head: Normocephalic.   Right Ear: External ear normal.   Left Ear: External ear normal.   Nose: Nose normal.   Mouth/Throat: Oropharynx is clear and moist.   Eyes: Conjunctivae are normal. Pupils are equal, round, and reactive to light.   Neck: No JVD present. No thyromegaly present.   Cardiovascular: Normal rate, regular rhythm and normal heart sounds. Exam reveals no friction rub.   No murmur heard.  Pulmonary/Chest: Effort normal and breath sounds normal. No respiratory distress. She has no wheezes. She has no rales.   Abdominal: Soft. Bowel sounds are normal. She exhibits no distension. There is no tenderness. There is no  guarding.   Musculoskeletal: She exhibits no edema or tenderness.   Lymphadenopathy:     She has no cervical adenopathy.   Neurological: She is oriented to person, place, and time. She displays normal reflexes. No cranial nerve deficit.   Skin: No rash noted.   Psychiatric: Her behavior is normal.   Nursing note and vitals reviewed.      Assessment/Plan   Deidre was seen today for hypertension.    Diagnoses and all orders for this visit:    Essential hypertension  Continue current medications.  Nephrology dc doxazosin to hctz secondary to side effects.  Hyperlipidemia  stable    GERD  stable  Anxiety  Stable. Tried lexapro, zoloft, buspar, atarax and xanax only med that works.   Major depression  wellbutrin is helping and pristiq (some side effects but will discuss with psy tomorrow)    CKD Stage 3  Stable.     The patient has read and signed the [facility] Controlled Substance Contract.  I will continue to see patient for regular follow up appointments.  They are well controlled on their medication.  SCOTT is updated every 3 months. The patient is aware of the potential for addiction and dependence.

## 2019-09-07 RX ORDER — NEBIVOLOL HYDROCHLORIDE 5 MG/1
5 TABLET ORAL DAILY
Qty: 30 TABLET | Refills: 2 | Status: SHIPPED | OUTPATIENT
Start: 2019-09-07 | End: 2019-12-04 | Stop reason: SDUPTHER

## 2019-09-07 NOTE — TELEPHONE ENCOUNTER
PATIENT IS NEEDING A REFILL ON HER BYSTOLIC 5 MG, SHE SHOULD HAVE HAD 60 DAYS LEFT ON HER BYSTOLIC, BUT PATIENT ONLY GETS A MONTH AT A TIME, SO IT . PATIENTS PHARMACY IS [facility]. PT CAN BE REACHED [phone]

## 2019-11-25 ENCOUNTER — OFFICE VISIT (OUTPATIENT)
Dept: INTERNAL MEDICINE | Facility: CLINIC | Age: 69
End: 2019-11-25

## 2019-11-25 VITALS
WEIGHT: 130.4 LBS | SYSTOLIC BLOOD PRESSURE: 148 MMHG | DIASTOLIC BLOOD PRESSURE: 82 MMHG | HEART RATE: 78 BPM | BODY MASS INDEX: 22.04 KG/M2 | OXYGEN SATURATION: 99 %

## 2019-11-25 DIAGNOSIS — F41.9 ANXIETY: ICD-10-CM

## 2019-11-25 DIAGNOSIS — N18.30 CHRONIC KIDNEY DISEASE, STAGE 3 (HCC): ICD-10-CM

## 2019-11-25 DIAGNOSIS — E78.5 HYPERLIPIDEMIA LDL GOAL <100: ICD-10-CM

## 2019-11-25 DIAGNOSIS — K21.9 GASTROESOPHAGEAL REFLUX DISEASE, ESOPHAGITIS PRESENCE NOT SPECIFIED: ICD-10-CM

## 2019-11-25 DIAGNOSIS — I10 ESSENTIAL HYPERTENSION: Primary | ICD-10-CM

## 2019-11-25 PROCEDURE — 99213 OFFICE O/P EST LOW 20 MIN: CPT | Performed by: INTERNAL MEDICINE

## 2019-11-25 NOTE — PROGRESS NOTES
Subjective   Deidre Gomez is a 69 y.o. female.   Chief Complaint   Patient presents with   • Follow-up   • Hypertension       Anxiety   Patient reports no chest pain, confusion, decreased concentration, nausea, nervous/anxious behavior, palpitations or shortness of breath.     Her past medical history is significant for depression.   Heartburn   She reports no abdominal pain, no chest pain, no coughing, no nausea, no sore throat or no wheezing. Pertinent negatives include no fatigue.   Hyperlipidemia   Pertinent negatives include no chest pain, myalgias or shortness of breath.   Hypertension   Associated symptoms include anxiety. Pertinent negatives include no chest pain, headaches, neck pain, palpitations or shortness of breath.   Depression Patient is not experiencing: confusion, decreased concentration, nervousness/anxiety, palpitations and shortness of breath.       Hypertension is improving but not at goal. Given her history of severe reactions to hypertensive medications. Will continue to monitor BP . She is feeling better with vitamin d replacement. Hip pain  That requires pain meds  Discussed most recent mammogram findings and that a biopsy is recommended. She had prior biopsies in the past and is reluctant to undergo another biopsy.  We had her set up for second opinion and did not go. Refuses to jeff   biopsy. She understands biopsy is recommended and delaying it could result in worst prognosis.  .  The following portions of the patient's history were reviewed and updated as appropriate: allergies, current medications, past family history, past medical history, past social history, past surgical history and problem list.    Review of Systems   Constitutional: Negative for activity change, appetite change, chills, diaphoresis, fatigue, fever and unexpected weight change.   HENT: Negative for congestion, ear discharge, ear pain, mouth sores, nosebleeds, sinus pressure, sneezing and sore throat.    Eyes:  Negative for pain, discharge and itching.   Respiratory: Negative for cough, chest tightness, shortness of breath and wheezing.    Cardiovascular: Negative for chest pain, palpitations and leg swelling.   Gastrointestinal: Negative for abdominal pain, constipation, diarrhea, nausea and vomiting.   Endocrine: Negative for cold intolerance, heat intolerance, polydipsia and polyphagia.   Genitourinary: Negative for dysuria, flank pain, frequency, hematuria and urgency.   Musculoskeletal: Positive for back pain. Negative for arthralgias (chronic), gait problem, myalgias, neck pain and neck stiffness.   Skin: Negative for color change, pallor and rash.   Neurological: Negative for seizures, speech difficulty, numbness and headaches.   Psychiatric/Behavioral: Negative for agitation, confusion, decreased concentration and sleep disturbance. The patient is not nervous/anxious.         Depressed     Pulse 78   Wt 59.1 kg (130 lb 6.4 oz)   LMP  (LMP Unknown)   SpO2 99%   Breastfeeding? No   BMI 22.04 kg/m²     Objective   Physical Exam   Constitutional: She is oriented to person, place, and time. She appears well-developed.   HENT:   Head: Normocephalic.   Right Ear: External ear normal.   Left Ear: External ear normal.   Nose: Nose normal.   Mouth/Throat: Oropharynx is clear and moist.   Eyes: Conjunctivae are normal. Pupils are equal, round, and reactive to light.   Neck: No JVD present. No thyromegaly present.   Cardiovascular: Normal rate, regular rhythm and normal heart sounds. Exam reveals no friction rub.   No murmur heard.  Pulmonary/Chest: Effort normal and breath sounds normal. No respiratory distress. She has no wheezes. She has no rales.   Abdominal: Soft. Bowel sounds are normal. She exhibits no distension. There is no tenderness. There is no guarding.   Musculoskeletal: She exhibits no edema or tenderness.   Lymphadenopathy:     She has no cervical adenopathy.   Neurological: She is oriented to person,  place, and time. She displays normal reflexes. No cranial nerve deficit.   Skin: No rash noted.   Psychiatric: Her behavior is normal.   Nursing note and vitals reviewed.      Assessment/Plan   Deidre was seen today for hypertension.    Diagnoses and all orders for this visit:    Essential hypertension  Continue current medications.  Nephrology dc doxazosin to hctz secondary to side effects.  Hyperlipidemia  stable    GERD  stable  Anxiety  Stable. Tried lexapro, zoloft, buspar, atarax and xanax only med that works.   Major depression  Off Pristiq= heart palpitations.     CKD Stage 3  Stable.   Insomnia  Stable on ambien    The patient has read and signed the [facility] Controlled Substance Contract.  I will continue to see patient for regular follow up appointments.  They are well controlled on their medication.  SCOTT is updated every 3 months. The patient is aware of the potential for addiction and dependence.

## 2019-12-04 RX ORDER — NEBIVOLOL HYDROCHLORIDE 5 MG/1
TABLET ORAL
Qty: 30 TABLET | Refills: 1 | Status: SHIPPED | OUTPATIENT
Start: 2019-12-04 | End: 2020-01-30

## 2020-01-02 DIAGNOSIS — F51.01 PRIMARY INSOMNIA: ICD-10-CM

## 2020-01-02 RX ORDER — ZOLPIDEM TARTRATE 10 MG/1
TABLET ORAL
Qty: 30 TABLET | Refills: 3 | Status: SHIPPED | OUTPATIENT
Start: 2020-01-02 | End: 2020-02-24 | Stop reason: SDUPTHER

## 2020-01-02 NOTE — TELEPHONE ENCOUNTER
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    LOV:  11/25/19    Last refill:  09/04/19  Quantity:  30   Refills:  3    Raoul Updated:  01/02/20    Lupe, please advice.  Thank you in advance.

## 2020-01-30 RX ORDER — NEBIVOLOL HYDROCHLORIDE 5 MG/1
TABLET ORAL
Qty: 30 TABLET | Refills: 0 | Status: SHIPPED | OUTPATIENT
Start: 2020-01-30 | End: 2020-03-16

## 2020-02-17 NOTE — TELEPHONE ENCOUNTER
[address] [facility], [facility]  [address], [address] [facility], [address] [facility]  Phone: [phone] Fax: [phone]      Occupational Therapy Re-Certification Plan of Care/MD UPDATE           Occupational Therapy Treatment Note/ Progress Report:     Date:  2020    Patient Name:  Ge Weinstein    :  1988  MRN: [id number]    Medical/Treatment Diagnosis Information:  · Diagnosis: E50.90SZ (ICD-10-CM) - Scapholunate ligament injury with no instability, left   · Treatment Diagnosis: L wrist stiffness - M25.632, L wrist pain - S69.264     Insurance/Certification information:  OT Insurance Information: [facility]  Physician Information:  Referring Practitioner: Steffi Lou MD  Has the plan of care been signed (Y/N):        []  Yes  [x]  No       Visit # Insurance Allowable Auth Required   4 30 []  Yes []  No        Is this a Progress Report:     []  Yes  [x]  No      If Yes:  Date Range for reporting period:  Beginning  Ending    Progress report will be due (10 Rx or 30 days whichever is less): 27    Recertification will be due (POC Duration  / 90 days whichever is less): 3/6/20    Date of Injury: 10/21/19  Date of Surgery: NA     Date of Patient follow up with Physician: 4-6 wks     RESTRICTIONS/PRECAUTIONS:      Latex Allergy:  [x]? No      []? Yes                    Pacemaker:  [x]? No       []? Yes      Preferred Language for Healthcare:   [x]? English       []? 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 Did have some discomfort along radial thumb and FA  Presently not wearing brace or sleeve. Daughter shredded sleeves; would like more for she is going back to work. Has been feeling tightness in forearm.  Going back to work next week and is concerned about pushing down on done   08046/62799)       Paraffin (67468)       US (10769)   too tight          Therapeutic Activity (80297)       35626)                     ADL Training (61892) 47800)       Orthotic Mgmt & Training (74625)              Other:                                     Therapeutic Exercise & NMR:  [x] (79142) Provided verbal/tactile cueing for activities related to strengthening, flexibility, endurance, ROM  for improvements in scapular, scapulothoracic and UE control with self care, reaching, carrying, lifting, house/yardwork, driving/computer work. [x] (15817) Provided verbal/tactile cueing for activities related to improving balance, coordination, kinesthetic sense, posture, motor skill, proprioception  to assist with  scapular, scapulothoracic and UE control with self care, reaching, carrying, lifting, house/yardwork, driving/computer work.     Therapeutic Activities & NMR:    [x] (04079 or 72319) Provided verbal/tactile cueing for activities related to improving balance, coordination, kinesthetic sense, posture, motor skill, proprioception and motor activation to allow for proper function of scapular, scapulothoracic and UE control with self care, carrying, lifting, driving/computer work    Home Exercise Program:    [x] (61838) Reviewed/Progressed HEP activities related to strengthening, flexibility, endurance, ROM of scapular, scapulothoracic and UE control with self care, reaching, carrying, lifting, house/yardwork, driving/computer work  [] (72987) Reviewed/Progressed HEP activities related to improving balance, coordination, kinesthetic sense, posture, motor skill, proprioception of scapular, scapulothoracic and UE control with self care, reaching, carrying, lifting, house/yardwork, driving/computer work      Manual Treatments:  PROM / STM / Oscillations-Mobs:  G-I, II, III, IV (PA's, Inf., Post.)  [x] (20645) Provided manual therapy to mobilize soft tissue/joints of cervical/CT, scapular GHJ and UE for the purpose of modulating pain, promoting relaxation,  increasing ROM, reducing/eliminating soft tissue swelling/inflammation/restriction, improving soft tissue extensibility and allowing for proper ROM for normal function with self care, reaching, carrying, lifting, house/yardwork, driving/computer work    ADL Training:  [] (38083) Provided self-care/home management training related to activities of daily living and compensatory training, and/or use of adaptive equipment      Charges:  Timed Code Treatment Minutes: 50   Total Treatment Minutes: 60   Worker's Comp: Time In/Time Out     [] EVAL (LOW) 13829 (typically 20 minutes face-to-face)    [] EVAL (MOD) 57923 (typically 30 minutes face-to-face)  [] EVAL (HIGH) 12389 (typically 45 minutes face-to-face)  [] OT Re-eval (15533)       [x] Adrianne [phone]) x 2      [] MDQUC(58462)  [] NMR (69703) x      [] Estim (attended) (38266)   [x] Manual ([id number]) x 1     [] US (87156)  [] TA (43228) x      [] Paraffin (97675)  [] ADL  (14629) [address] [place] [address] Katina [id number]    Note: If patient does not return for scheduled/ recommended follow up visits, this note will serve as a discharge from care along with most recent update on progress.

## 2020-02-24 ENCOUNTER — OFFICE VISIT (OUTPATIENT)
Dept: INTERNAL MEDICINE | Facility: CLINIC | Age: 70
End: 2020-02-24

## 2020-02-24 VITALS
WEIGHT: 132 LBS | DIASTOLIC BLOOD PRESSURE: 78 MMHG | HEART RATE: 74 BPM | BODY MASS INDEX: 22.31 KG/M2 | OXYGEN SATURATION: 99 % | SYSTOLIC BLOOD PRESSURE: 126 MMHG

## 2020-02-24 DIAGNOSIS — I10 ESSENTIAL HYPERTENSION: Primary | ICD-10-CM

## 2020-02-24 DIAGNOSIS — F41.9 ANXIETY: ICD-10-CM

## 2020-02-24 DIAGNOSIS — F51.01 PRIMARY INSOMNIA: ICD-10-CM

## 2020-02-24 DIAGNOSIS — E78.5 HYPERLIPIDEMIA LDL GOAL <100: ICD-10-CM

## 2020-02-24 DIAGNOSIS — F33.2 SEVERE EPISODE OF RECURRENT MAJOR DEPRESSIVE DISORDER, WITHOUT PSYCHOTIC FEATURES (HCC): ICD-10-CM

## 2020-02-24 DIAGNOSIS — Z79.899 HIGH RISK MEDICATION USE: ICD-10-CM

## 2020-02-24 PROCEDURE — 99213 OFFICE O/P EST LOW 20 MIN: CPT | Performed by: INTERNAL MEDICINE

## 2020-02-24 RX ORDER — ALPRAZOLAM 0.5 MG/1
0.5 TABLET ORAL 2 TIMES DAILY PRN
Qty: 60 TABLET | Refills: 2 | Status: SHIPPED | OUTPATIENT
Start: 2020-02-24 | End: 2020-07-27 | Stop reason: SDUPTHER

## 2020-02-24 RX ORDER — ZOLPIDEM TARTRATE 10 MG/1
10 TABLET ORAL NIGHTLY PRN
Qty: 30 TABLET | Refills: 3 | Status: SHIPPED | OUTPATIENT
Start: 2020-02-24 | End: 2020-07-27 | Stop reason: SDUPTHER

## 2020-02-24 NOTE — PROGRESS NOTES
Subjective   Deidre Gomez is a 69 y.o. female.   No chief complaint on file.      Hypertension   Associated symptoms include anxiety. Pertinent negatives include no chest pain, headaches, neck pain, palpitations or shortness of breath.   Anxiety   Patient reports no chest pain, confusion, decreased concentration, nausea, nervous/anxious behavior, palpitations or shortness of breath.     Her past medical history is significant for depression.   Heartburn   She reports no abdominal pain, no chest pain, no coughing, no nausea, no sore throat or no wheezing. Pertinent negatives include no fatigue.   Hyperlipidemia   Pertinent negatives include no chest pain, myalgias or shortness of breath.   Depression Patient is not experiencing: confusion, decreased concentration, nervousness/anxiety, palpitations and shortness of breath.         The following portions of the patient's history were reviewed and updated as appropriate: allergies, current medications, past family history, past medical history, past social history, past surgical history and problem list.    Review of Systems   Constitutional: Negative for activity change, appetite change, chills, diaphoresis, fatigue, fever and unexpected weight change.   HENT: Negative for congestion, ear discharge, ear pain, mouth sores, nosebleeds, sinus pressure, sneezing and sore throat.    Eyes: Negative for pain, discharge and itching.   Respiratory: Negative for cough, chest tightness, shortness of breath and wheezing.    Cardiovascular: Negative for chest pain, palpitations and leg swelling.   Gastrointestinal: Negative for abdominal pain, constipation, diarrhea, nausea and vomiting.   Endocrine: Negative for cold intolerance, heat intolerance, polydipsia and polyphagia.   Genitourinary: Negative for dysuria, flank pain, frequency, hematuria and urgency.   Musculoskeletal: Positive for back pain. Negative for arthralgias (chronic), gait problem, myalgias, neck pain and  neck stiffness.   Skin: Negative for color change, pallor and rash.   Neurological: Negative for seizures, speech difficulty, numbness and headaches.   Psychiatric/Behavioral: Negative for agitation, confusion, decreased concentration and sleep disturbance. The patient is not nervous/anxious.         Depressed     /78   Pulse 74   Wt 59.9 kg (132 lb)   LMP  (LMP Unknown)   SpO2 99%   Breastfeeding No   BMI 22.31 kg/m²     Objective   Physical Exam   Constitutional: She is oriented to person, place, and time. She appears well-developed.   HENT:   Head: Normocephalic.   Right Ear: External ear normal.   Left Ear: External ear normal.   Nose: Nose normal.   Mouth/Throat: Oropharynx is clear and moist.   Eyes: Pupils are equal, round, and reactive to light. Conjunctivae are normal.   Neck: No JVD present. No thyromegaly present.   Cardiovascular: Normal rate, regular rhythm and normal heart sounds. Exam reveals no friction rub.   No murmur heard.  Pulmonary/Chest: Effort normal and breath sounds normal. No respiratory distress. She has no wheezes. She has no rales.   Abdominal: Soft. Bowel sounds are normal. She exhibits no distension. There is no tenderness. There is no guarding.   Musculoskeletal: She exhibits no edema or tenderness.   Lymphadenopathy:     She has no cervical adenopathy.   Neurological: She is oriented to person, place, and time. She displays normal reflexes. No cranial nerve deficit.   Skin: No rash noted.   Psychiatric: Her behavior is normal.   Nursing note and vitals reviewed.      Assessment/Plan   Deidre was seen today for hypertension.    Diagnoses and all orders for this visit:    Essential hypertension  Continue current medications.  Nephrology dc doxazosin to hctz secondary to side effects.  Hyperlipidemia  stable    GERD  stable  Anxiety  Stable. Tried lexapro, zoloft, buspar, atarax and xanax only med that works.   Major depression  Off Pristiq= heart palpitations.  Will not  take anything    CKD Stage 3  Stable.   Insomnia  Can not wean off ambien    The patient has read and signed the [facility] Controlled Substance Contract.  I will continue to see patient for regular follow up appointments.  They are well controlled on their medication.  SCOTT is updated every 3 months. The patient is aware of the potential for addiction and dependence.

## 2020-03-16 RX ORDER — NEBIVOLOL HYDROCHLORIDE 5 MG/1
TABLET ORAL
Qty: 30 TABLET | Refills: 3 | Status: SHIPPED | OUTPATIENT
Start: 2020-03-16 | End: 2022-03-07

## 2020-03-16 NOTE — TELEPHONE ENCOUNTER
Caitlin from [facility] requested a refill of BYSTOLIC 5 MG tablet    [facility] [address] in [place] confirmed    Please call and advise. [facility] call back [phone]

## 2020-07-18 ENCOUNTER — TELEPHONE (OUTPATIENT)
Dept: INTERNAL MEDICINE | Facility: CLINIC | Age: 70
End: 2020-07-18

## 2020-07-18 NOTE — TELEPHONE ENCOUNTER
Patient called asking a refill on bystolic.  Given 30 day Rx until her appointment with you this month.

## 2020-07-24 RX ORDER — DOXAZOSIN MESYLATE 4 MG/1
TABLET ORAL
COMMUNITY
End: 2020-07-27

## 2020-07-24 RX ORDER — TRAMADOL HYDROCHLORIDE 50 MG/1
TABLET ORAL
COMMUNITY
End: 2020-07-27

## 2020-07-24 RX ORDER — LOSARTAN POTASSIUM 100 MG/1
TABLET ORAL
COMMUNITY
End: 2020-07-27

## 2020-07-24 RX ORDER — BUPROPION HYDROCHLORIDE 150 MG/1
TABLET, EXTENDED RELEASE ORAL
COMMUNITY
End: 2020-07-27

## 2020-07-27 ENCOUNTER — OFFICE VISIT (OUTPATIENT)
Dept: INTERNAL MEDICINE | Facility: CLINIC | Age: 70
End: 2020-07-27

## 2020-07-27 ENCOUNTER — LAB REQUISITION (OUTPATIENT)
Dept: LAB | Facility: HOSPITAL | Age: 70
End: 2020-07-27

## 2020-07-27 VITALS
OXYGEN SATURATION: 97 % | SYSTOLIC BLOOD PRESSURE: 130 MMHG | HEART RATE: 66 BPM | BODY MASS INDEX: 21.92 KG/M2 | HEIGHT: 65 IN | WEIGHT: 131.6 LBS | DIASTOLIC BLOOD PRESSURE: 80 MMHG

## 2020-07-27 DIAGNOSIS — E78.5 HYPERLIPIDEMIA LDL GOAL <100: ICD-10-CM

## 2020-07-27 DIAGNOSIS — F51.01 PRIMARY INSOMNIA: ICD-10-CM

## 2020-07-27 DIAGNOSIS — R73.9 HYPERGLYCEMIA: ICD-10-CM

## 2020-07-27 DIAGNOSIS — M81.0 AGE-RELATED OSTEOPOROSIS WITHOUT CURRENT PATHOLOGICAL FRACTURE: ICD-10-CM

## 2020-07-27 DIAGNOSIS — N18.30 CHRONIC KIDNEY DISEASE, STAGE 3 (HCC): ICD-10-CM

## 2020-07-27 DIAGNOSIS — R92.1 BREAST CALCIFICATION SEEN ON MAMMOGRAM: ICD-10-CM

## 2020-07-27 DIAGNOSIS — J30.1 SEASONAL ALLERGIC RHINITIS DUE TO POLLEN: ICD-10-CM

## 2020-07-27 DIAGNOSIS — K21.9 GASTROESOPHAGEAL REFLUX DISEASE, ESOPHAGITIS PRESENCE NOT SPECIFIED: ICD-10-CM

## 2020-07-27 DIAGNOSIS — I10 ESSENTIAL HYPERTENSION: ICD-10-CM

## 2020-07-27 DIAGNOSIS — E04.2 MULTINODULAR THYROID: ICD-10-CM

## 2020-07-27 DIAGNOSIS — G25.81 RESTLESS LEGS SYNDROME (RLS): ICD-10-CM

## 2020-07-27 DIAGNOSIS — F51.04 CHRONIC INSOMNIA: ICD-10-CM

## 2020-07-27 DIAGNOSIS — G56.03 BILATERAL CARPAL TUNNEL SYNDROME: ICD-10-CM

## 2020-07-27 DIAGNOSIS — Z00.00 ROUTINE GENERAL MEDICAL EXAMINATION AT A HEALTH CARE FACILITY: ICD-10-CM

## 2020-07-27 DIAGNOSIS — F41.9 ANXIETY: ICD-10-CM

## 2020-07-27 DIAGNOSIS — Z00.00 MEDICARE ANNUAL WELLNESS VISIT, SUBSEQUENT: Primary | ICD-10-CM

## 2020-07-27 DIAGNOSIS — F33.2 SEVERE EPISODE OF RECURRENT MAJOR DEPRESSIVE DISORDER, WITHOUT PSYCHOTIC FEATURES (HCC): ICD-10-CM

## 2020-07-27 PROCEDURE — 96160 PT-FOCUSED HLTH RISK ASSMT: CPT | Performed by: INTERNAL MEDICINE

## 2020-07-27 PROCEDURE — 36415 COLL VENOUS BLD VENIPUNCTURE: CPT | Performed by: INTERNAL MEDICINE

## 2020-07-27 PROCEDURE — G0439 PPPS, SUBSEQ VISIT: HCPCS | Performed by: INTERNAL MEDICINE

## 2020-07-27 RX ORDER — ALPRAZOLAM 0.5 MG/1
0.5 TABLET ORAL 2 TIMES DAILY PRN
Qty: 60 TABLET | Refills: 2 | Status: SHIPPED | OUTPATIENT
Start: 2020-07-27 | End: 2020-11-30 | Stop reason: SDUPTHER

## 2020-07-27 RX ORDER — ZOLPIDEM TARTRATE 10 MG/1
10 TABLET ORAL NIGHTLY PRN
Qty: 30 TABLET | Refills: 3 | Status: SHIPPED | OUTPATIENT
Start: 2020-07-27 | End: 2020-08-04 | Stop reason: SDUPTHER

## 2020-07-27 NOTE — PROGRESS NOTES
The ABCs of the Annual Wellness Visit  Subsequent Medicare Wellness Visit    Chief Complaint   Patient presents with   • Medicare Wellness-subsequent       Subjective   History of Present Illness:  Deidre Gomez is a 70 y.o. female who presents for a Subsequent Medicare Wellness Visit.    HEALTH RISK ASSESSMENT    Recent Hospitalizations:  No hospitalization(s) within the last year.    Current Medical Providers:  Patient Care Team:  Aspen Salas DO as PCP - General  Aspen Salas DO as PCP - Family Medicine    Smoking Status:  Social History     Tobacco Use   Smoking Status Former Smoker   • Last attempt to quit: 2/3/2019   • Years since quittin.4   Smokeless Tobacco Never Used       Alcohol Consumption:  Social History     Substance and Sexual Activity   Alcohol Use No       Depression Screen:   PHQ-2/PHQ-9 Depression Screening 2020   Little interest or pleasure in doing things 3   Feeling down, depressed, or hopeless 3   Trouble falling or staying asleep, or sleeping too much 3   Feeling tired or having little energy 3   Poor appetite or overeating 3   Feeling bad about yourself - or that you are a failure or have let yourself or your family down 3   Trouble concentrating on things, such as reading the newspaper or watching television 3   Moving or speaking so slowly that other people could have noticed. Or the opposite - being so fidgety or restless that you have been moving around a lot more than usual 0   Thoughts that you would be better off dead, or of hurting yourself in some way 2   Total Score 23   If you checked off any problems, how difficult have these problems made it for you to do your work, take care of things at home, or get along with other people? Very difficult       Fall Risk Screen:  STEADI Fall Risk Assessment was completed, and patient is at LOW risk for falls.Assessment completed on:2020    Health Habits and Functional and Cognitive Screening:  Functional &  Cognitive Status 7/27/2020   Do you have difficulty preparing food and eating? No   Do you have difficulty bathing yourself, getting dressed or grooming yourself? No   Do you have difficulty using the toilet? No   Do you have difficulty moving around from place to place? No   Do you have trouble with steps or getting out of a bed or a chair? No   Current Diet Low Fat Diet   Dental Exam Up to date   Eye Exam Up to date   Exercise (times per week) 5 times per week   Current Exercise Activities Include Walking   Do you need help using the phone?  No   Are you deaf or do you have serious difficulty hearing?  No   Do you need help with transportation? No   Do you need help shopping? No   Do you need help preparing meals?  No   Do you need help with housework?  No   Do you need help with laundry? No   Do you need help taking your medications? No   Do you need help managing money? No   Do you ever drive or ride in a car without wearing a seat belt? No   Have you felt unusual stress, anger or loneliness in the last month? -   Who do you live with? -   If you need help, do you have trouble finding someone available to you? -   Have you been bothered in the last four weeks by sexual problems? -   Do you have difficulty concentrating, remembering or making decisions? -         Does the patient have evidence of cognitive impairment? No    Asprin use counseling:Does not need ASA (and currently is not on it)    Age-appropriate Screening Schedule:  Refer to the list below for future screening recommendations based on patient's age, sex and/or medical conditions. Orders for these recommended tests are listed in the plan section. The patient has been provided with a written plan.    Health Maintenance   Topic Date Due   • TDAP/TD VACCINES (1 - Tdap) 07/22/1961   • DXA SCAN  04/24/2020   • LIPID PANEL  07/24/2020   • MAMMOGRAM  07/27/2020 (Originally 6/27/2020)   • ZOSTER VACCINE (1 of 2) 07/27/2020 (Originally 7/22/2000)   •  INFLUENZA VACCINE  08/01/2020   • HEMOGLOBIN A1C  07/27/2021   • COLONOSCOPY  06/28/2027   • DIABETIC FOOT EXAM  Discontinued   • DIABETIC EYE EXAM  Discontinued   • URINE MICROALBUMIN  Discontinued          The following portions of the patient's history were reviewed and updated as appropriate: allergies, current medications, past family history, past medical history, past social history, past surgical history and problem list.    Outpatient Medications Prior to Visit   Medication Sig Dispense Refill   • BYSTOLIC 5 MG tablet Take 1 tablet by mouth once daily (Patient taking differently: 7.5 mg.) 30 tablet 3   • Ergocalciferol (VITAMIN D2 PO) Vitamin D2 1,250 mcg (50,000 unit) capsule     • ALPRAZolam (XANAX) 0.5 MG tablet Take 1 tablet by mouth 2 (Two) Times a Day As Needed for Anxiety. 60 tablet 2   • zolpidem (AMBIEN) 10 MG tablet Take 1 tablet by mouth At Night As Needed for Sleep. for sleep 30 tablet 3   • buPROPion SR (WELLBUTRIN SR) 150 MG 12 hr tablet bupropion HCl  mg tablet,12 hr sustained-release     • diclofenac (Voltaren) 1 % gel gel Voltaren 1 % topical gel     • doxazosin (CARDURA) 4 MG tablet doxazosin 4 mg tablet     • hydrochlorothiazide (HYDRODIURIL) 25 MG tablet Daily.     • losartan (COZAAR) 100 MG tablet losartan 100 mg tablet     • traMADol (ULTRAM) 50 MG tablet tramadol 50 mg tablet       No facility-administered medications prior to visit.        Patient Active Problem List   Diagnosis   • Gastroesophageal reflux disease   • Hyperlipidemia LDL goal <100   • Essential hypertension   • Chronic insomnia   • Allergic rhinitis   • Anxiety   • Bipolar 1 disorder (CMS/HCC)   • Multinodular thyroid   • Age-related osteoporosis without current pathological fracture   • Restless legs syndrome (RLS)   • Suspected sleep apnea   • Breast calcification seen on mammogram   • Bilateral carpal tunnel syndrome   • Severe episode of recurrent major depressive disorder, without psychotic features  "(CMS/[facility])   • Chronic kidney disease, stage 3 (CMS/[facility])       Advanced Care Planning:  ACP discussion was held with the patient during this visit. Patient does not have an advance directive, information provided.    Review of Systems   Constitutional: Negative for activity change, appetite change, chills, diaphoresis, fatigue, fever and unexpected weight change.   HENT: Negative for congestion, ear discharge, ear pain, mouth sores, nosebleeds, sinus pressure, sneezing and sore throat.    Eyes: Negative for pain, discharge and itching.   Respiratory: Negative for cough, chest tightness, shortness of breath and wheezing.    Cardiovascular: Negative for chest pain, palpitations and leg swelling.   Gastrointestinal: Negative for abdominal pain, constipation, diarrhea, nausea and vomiting.   Endocrine: Negative for cold intolerance, heat intolerance, polydipsia and polyphagia.   Genitourinary: Negative for dysuria, flank pain, frequency, hematuria and urgency.   Musculoskeletal: Positive for arthralgias and myalgias. Negative for back pain, gait problem, neck pain and neck stiffness.   Skin: Negative for color change, pallor and rash.   Neurological: Negative for seizures, speech difficulty, numbness and headaches.   Psychiatric/Behavioral: Negative for agitation, confusion, decreased concentration and sleep disturbance. The patient is nervous/anxious.        Compared to one year ago, the patient feels her physical health is the same.  Compared to one year ago, the patient feels her mental health is the same.    Reviewed chart for potential of high risk medication in the elderly: yes  Reviewed chart for potential of harmful drug interactions in the elderly:yes    Objective         Vitals:    07/27/20 1410   BP: 130/80   Pulse: 66   SpO2: 97%   Weight: 59.7 kg (131 lb 9.6 oz)   Height: 163.8 cm (64.5\")   PainSc: 0-No pain       Body mass index is 22.24 kg/m².  Discussed the patient's BMI with her. The BMI is above " average; BMI management plan is completed.    Physical Exam   Constitutional: She is oriented to person, place, and time. She appears well-developed.   HENT:   Head: Normocephalic.   Right Ear: External ear normal.   Left Ear: External ear normal.   Nose: Nose normal.   Mouth/Throat: Oropharynx is clear and moist.   Eyes: Pupils are equal, round, and reactive to light. Conjunctivae are normal.   Neck: No JVD present. No thyromegaly present.   Cardiovascular: Normal rate, regular rhythm and normal heart sounds. Exam reveals no friction rub.   No murmur heard.  Pulmonary/Chest: Effort normal and breath sounds normal. No respiratory distress. She has no wheezes. She has no rales.   Abdominal: Soft. Bowel sounds are normal. She exhibits no distension. There is no tenderness. There is no guarding.   Musculoskeletal: She exhibits no edema or tenderness.   Lymphadenopathy:     She has no cervical adenopathy.   Neurological: She is alert and oriented to person, place, and time. She displays normal reflexes. No cranial nerve deficit.   Skin: No rash noted.   Psychiatric: Her behavior is normal.   Nursing note and vitals reviewed.            Assessment/Plan   Medicare Risks and Personalized Health Plan  CMS Preventative Services Quick Reference  anxiety    The above risks/problems have been discussed with the patient.  Pertinent information has been shared with the patient in the After Visit Summary.  Follow up plans and orders are seen below in the Assessment/Plan Section.    Diagnoses and all orders for this visit:    1. Medicare annual wellness visit, subsequent (Primary)  Done today  2. Primary insomnia  -     zolpidem (AMBIEN) 10 MG tablet; Take 1 tablet by mouth At Night As Needed for Sleep. for sleep  Dispense: 30 tablet; Refill: 3    3. Anxiety  -     ALPRAZolam (XANAX) 0.5 MG tablet; Take 1 tablet by mouth 2 (Two) Times a Day As Needed for Anxiety.  Dispense: 60 tablet; Refill: 2  Has tried zoloft, pristiq,  wellbutrin, paxil, prozac, celexa. None of them work for her.   4. Essential hypertension  -     CBC & Differential; Future  -     Comprehensive Metabolic Panel; Future    5. Hyperlipidemia LDL goal <100  -     Lipid Panel; Future    6. Seasonal allergic rhinitis due to pollen  stable  7. Gastroesophageal reflux disease, esophagitis presence not specified  stable  8. Multinodular thyroid  stable  9. Bilateral carpal tunnel syndrome  stable  10. Chronic kidney disease, stage 3 (CMS/HCC)  stable  11. Breast calcification seen on mammogram  Refuses to go back despite recommendation of a biopsy.   12. Chronic insomnia  Stable on ambien  13. Restless legs syndrome (RLS)  stable  14. Severe episode of recurrent major depressive disorder, without psychotic features (CMS/HCC)  Failexd zoloft, celexa, paxil, prozac, wellbutrin, lexapro, pristiq. Refuses to go back to psy.   15. Age-related osteoporosis without current pathological fracture  Won't take Bisphosphonates  16. Hyperglycemia  -     Hemoglobin A1c; Future      Follow Up:  Return in about 1 year (around 7/27/2021) for Medicare Wellness.     An After Visit Summary and PPPS were given to the patient.

## 2020-07-28 LAB
ALBUMIN SERPL-MCNC: 5.1 G/DL (ref 3.5–5.2)
ALBUMIN/GLOB SERPL: 2.8 G/DL
ALP SERPL-CCNC: 66 U/L (ref 39–117)
ALT SERPL-CCNC: 7 U/L (ref 1–33)
AST SERPL-CCNC: 19 U/L (ref 1–32)
BASOPHILS # BLD AUTO: 0.03 10*3/MM3 (ref 0–0.2)
BASOPHILS NFR BLD AUTO: 0.4 % (ref 0–1.5)
BILIRUB SERPL-MCNC: 0.4 MG/DL (ref 0–1.2)
BUN SERPL-MCNC: 14 MG/DL (ref 8–23)
BUN/CREAT SERPL: 13 (ref 7–25)
CALCIUM SERPL-MCNC: 9.7 MG/DL (ref 8.6–10.5)
CHLORIDE SERPL-SCNC: 103 MMOL/L (ref 98–107)
CHOLEST SERPL-MCNC: 197 MG/DL (ref 0–200)
CO2 SERPL-SCNC: 23 MMOL/L (ref 22–29)
CREAT SERPL-MCNC: 1.08 MG/DL (ref 0.57–1)
EOSINOPHIL # BLD AUTO: 0.01 10*3/MM3 (ref 0–0.4)
EOSINOPHIL NFR BLD AUTO: 0.1 % (ref 0.3–6.2)
ERYTHROCYTE [DISTWIDTH] IN BLOOD BY AUTOMATED COUNT: 14 % (ref 12.3–15.4)
GLOBULIN SER CALC-MCNC: 1.8 GM/DL
GLUCOSE SERPL-MCNC: 99 MG/DL (ref 65–99)
HBA1C MFR BLD: 5.6 % (ref 4.8–5.6)
HCT VFR BLD AUTO: 43.9 % (ref 34–46.6)
HDLC SERPL-MCNC: 88 MG/DL (ref 40–60)
HGB BLD-MCNC: 14.7 G/DL (ref 12–15.9)
IMM GRANULOCYTES # BLD AUTO: 0.03 10*3/MM3 (ref 0–0.05)
IMM GRANULOCYTES NFR BLD AUTO: 0.4 % (ref 0–0.5)
LDLC SERPL CALC-MCNC: 80 MG/DL (ref 0–100)
LYMPHOCYTES # BLD AUTO: 2.75 10*3/MM3 (ref 0.7–3.1)
LYMPHOCYTES NFR BLD AUTO: 36.3 % (ref 19.6–45.3)
MCH RBC QN AUTO: 30.8 PG (ref 26.6–33)
MCHC RBC AUTO-ENTMCNC: 33.5 G/DL (ref 31.5–35.7)
MCV RBC AUTO: 91.8 FL (ref 79–97)
MONOCYTES # BLD AUTO: 0.62 10*3/MM3 (ref 0.1–0.9)
MONOCYTES NFR BLD AUTO: 8.2 % (ref 5–12)
NEUTROPHILS # BLD AUTO: 4.14 10*3/MM3 (ref 1.7–7)
NEUTROPHILS NFR BLD AUTO: 54.6 % (ref 42.7–76)
NRBC BLD AUTO-RTO: 0 /100 WBC (ref 0–0.2)
PLATELET # BLD AUTO: 167 10*3/MM3 (ref 140–450)
POTASSIUM SERPL-SCNC: 4.2 MMOL/L (ref 3.5–5.2)
PROT SERPL-MCNC: 6.9 G/DL (ref 6–8.5)
RBC # BLD AUTO: 4.78 10*6/MM3 (ref 3.77–5.28)
SODIUM SERPL-SCNC: 139 MMOL/L (ref 136–145)
TRIGL SERPL-MCNC: 145 MG/DL (ref 0–150)
VLDLC SERPL CALC-MCNC: 29 MG/DL
WBC # BLD AUTO: 7.58 10*3/MM3 (ref 3.4–10.8)

## 2020-07-30 DIAGNOSIS — F51.01 PRIMARY INSOMNIA: ICD-10-CM

## 2020-07-30 RX ORDER — ZOLPIDEM TARTRATE 10 MG/1
10 TABLET ORAL NIGHTLY PRN
Qty: 30 TABLET | Refills: 3 | OUTPATIENT
Start: 2020-07-30

## 2020-08-04 DIAGNOSIS — F51.01 PRIMARY INSOMNIA: ICD-10-CM

## 2020-08-04 RX ORDER — ZOLPIDEM TARTRATE 10 MG/1
10 TABLET ORAL NIGHTLY PRN
Qty: 30 TABLET | Refills: 3 | Status: SHIPPED | OUTPATIENT
Start: 2020-08-04 | End: 2020-08-04 | Stop reason: SDUPTHER

## 2020-08-04 RX ORDER — ZOLPIDEM TARTRATE 10 MG/1
10 TABLET ORAL NIGHTLY PRN
Qty: 30 TABLET | Refills: 3 | Status: SHIPPED | OUTPATIENT
Start: 2020-08-04 | End: 2021-03-22 | Stop reason: SDUPTHER

## 2020-08-04 NOTE — TELEPHONE ENCOUNTER
Patient needs ambien rx sent to the [facility] pharmacy on [address] it was sent to a different pharmacy.     This is number for pharmacy [phone]

## 2020-09-03 ENCOUNTER — TELEPHONE (OUTPATIENT)
Dept: INTERNAL MEDICINE | Facility: CLINIC | Age: 70
End: 2020-09-03

## 2020-09-03 NOTE — TELEPHONE ENCOUNTER
PATIENT IS REPLYING TO TIFFANIE'S MISSED CALL REGARDING QUESTIONS SHE HAD FOR PATIENT ON PAPERWORK. 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 [phone]

## 2020-11-30 ENCOUNTER — LAB REQUISITION (OUTPATIENT)
Dept: LAB | Facility: HOSPITAL | Age: 70
End: 2020-11-30

## 2020-11-30 ENCOUNTER — OFFICE VISIT (OUTPATIENT)
Dept: INTERNAL MEDICINE | Facility: CLINIC | Age: 70
End: 2020-11-30

## 2020-11-30 ENCOUNTER — RESULTS ENCOUNTER (OUTPATIENT)
Dept: INTERNAL MEDICINE | Facility: CLINIC | Age: 70
End: 2020-11-30

## 2020-11-30 VITALS
DIASTOLIC BLOOD PRESSURE: 81 MMHG | OXYGEN SATURATION: 98 % | WEIGHT: 137 LBS | BODY MASS INDEX: 22.82 KG/M2 | HEART RATE: 64 BPM | TEMPERATURE: 96.6 F | SYSTOLIC BLOOD PRESSURE: 139 MMHG | HEIGHT: 65 IN

## 2020-11-30 DIAGNOSIS — Z00.00 ROUTINE GENERAL MEDICAL EXAMINATION AT A HEALTH CARE FACILITY: ICD-10-CM

## 2020-11-30 DIAGNOSIS — Z79.899 LONG TERM USE OF DRUG: ICD-10-CM

## 2020-11-30 DIAGNOSIS — F41.9 ANXIETY: ICD-10-CM

## 2020-11-30 DIAGNOSIS — F51.04 CHRONIC INSOMNIA: ICD-10-CM

## 2020-11-30 DIAGNOSIS — I10 ESSENTIAL HYPERTENSION: ICD-10-CM

## 2020-11-30 DIAGNOSIS — E78.5 HYPERLIPIDEMIA LDL GOAL <100: ICD-10-CM

## 2020-11-30 DIAGNOSIS — K21.00 GASTROESOPHAGEAL REFLUX DISEASE WITH ESOPHAGITIS WITHOUT HEMORRHAGE: ICD-10-CM

## 2020-11-30 DIAGNOSIS — R63.5 WEIGHT GAIN: ICD-10-CM

## 2020-11-30 DIAGNOSIS — Z79.899 LONG TERM USE OF DRUG: Primary | ICD-10-CM

## 2020-11-30 DIAGNOSIS — F33.2 SEVERE EPISODE OF RECURRENT MAJOR DEPRESSIVE DISORDER, WITHOUT PSYCHOTIC FEATURES (HCC): ICD-10-CM

## 2020-11-30 DIAGNOSIS — N18.30 STAGE 3 CHRONIC KIDNEY DISEASE, UNSPECIFIED WHETHER STAGE 3A OR 3B CKD (HCC): ICD-10-CM

## 2020-11-30 DIAGNOSIS — E87.5 HYPERKALEMIA: ICD-10-CM

## 2020-11-30 PROCEDURE — 36415 COLL VENOUS BLD VENIPUNCTURE: CPT | Performed by: INTERNAL MEDICINE

## 2020-11-30 PROCEDURE — 99214 OFFICE O/P EST MOD 30 MIN: CPT | Performed by: INTERNAL MEDICINE

## 2020-11-30 RX ORDER — BUPROPION HYDROCHLORIDE 150 MG/1
150 TABLET ORAL DAILY
Qty: 30 TABLET | Refills: 1 | Status: SHIPPED | OUTPATIENT
Start: 2020-11-30 | End: 2021-02-22

## 2020-11-30 RX ORDER — ALPRAZOLAM 0.5 MG/1
0.5 TABLET ORAL 2 TIMES DAILY PRN
Qty: 60 TABLET | Refills: 2 | Status: SHIPPED | OUTPATIENT
Start: 2020-11-30 | End: 2022-02-10 | Stop reason: SDUPTHER

## 2020-11-30 NOTE — PROGRESS NOTES
Subjective   Deidre Gomez is a 70 y.o. female.   Chief Complaint   Patient presents with   • Hypertension       Hypertension  Associated symptoms include anxiety. Pertinent negatives include no chest pain, headaches, neck pain, palpitations or shortness of breath.   Anxiety  Patient reports no chest pain, confusion, decreased concentration, nausea, nervous/anxious behavior, palpitations or shortness of breath.     Her past medical history is significant for depression.   Heartburn  She reports no abdominal pain, no chest pain, no coughing, no nausea, no sore throat or no wheezing. Pertinent negatives include no fatigue.   Hyperlipidemia  Pertinent negatives include no chest pain, myalgias or shortness of breath.   DepressionPatient is not experiencing: confusion, decreased concentration, nervousness/anxiety, palpitations and shortness of breath.       Depression back. She stopped her Wellbutrin which has helped in the past.. More tearful at time. No suicidal or homicidal ideations. Not seeing a therapist anymore.  The following portions of the patient's history were reviewed and updated as appropriate: allergies, current medications, past family history, past medical history, past social history, past surgical history and problem list.    Review of Systems   Constitutional: Positive for unexpected weight change (weight gain). Negative for activity change, appetite change, chills, diaphoresis, fatigue and fever.   HENT: Negative for congestion, ear discharge, ear pain, mouth sores, nosebleeds, sinus pressure, sneezing and sore throat.    Eyes: Negative for pain, discharge and itching.   Respiratory: Negative for cough, chest tightness, shortness of breath and wheezing.    Cardiovascular: Negative for chest pain, palpitations and leg swelling.   Gastrointestinal: Negative for abdominal pain, constipation, diarrhea, nausea and vomiting.   Endocrine: Negative for cold intolerance, heat intolerance, polydipsia and  "polyphagia.   Genitourinary: Negative for dysuria, flank pain, frequency, hematuria and urgency.   Musculoskeletal: Positive for back pain. Negative for arthralgias (chronic), gait problem, myalgias, neck pain and neck stiffness.   Skin: Negative for color change, pallor and rash.   Neurological: Negative for seizures, speech difficulty, numbness and headaches.   Psychiatric/Behavioral: Negative for agitation, confusion, decreased concentration and sleep disturbance. The patient is not nervous/anxious.         Depressed     /90   Pulse 64   Temp 96.6 °F (35.9 °C)   Ht 163.8 cm (64.5\")   Wt 62.1 kg (137 lb)   LMP  (LMP Unknown)   SpO2 98%   BMI 23.15 kg/m²     Objective   Physical Exam   Constitutional: She is oriented to person, place, and time. She appears well-developed.   HENT:   Head: Normocephalic.   Right Ear: External ear normal.   Left Ear: External ear normal.   Nose: Nose normal.   Eyes: Pupils are equal, round, and reactive to light. Conjunctivae are normal.   Neck: No JVD present. No thyromegaly present.   Cardiovascular: Normal rate, regular rhythm and normal heart sounds. Exam reveals no friction rub.   No murmur heard.  Pulmonary/Chest: Effort normal and breath sounds normal. No respiratory distress. She has no wheezes. She has no rales.   Abdominal: Soft. Bowel sounds are normal. She exhibits no distension. There is no abdominal tenderness. There is no guarding.   Musculoskeletal: No tenderness.   Lymphadenopathy:     She has no cervical adenopathy.   Neurological: She is oriented to person, place, and time. She displays normal reflexes. No cranial nerve deficit.   Skin: No rash noted.   Psychiatric: Her behavior is normal.   Nursing note and vitals reviewed.      Assessment/Plan   Deidre was seen today for hypertension.    Diagnoses and all orders for this visit:    Essential hypertension  Continue current medications.  Bystolic is only med does not have side " effects.cbc  Hyperlipidemia  Stable cmp and lipiods    GERD  stable  Anxiety  Stable. Tried lexapro, zoloft, buspar, atarax and xanax only med that works.   Major depression  Off Pristiq= heart palpitations.  Added back her wellbutrin  today. Encouraged her to restart counseling. She has number for psy ED at  if sxs worsen.     CKD Stage 3  Stable.   Insomnia  Can not wean off ambien  Weight gain  tsh    The patient has read and signed the [facility] Controlled Substance Contract.  I will continue to see patient for regular follow up appointments.  They are well controlled on their medication.  SCOTT is updated every 3 months. The patient is aware of the potential for addiction and dependence.

## 2020-12-01 LAB
ALBUMIN SERPL-MCNC: 4.7 G/DL (ref 3.8–4.8)
ALBUMIN/GLOB SERPL: 2.1 {RATIO} (ref 1.2–2.2)
ALP SERPL-CCNC: 89 IU/L (ref 39–117)
ALT SERPL-CCNC: 6 IU/L (ref 0–32)
AST SERPL-CCNC: 22 IU/L (ref 0–40)
BASOPHILS # BLD AUTO: 0 X10E3/UL (ref 0–0.2)
BASOPHILS NFR BLD AUTO: 1 %
BILIRUB SERPL-MCNC: 0.3 MG/DL (ref 0–1.2)
BUN SERPL-MCNC: 16 MG/DL (ref 8–27)
BUN/CREAT SERPL: 15 (ref 12–28)
CALCIUM SERPL-MCNC: 10.1 MG/DL (ref 8.7–10.3)
CHLORIDE SERPL-SCNC: 101 MMOL/L (ref 96–106)
CO2 SERPL-SCNC: 23 MMOL/L (ref 20–29)
CREAT SERPL-MCNC: 1.06 MG/DL (ref 0.57–1)
EOSINOPHIL # BLD AUTO: 0.1 X10E3/UL (ref 0–0.4)
EOSINOPHIL NFR BLD AUTO: 2 %
ERYTHROCYTE [DISTWIDTH] IN BLOOD BY AUTOMATED COUNT: 13.2 % (ref 11.7–15.4)
GLOBULIN SER CALC-MCNC: 2.2 G/DL (ref 1.5–4.5)
GLUCOSE SERPL-MCNC: 90 MG/DL (ref 65–99)
HCT VFR BLD AUTO: 43.8 % (ref 34–46.6)
HGB BLD-MCNC: 15 G/DL (ref 11.1–15.9)
IMM GRANULOCYTES # BLD AUTO: 0 X10E3/UL (ref 0–0.1)
IMM GRANULOCYTES NFR BLD AUTO: 0 %
LYMPHOCYTES # BLD AUTO: 1.9 X10E3/UL (ref 0.7–3.1)
LYMPHOCYTES NFR BLD AUTO: 34 %
MCH RBC QN AUTO: 29.9 PG (ref 26.6–33)
MCHC RBC AUTO-ENTMCNC: 34.2 G/DL (ref 31.5–35.7)
MCV RBC AUTO: 87 FL (ref 79–97)
MONOCYTES # BLD AUTO: 0.6 X10E3/UL (ref 0.1–0.9)
MONOCYTES NFR BLD AUTO: 11 %
NEUTROPHILS # BLD AUTO: 2.9 X10E3/UL (ref 1.4–7)
NEUTROPHILS NFR BLD AUTO: 52 %
PLATELET # BLD AUTO: 175 X10E3/UL (ref 150–450)
POTASSIUM SERPL-SCNC: 5.6 MMOL/L (ref 3.5–5.2)
PROT SERPL-MCNC: 6.9 G/DL (ref 6–8.5)
RBC # BLD AUTO: 5.01 X10E6/UL (ref 3.77–5.28)
SODIUM SERPL-SCNC: 138 MMOL/L (ref 134–144)
TSH SERPL DL<=0.005 MIU/L-ACNC: 2.14 UIU/ML (ref 0.45–4.5)
WBC # BLD AUTO: 5.5 X10E3/UL (ref 3.4–10.8)

## 2020-12-02 DIAGNOSIS — E87.5 HYPERKALEMIA: Primary | ICD-10-CM

## 2021-02-12 ENCOUNTER — TELEPHONE (OUTPATIENT)
Dept: INTERNAL MEDICINE | Facility: CLINIC | Age: 71
End: 2021-02-12

## 2021-02-12 ENCOUNTER — LAB (OUTPATIENT)
Dept: LAB | Facility: HOSPITAL | Age: 71
End: 2021-02-12

## 2021-02-12 DIAGNOSIS — E87.5 HYPERKALEMIA: ICD-10-CM

## 2021-02-12 NOTE — TELEPHONE ENCOUNTER
Nelida from [facility] is calling to have labs ordered for the patient.  The patient is wanting to have the last labs that were done as well sent to  [facility]....   Please advise

## 2021-02-15 ENCOUNTER — TELEPHONE (OUTPATIENT)
Dept: INTERNAL MEDICINE | Facility: CLINIC | Age: 71
End: 2021-02-15

## 2021-02-15 NOTE — TELEPHONE ENCOUNTER
PATIENT CALLED TO CHECK STATUS OF LABS DONE 02/12.    PATIENT STATED SHE HAS AN APPOINTMENT WITH HER NEPHROLOGIST TODAY @ 1PM AND NEEDING HER RESULTS PRIOR TO THAT APPOINTMENT.        PLEASE ADVISE:  [phone]

## 2021-02-22 DIAGNOSIS — F33.2 SEVERE EPISODE OF RECURRENT MAJOR DEPRESSIVE DISORDER, WITHOUT PSYCHOTIC FEATURES (HCC): ICD-10-CM

## 2021-02-22 RX ORDER — BUPROPION HYDROCHLORIDE 150 MG/1
TABLET ORAL
Qty: 90 TABLET | Refills: 0 | Status: SHIPPED | OUTPATIENT
Start: 2021-02-22 | End: 2021-06-14

## 2021-02-22 NOTE — TELEPHONE ENCOUNTER
Last Office Visit: 11/30/20  Next Office Visit:03/01/21    Labs completed in past 6 months? yes  Labs completed in past year? yes    Last Refill Date: 11/30/20  Quantity:30  Refills:1    Pharmacy:

## 2021-02-23 ENCOUNTER — TELEPHONE (OUTPATIENT)
Dept: INTERNAL MEDICINE | Facility: CLINIC | Age: 71
End: 2021-02-23

## 2021-02-23 NOTE — TELEPHONE ENCOUNTER
Caller: Deidre Gomez    Relationship: Self    Best call back number: [phone]    Caller requesting test results: YES    What test was performed: LABS    When was the test performed: 02/12/21    Where was the test performed:     Additional notes:PATIENT WOULD LIKE A CALLBACK TO DISCUSS LAB RESULTS AND SHE HAS OTHER QUESTIONS PERTAINING TO HAVING THOSE RESULTS SENT TO ANOTHER PROVIDER PLEASE ADVISE

## 2021-03-22 ENCOUNTER — OFFICE VISIT (OUTPATIENT)
Dept: INTERNAL MEDICINE | Facility: CLINIC | Age: 71
End: 2021-03-22

## 2021-03-22 VITALS
HEART RATE: 65 BPM | SYSTOLIC BLOOD PRESSURE: 120 MMHG | HEIGHT: 65 IN | DIASTOLIC BLOOD PRESSURE: 80 MMHG | TEMPERATURE: 98.4 F | OXYGEN SATURATION: 98 % | WEIGHT: 135 LBS | BODY MASS INDEX: 22.49 KG/M2

## 2021-03-22 DIAGNOSIS — K21.00 GASTROESOPHAGEAL REFLUX DISEASE WITH ESOPHAGITIS WITHOUT HEMORRHAGE: ICD-10-CM

## 2021-03-22 DIAGNOSIS — Z78.0 POSTMENOPAUSE: ICD-10-CM

## 2021-03-22 DIAGNOSIS — N18.30 STAGE 3 CHRONIC KIDNEY DISEASE, UNSPECIFIED WHETHER STAGE 3A OR 3B CKD (HCC): ICD-10-CM

## 2021-03-22 DIAGNOSIS — F51.01 PRIMARY INSOMNIA: ICD-10-CM

## 2021-03-22 DIAGNOSIS — E78.5 HYPERLIPIDEMIA LDL GOAL <100: ICD-10-CM

## 2021-03-22 DIAGNOSIS — I10 ESSENTIAL HYPERTENSION: Primary | ICD-10-CM

## 2021-03-22 PROCEDURE — 99214 OFFICE O/P EST MOD 30 MIN: CPT | Performed by: INTERNAL MEDICINE

## 2021-03-22 RX ORDER — ZOLPIDEM TARTRATE 10 MG/1
10 TABLET ORAL NIGHTLY PRN
Qty: 30 TABLET | Refills: 3 | Status: SHIPPED | OUTPATIENT
Start: 2021-03-22 | End: 2021-06-14

## 2021-03-22 NOTE — PROGRESS NOTES
Subjective   Deidre Gomez is a 70 y.o. female.   Chief Complaint   Patient presents with   • Hypertension   • Hyperlipidemia   • Anxiety   • Heartburn   • Insomnia       History of Present Illness   Here for f/u on chronic conditions: htn, hlp, anxiety, gerd, and insomnia. HTn controlled with Bystolic. No CP or SOA. No HA or heart palpitations. HLp has not been controlled but refuses to take a statin.  Anxiety  Has tried multiple meds and psy is seeing. Heartburn is diet controlled. Insomnia controlled with ambien.   The following portions of the patient's history were reviewed and updated as appropriate: allergies, current medications, past family history, past medical history, past social history, past surgical history and problem list.  Past Medical History:   Diagnosis Date   • Arthritis    • Hypertension    • Left breast mass 2016   • Leg numbness    • Tobacco abuse counseling      Past Surgical History:   Procedure Laterality Date   • APPENDECTOMY     • BREAST BIOPSY     • BREAST CYST ASPIRATION     • HYSTERECTOMY     • OOPHORECTOMY       Family History   Problem Relation Age of Onset   • Allergies Other    • Depression Other    • Diabetes Other    • Heart disease Other    • Hypertension Other    • Skin cancer Other    • Bleeding Disorder Other    • Stroke Other    • Cancer Mother    • Breast cancer Neg Hx         no family hx     Social History     Socioeconomic History   • Marital status:      Spouse name: Not on file   • Number of children: Not on file   • Years of education: Not on file   • Highest education level: Not on file   Tobacco Use   • Smoking status: Former Smoker     Quit date: 2/3/2019     Years since quittin.1   • Smokeless tobacco: Never Used   Substance and Sexual Activity   • Alcohol use: No   • Drug use: No   • Sexual activity: Yes     Partners: Male     Birth control/protection: Surgical     Comment: hysterectomy     Current Outpatient Medications on File Prior to  "Visit   Medication Sig Dispense Refill   • ALPRAZolam (XANAX) 0.5 MG tablet Take 1 tablet by mouth 2 (Two) Times a Day As Needed for Anxiety. 60 tablet 2   • buPROPion XL (WELLBUTRIN XL) 150 MG 24 hr tablet Take 1 tablet by mouth once daily 90 tablet 0   • BYSTOLIC 5 MG tablet Take 1 tablet by mouth once daily (Patient taking differently: 7.5 mg.) 30 tablet 3   • Diclofenac Sodium (Voltaren) 1 % gel gel Voltaren 1 % topical gel     • Ergocalciferol (VITAMIN D2 PO) Vitamin D2 1,250 mcg (50,000 unit) capsule     • [DISCONTINUED] zolpidem (AMBIEN) 10 MG tablet Take 1 tablet by mouth At Night As Needed for Sleep. for sleep 30 tablet 3     No current facility-administered medications on file prior to visit.       Review of Systems   Constitutional: Negative for activity change, appetite change, chills, diaphoresis, fatigue, fever and unexpected weight change.   HENT: Negative for congestion, ear discharge, ear pain, mouth sores, nosebleeds, sinus pressure, sneezing and sore throat.    Eyes: Negative for pain, discharge and itching.   Respiratory: Negative for cough, chest tightness, shortness of breath and wheezing.    Cardiovascular: Negative for chest pain, palpitations and leg swelling.   Gastrointestinal: Negative for abdominal pain, constipation, diarrhea, nausea and vomiting.   Endocrine: Negative for cold intolerance, heat intolerance, polydipsia and polyphagia.   Genitourinary: Negative for dysuria, flank pain, frequency, hematuria and urgency.   Musculoskeletal: Negative for arthralgias, back pain, gait problem, myalgias, neck pain and neck stiffness.   Skin: Negative for color change, pallor and rash.   Neurological: Negative for seizures, speech difficulty, numbness and headaches.   Psychiatric/Behavioral: Negative for agitation, confusion, decreased concentration and sleep disturbance. The patient is not nervous/anxious.      /80   Pulse 65   Temp 98.4 °F (36.9 °C)   Ht 163.8 cm (64.5\")   Wt 61.2 kg " (135 lb)   LMP  (LMP Unknown)   SpO2 98%   BMI 22.81 kg/m²     Objective   Physical Exam  Vitals and nursing note reviewed.   Constitutional:       Appearance: Normal appearance. She is well-developed.   HENT:      Head: Normocephalic.      Right Ear: Tympanic membrane, ear canal and external ear normal.      Left Ear: Ear canal and external ear normal.      Nose: Nose normal.      Mouth/Throat:      Pharynx: No oropharyngeal exudate or posterior oropharyngeal erythema.   Eyes:      Conjunctiva/sclera: Conjunctivae normal.      Pupils: Pupils are equal, round, and reactive to light.   Neck:      Thyroid: No thyromegaly.      Vascular: No JVD.   Cardiovascular:      Rate and Rhythm: Normal rate and regular rhythm.      Heart sounds: Normal heart sounds. No murmur heard.   No friction rub.   Pulmonary:      Effort: No respiratory distress.      Breath sounds: No wheezing or rales.   Abdominal:      General: Abdomen is flat. There is no distension.      Palpations: Abdomen is soft.      Tenderness: There is no abdominal tenderness.   Musculoskeletal:         General: No tenderness.   Lymphadenopathy:      Cervical: No cervical adenopathy.   Skin:     Findings: No rash.   Neurological:      General: No focal deficit present.      Mental Status: She is alert and oriented to person, place, and time.      Cranial Nerves: No cranial nerve deficit.      Deep Tendon Reflexes: Reflexes normal.   Psychiatric:         Mood and Affect: Mood normal.         Behavior: Behavior normal.         Assessment/Plan   Diagnoses and all orders for this visit:    1. Essential hypertension (Primary)  On Bystolic 5 mg po qd  2. Hyperlipidemia LDL goal <100  Can't tolerate statin or Zetia  3. Gastroesophageal reflux disease with esophagitis without hemorrhage  Controlled with diet  4. Stage 3 chronic kidney disease, unspecified whether stage 3a or 3b CKD (CMS/HCC)  stable  5. Primary insomnia  -     zolpidem (AMBIEN) 10 MG tablet; Take 1  tablet by mouth At Night As Needed for Sleep. for sleep  Dispense: 30 tablet; Refill: 3    6. Postmenopause  -     DEXA Bone Density Axial; Future  7. Anxiety  Using xanax prn and wellbutrin daily        The patient has read and signed the [facility] Controlled Substance Contract.  I will continue to see patient for regular follow up appointments.  They are well controlled on their medication.  SCOTT is updated every 3 months. The patient is aware of the potential for addiction and dependence.

## 2021-06-14 DIAGNOSIS — F51.01 PRIMARY INSOMNIA: ICD-10-CM

## 2021-06-14 DIAGNOSIS — F33.2 SEVERE EPISODE OF RECURRENT MAJOR DEPRESSIVE DISORDER, WITHOUT PSYCHOTIC FEATURES (HCC): ICD-10-CM

## 2021-06-14 RX ORDER — BUPROPION HYDROCHLORIDE 150 MG/1
TABLET ORAL
Qty: 90 TABLET | Refills: 0 | Status: SHIPPED | OUTPATIENT
Start: 2021-06-14 | End: 2021-07-12 | Stop reason: DRUGHIGH

## 2021-06-14 RX ORDER — ZOLPIDEM TARTRATE 10 MG/1
TABLET ORAL
Qty: 30 TABLET | Refills: 0 | Status: SHIPPED | OUTPATIENT
Start: 2021-06-14 | End: 2021-08-19 | Stop reason: SDUPTHER

## 2021-06-14 NOTE — TELEPHONE ENCOUNTER
Last Office Visit: 3/22/2021  Next Office Visit: 7/23/2021    Labs completed in past 6 months? yes  Labs completed in past year? yes    Zolpidem  Last Refill Date: 3/22/2021  Quantity: 30  Refills: 3    Bupropion XL  Last Refill Date: 2/22/2021  Quantity: 90  Refills: 0    Pharmacy: on file    Please review pended refill request for any changes needed on refills or quantities. Thank you!

## 2021-07-12 ENCOUNTER — OFFICE VISIT (OUTPATIENT)
Dept: INTERNAL MEDICINE | Facility: CLINIC | Age: 71
End: 2021-07-12

## 2021-07-12 ENCOUNTER — LAB (OUTPATIENT)
Dept: LAB | Facility: HOSPITAL | Age: 71
End: 2021-07-12

## 2021-07-12 VITALS
BODY MASS INDEX: 23.16 KG/M2 | HEART RATE: 61 BPM | OXYGEN SATURATION: 99 % | HEIGHT: 65 IN | WEIGHT: 139 LBS | DIASTOLIC BLOOD PRESSURE: 80 MMHG | SYSTOLIC BLOOD PRESSURE: 130 MMHG

## 2021-07-12 DIAGNOSIS — H91.8X3 OTHER SPECIFIED HEARING LOSS OF BOTH EARS: ICD-10-CM

## 2021-07-12 DIAGNOSIS — I10 ESSENTIAL HYPERTENSION: ICD-10-CM

## 2021-07-12 DIAGNOSIS — E55.9 VITAMIN D DEFICIENCY: ICD-10-CM

## 2021-07-12 DIAGNOSIS — G44.52 NEW DAILY PERSISTENT HEADACHE: ICD-10-CM

## 2021-07-12 DIAGNOSIS — R63.5 WEIGHT GAIN: ICD-10-CM

## 2021-07-12 DIAGNOSIS — F51.04 CHRONIC INSOMNIA: ICD-10-CM

## 2021-07-12 DIAGNOSIS — F41.9 ANXIETY: ICD-10-CM

## 2021-07-12 DIAGNOSIS — I10 ESSENTIAL HYPERTENSION: Primary | ICD-10-CM

## 2021-07-12 PROCEDURE — 99214 OFFICE O/P EST MOD 30 MIN: CPT | Performed by: INTERNAL MEDICINE

## 2021-07-12 RX ORDER — BUPROPION HYDROCHLORIDE 300 MG/1
300 TABLET ORAL DAILY
Qty: 30 TABLET | Refills: 2 | Status: SHIPPED | OUTPATIENT
Start: 2021-07-12 | End: 2021-10-20

## 2021-07-12 RX ORDER — NEBIVOLOL HYDROCHLORIDE 2.5 MG/1
2.5 TABLET ORAL
COMMUNITY
Start: 2021-06-18 | End: 2022-03-07

## 2021-07-12 NOTE — PROGRESS NOTES
Subjective   Deidre Gomez is a 70 y.o. female.   Chief Complaint   Patient presents with   • Hypertension   • Anxiety   • Insomnia       History of Present Illness   Here for f/u on chronic conditions: HTN, anxiety, and insomnia. HTN controlled with Bystolic. Anxiety is not controlled with xanax. Also, on wellbutrin.  SSRI's did not help. Insomnia controlled with ambien. No side effects. 5 mg does not help her, 10 mg does.  No CP or SOA. No heart palpitations.   Hearing loss over the last 6 months has gotten worst.   Weight gain of  Pounds over last few months. No change in diet or exercise. Vitamin d def and taking 5,000 vitamin d3.   Over last 6 weeks, new daily persistent headaches. No weakness or slurred speech.  Nothing make the headache worst or better.   The following portions of the patient's history were reviewed and updated as appropriate: allergies, current medications, past family history, past medical history, past social history, past surgical history and problem list.  Past Medical History:   Diagnosis Date   • Arthritis    • Hypertension    • Left breast mass 2016   • Leg numbness    • Tobacco abuse counseling      Past Surgical History:   Procedure Laterality Date   • APPENDECTOMY     • BREAST BIOPSY     • BREAST CYST ASPIRATION     • HYSTERECTOMY     • OOPHORECTOMY       Family History   Problem Relation Age of Onset   • Allergies Other    • Depression Other    • Diabetes Other    • Heart disease Other    • Hypertension Other    • Skin cancer Other    • Bleeding Disorder Other    • Stroke Other    • Cancer Mother    • Breast cancer Neg Hx         no family hx     Social History     Socioeconomic History   • Marital status:      Spouse name: Not on file   • Number of children: Not on file   • Years of education: Not on file   • Highest education level: Not on file   Tobacco Use   • Smoking status: Former Smoker     Quit date: 2/3/2019     Years since quittin.4   • Smokeless  tobacco: Never Used   Substance and Sexual Activity   • Alcohol use: No   • Drug use: No   • Sexual activity: Yes     Partners: Male     Birth control/protection: Surgical     Comment: hysterectomy     Current Outpatient Medications on File Prior to Visit   Medication Sig Dispense Refill   • ALPRAZolam (XANAX) 0.5 MG tablet Take 1 tablet by mouth 2 (Two) Times a Day As Needed for Anxiety. 60 tablet 2   • Bystolic 2.5 MG tablet Take 2.5 mg by mouth every night at bedtime.     • BYSTOLIC 5 MG tablet Take 1 tablet by mouth once daily (Patient taking differently: 7.5 mg.) 30 tablet 3   • Diclofenac Sodium (Voltaren) 1 % gel gel Voltaren 1 % topical gel     • Ergocalciferol (VITAMIN D2 PO) Vitamin D2 1,250 mcg (50,000 unit) capsule     • zolpidem (AMBIEN) 10 MG tablet TAKE 1 TABLET BY MOUTH AT NIGHT AS NEEDED FOR SLEEP 30 tablet 0   • [DISCONTINUED] buPROPion XL (WELLBUTRIN XL) 150 MG 24 hr tablet Take 1 tablet by mouth once daily 90 tablet 0     No current facility-administered medications on file prior to visit.       Review of Systems   Constitutional: Positive for unexpected weight change. Negative for activity change, appetite change, chills, diaphoresis, fatigue and fever.   HENT: Positive for hearing loss. Negative for congestion, ear discharge, ear pain, mouth sores, nosebleeds, sinus pressure, sneezing and sore throat.    Respiratory: Negative for cough, chest tightness, shortness of breath and wheezing.    Cardiovascular: Negative for chest pain, palpitations and leg swelling.   Gastrointestinal: Negative for abdominal pain, constipation, diarrhea, nausea and vomiting.   Genitourinary: Negative for dysuria, flank pain, frequency, hematuria and urgency.   Musculoskeletal: Negative for back pain and gait problem.   Neurological: Positive for headaches. Negative for seizures, speech difficulty and numbness.   Psychiatric/Behavioral: Positive for sleep disturbance. Negative for agitation, confusion and decreased  "concentration. The patient is nervous/anxious.      /80   Pulse 61   Ht 163.8 cm (64.5\")   Wt 63 kg (139 lb)   LMP  (LMP Unknown)   SpO2 99%   BMI 23.49 kg/m²       Objective   Physical Exam  Vitals and nursing note reviewed.   Constitutional:       Appearance: She is well-developed.   HENT:      Head: Normocephalic.      Right Ear: External ear normal.      Left Ear: External ear normal.      Nose: Nose normal.   Neck:      Thyroid: No thyromegaly.      Vascular: No JVD.   Cardiovascular:      Rate and Rhythm: Normal rate and regular rhythm.      Heart sounds: Normal heart sounds. No murmur heard.   No friction rub.   Pulmonary:      Effort: No respiratory distress.      Breath sounds: No wheezing or rales.   Abdominal:      General: There is no distension.      Tenderness: There is no abdominal tenderness.   Musculoskeletal:         General: No tenderness.   Lymphadenopathy:      Cervical: No cervical adenopathy.   Skin:     Findings: No rash.   Neurological:      General: No focal deficit present.      Mental Status: She is alert and oriented to person, place, and time.   Psychiatric:         Mood and Affect: Mood normal.         Behavior: Behavior normal.         Assessment/Plan   Diagnoses and all orders for this visit:    1. Essential hypertension (Primary)  -     Comprehensive Metabolic Panel; Future  -     CBC & Differential; Future  Continue Bystolic 7.5 mg po qd.  2. Chronic insomnia  Continue ambien 10 mg po qhs, 5mg does not work  3. Anxiety  -     buPROPion XL (Wellbutrin XL) 300 MG 24 hr tablet; Take 1 tablet by mouth Daily.  Dispense: 30 tablet; Refill: 2  Increase wellbutrin 300mg po qd and continue xanax 0.5 mg  Po bid prn anxiety  4. Weight gain  -     TSH; Future    5. New daily persistent headache  -     CT Head Without Contrast; Future    6. Other specified hearing loss of both ears  -     Ambulatory Referral to ENT (Otolaryngology)    7. Vitamin D deficiency  -     Vitamin D 25 " Hydroxy; Future  Continue vitamin d3 5,000 IU daily.        The patient has read and signed the [facility] Controlled Substance Contract.  I will continue to see patient for regular follow up appointments.  They are well controlled on their medication.  SCOTT is updated every 3 months. The patient is aware of the potential for addiction and dependence.

## 2021-07-13 LAB
25(OH)D3+25(OH)D2 SERPL-MCNC: 43.4 NG/ML (ref 30–100)
ALBUMIN SERPL-MCNC: 4.6 G/DL (ref 3.8–4.8)
ALBUMIN/GLOB SERPL: 2 {RATIO} (ref 1.2–2.2)
ALP SERPL-CCNC: 81 IU/L (ref 48–121)
ALT SERPL-CCNC: 7 IU/L (ref 0–32)
AST SERPL-CCNC: 22 IU/L (ref 0–40)
BASOPHILS # BLD AUTO: 0 X10E3/UL (ref 0–0.2)
BASOPHILS NFR BLD AUTO: 0 %
BILIRUB SERPL-MCNC: 0.2 MG/DL (ref 0–1.2)
BUN SERPL-MCNC: 21 MG/DL (ref 8–27)
BUN/CREAT SERPL: 16 (ref 12–28)
CALCIUM SERPL-MCNC: 9.6 MG/DL (ref 8.7–10.3)
CHLORIDE SERPL-SCNC: 106 MMOL/L (ref 96–106)
CO2 SERPL-SCNC: 22 MMOL/L (ref 20–29)
CREAT SERPL-MCNC: 1.29 MG/DL (ref 0.57–1)
EOSINOPHIL # BLD AUTO: 0 X10E3/UL (ref 0–0.4)
EOSINOPHIL NFR BLD AUTO: 0 %
ERYTHROCYTE [DISTWIDTH] IN BLOOD BY AUTOMATED COUNT: 14.2 % (ref 11.7–15.4)
GLOBULIN SER CALC-MCNC: 2.3 G/DL (ref 1.5–4.5)
GLUCOSE SERPL-MCNC: 99 MG/DL (ref 65–99)
HCT VFR BLD AUTO: 45.3 % (ref 34–46.6)
HGB BLD-MCNC: 15.1 G/DL (ref 11.1–15.9)
IMM GRANULOCYTES # BLD AUTO: 0 X10E3/UL (ref 0–0.1)
IMM GRANULOCYTES NFR BLD AUTO: 0 %
LYMPHOCYTES # BLD AUTO: 2.3 X10E3/UL (ref 0.7–3.1)
LYMPHOCYTES NFR BLD AUTO: 33 %
MCH RBC QN AUTO: 29.7 PG (ref 26.6–33)
MCHC RBC AUTO-ENTMCNC: 33.3 G/DL (ref 31.5–35.7)
MCV RBC AUTO: 89 FL (ref 79–97)
MONOCYTES # BLD AUTO: 0.6 X10E3/UL (ref 0.1–0.9)
MONOCYTES NFR BLD AUTO: 8 %
NEUTROPHILS # BLD AUTO: 4 X10E3/UL (ref 1.4–7)
NEUTROPHILS NFR BLD AUTO: 59 %
PLATELET # BLD AUTO: 190 X10E3/UL (ref 150–450)
POTASSIUM SERPL-SCNC: 4.9 MMOL/L (ref 3.5–5.2)
PROT SERPL-MCNC: 6.9 G/DL (ref 6–8.5)
RBC # BLD AUTO: 5.08 X10E6/UL (ref 3.77–5.28)
SODIUM SERPL-SCNC: 144 MMOL/L (ref 134–144)
TSH SERPL DL<=0.005 MIU/L-ACNC: 2.9 UIU/ML (ref 0.45–4.5)
WBC # BLD AUTO: 6.9 X10E3/UL (ref 3.4–10.8)

## 2021-07-22 ENCOUNTER — TELEPHONE (OUTPATIENT)
Dept: INTERNAL MEDICINE | Facility: CLINIC | Age: 71
End: 2021-07-22

## 2021-07-22 ENCOUNTER — OFFICE VISIT (OUTPATIENT)
Dept: INTERNAL MEDICINE | Facility: CLINIC | Age: 71
End: 2021-07-22

## 2021-07-22 VITALS
DIASTOLIC BLOOD PRESSURE: 80 MMHG | HEIGHT: 65 IN | WEIGHT: 132 LBS | SYSTOLIC BLOOD PRESSURE: 128 MMHG | BODY MASS INDEX: 21.99 KG/M2 | RESPIRATION RATE: 16 BRPM | TEMPERATURE: 97.3 F | OXYGEN SATURATION: 98 % | HEART RATE: 71 BPM

## 2021-07-22 DIAGNOSIS — M54.50 ACUTE BILATERAL LOW BACK PAIN WITHOUT SCIATICA: Primary | ICD-10-CM

## 2021-07-22 PROCEDURE — 99213 OFFICE O/P EST LOW 20 MIN: CPT | Performed by: NURSE PRACTITIONER

## 2021-07-22 RX ORDER — LIDOCAINE 50 MG/G
1 PATCH TOPICAL EVERY 24 HOURS
Qty: 6 PATCH | Refills: 1 | Status: SHIPPED | OUTPATIENT
Start: 2021-07-22 | End: 2023-03-09

## 2021-07-22 RX ORDER — CYCLOBENZAPRINE HCL 10 MG
10 TABLET ORAL 3 TIMES DAILY PRN
Qty: 20 TABLET | Refills: 0 | OUTPATIENT
Start: 2021-07-22 | End: 2022-03-31

## 2021-07-22 RX ORDER — SENNOSIDES 8.6 MG
650 CAPSULE ORAL EVERY 8 HOURS PRN
Qty: 60 TABLET | Refills: 0 | Status: SHIPPED | OUTPATIENT
Start: 2021-07-22

## 2021-07-22 NOTE — TELEPHONE ENCOUNTER
Caller: Deidre Gomez    Relationship: Self    Best call back number: [phone]    What medication are you requesting: MUSCLE RELAXER     What are your current symptoms: HURT BACK    How long have you been experiencing symptoms: 6 DAYS     Have you had these symptoms before:    [] Yes  [x] No    Have you been treated for these symptoms before:   [] Yes  [x] No    If a prescription is needed, what is your preferred pharmacy and phone number:    [facility] [phone]    Additional notes:PATIENT WAS DOING LAWN WORK AND EVER SINCE THEN HAS THROWN HER BACK OUT TO WHERE SHE CAN NOT BEND OVER AND IF SHE DOES SHE CAN NOT STRAIGHTEN UP. PATIENT HAS BEEN UNABLE TO FUNCTION

## 2021-07-22 NOTE — TELEPHONE ENCOUNTER
"Patient needs an appointment to be seen for evaluation.  Left message for patient to call office to schedule appointment.    **HUB TO READ**    \"Needs to schedule an appointment to be evaluated for back pain for RX\"  \"  "

## 2021-07-23 ENCOUNTER — PRIOR AUTHORIZATION (OUTPATIENT)
Dept: INTERNAL MEDICINE | Facility: CLINIC | Age: 71
End: 2021-07-23

## 2021-07-23 NOTE — TELEPHONE ENCOUNTER
PA denied for Lidocaine patches.  Called and informed patient and pharmacy.  Patient states she will go to ED due to severity of pain today.

## 2021-07-29 ENCOUNTER — HOSPITAL ENCOUNTER (OUTPATIENT)
Dept: CT IMAGING | Facility: HOSPITAL | Age: 71
Discharge: HOME | End: 2021-07-29
Admitting: INTERNAL MEDICINE

## 2021-07-29 DIAGNOSIS — G44.52 NEW DAILY PERSISTENT HEADACHE: ICD-10-CM

## 2021-07-29 PROCEDURE — 70450 CT HEAD/BRAIN W/O DYE: CPT

## 2021-08-02 ENCOUNTER — TELEPHONE (OUTPATIENT)
Dept: INTERNAL MEDICINE | Facility: CLINIC | Age: 71
End: 2021-08-02

## 2021-08-02 NOTE — TELEPHONE ENCOUNTER
Called patient and patient declines to come in for appointment.  Will continue to use current treatment and will call back if she decides she wants to be seen.

## 2021-08-02 NOTE — TELEPHONE ENCOUNTER
Pt was seen by dr salas on 07/12/2021 for back pain.  She then saw Lulu on 07/22/2021 for the same issue.  She said Lulu didn't help any at all and would like Dr Salas to look over her notes from that day and see if there is anything that she can do to help the patient with her on going back pain.

## 2021-08-19 DIAGNOSIS — F51.01 PRIMARY INSOMNIA: ICD-10-CM

## 2021-08-19 RX ORDER — ZOLPIDEM TARTRATE 10 MG/1
10 TABLET ORAL NIGHTLY PRN
Qty: 30 TABLET | Refills: 2 | Status: SHIPPED | OUTPATIENT
Start: 2021-08-19 | End: 2022-03-07

## 2021-08-19 NOTE — TELEPHONE ENCOUNTER
Last appointment: 07/12/21  Next appointment: 10/11/21  Raoul: 07/27/20  UDS:02/24/20  CSA: 11/30/20    Last Refill: 06/14/21  Quantity: 30  Refills: 0

## 2021-10-07 ENCOUNTER — HOSPITAL ENCOUNTER (OUTPATIENT)
Dept: BONE DENSITY | Facility: HOSPITAL | Age: 71
Discharge: HOME | End: 2021-10-07
Admitting: INTERNAL MEDICINE

## 2021-10-07 DIAGNOSIS — Z78.0 POSTMENOPAUSE: ICD-10-CM

## 2021-10-07 PROCEDURE — 77080 DXA BONE DENSITY AXIAL: CPT

## 2021-10-11 ENCOUNTER — TELEPHONE (OUTPATIENT)
Dept: INTERNAL MEDICINE | Facility: CLINIC | Age: 71
End: 2021-10-11

## 2021-10-11 NOTE — TELEPHONE ENCOUNTER
----- Message from Aspen Salas DO sent at 10/11/2021  2:40 PM EDT -----  Dexa= osteoporosis. Will she take fosamax 70 mg once a week

## 2021-10-12 ENCOUNTER — PATIENT MESSAGE (OUTPATIENT)
Dept: INTERNAL MEDICINE | Facility: CLINIC | Age: 71
End: 2021-10-12

## 2021-10-20 DIAGNOSIS — F41.9 ANXIETY: ICD-10-CM

## 2021-10-20 RX ORDER — BUPROPION HYDROCHLORIDE 300 MG/1
TABLET ORAL
Qty: 30 TABLET | Refills: 0 | Status: SHIPPED | OUTPATIENT
Start: 2021-10-20 | End: 2021-11-22

## 2021-10-25 ENCOUNTER — OFFICE VISIT (OUTPATIENT)
Dept: INTERNAL MEDICINE | Facility: CLINIC | Age: 71
End: 2021-10-25

## 2021-10-25 VITALS
SYSTOLIC BLOOD PRESSURE: 126 MMHG | WEIGHT: 139 LBS | HEART RATE: 71 BPM | DIASTOLIC BLOOD PRESSURE: 78 MMHG | BODY MASS INDEX: 23.16 KG/M2 | HEIGHT: 65 IN | OXYGEN SATURATION: 98 %

## 2021-10-25 DIAGNOSIS — F41.9 ANXIETY: ICD-10-CM

## 2021-10-25 DIAGNOSIS — E78.5 HYPERLIPIDEMIA LDL GOAL <100: ICD-10-CM

## 2021-10-25 DIAGNOSIS — F51.04 CHRONIC INSOMNIA: ICD-10-CM

## 2021-10-25 DIAGNOSIS — M54.9 CHRONIC BACK PAIN GREATER THAN 3 MONTHS DURATION: ICD-10-CM

## 2021-10-25 DIAGNOSIS — I10 ESSENTIAL HYPERTENSION: Primary | ICD-10-CM

## 2021-10-25 DIAGNOSIS — G89.29 CHRONIC BACK PAIN GREATER THAN 3 MONTHS DURATION: ICD-10-CM

## 2021-10-25 PROCEDURE — 99214 OFFICE O/P EST MOD 30 MIN: CPT | Performed by: INTERNAL MEDICINE

## 2021-11-19 ENCOUNTER — LAB (OUTPATIENT)
Dept: LAB | Facility: HOSPITAL | Age: 71
End: 2021-11-19

## 2021-11-19 DIAGNOSIS — I10 ESSENTIAL HYPERTENSION: ICD-10-CM

## 2021-11-19 DIAGNOSIS — E78.5 HYPERLIPIDEMIA LDL GOAL <100: ICD-10-CM

## 2021-11-20 LAB
ALBUMIN SERPL-MCNC: 4.9 G/DL (ref 3.7–4.7)
ALBUMIN/GLOB SERPL: 3.1 {RATIO} (ref 1.2–2.2)
ALP SERPL-CCNC: 88 IU/L (ref 44–121)
ALT SERPL-CCNC: 9 IU/L (ref 0–32)
AST SERPL-CCNC: 15 IU/L (ref 0–40)
BILIRUB SERPL-MCNC: 0.3 MG/DL (ref 0–1.2)
BUN SERPL-MCNC: 23 MG/DL (ref 8–27)
BUN/CREAT SERPL: 21 (ref 12–28)
CALCIUM SERPL-MCNC: 9.3 MG/DL (ref 8.7–10.3)
CHLORIDE SERPL-SCNC: 103 MMOL/L (ref 96–106)
CHOLEST SERPL-MCNC: 213 MG/DL (ref 100–199)
CO2 SERPL-SCNC: 22 MMOL/L (ref 20–29)
CREAT SERPL-MCNC: 1.1 MG/DL (ref 0.57–1)
GLOBULIN SER CALC-MCNC: 1.6 G/DL (ref 1.5–4.5)
GLUCOSE SERPL-MCNC: 98 MG/DL (ref 65–99)
HDLC SERPL-MCNC: 82 MG/DL
LDLC SERPL CALC-MCNC: 98 MG/DL (ref 0–99)
POTASSIUM SERPL-SCNC: 4.8 MMOL/L (ref 3.5–5.2)
PROT SERPL-MCNC: 6.5 G/DL (ref 6–8.5)
SODIUM SERPL-SCNC: 139 MMOL/L (ref 134–144)
TRIGL SERPL-MCNC: 196 MG/DL (ref 0–149)
VLDLC SERPL CALC-MCNC: 33 MG/DL (ref 5–40)

## 2021-11-22 DIAGNOSIS — F41.9 ANXIETY: ICD-10-CM

## 2021-11-22 RX ORDER — BUPROPION HYDROCHLORIDE 300 MG/1
TABLET ORAL
Qty: 30 TABLET | Refills: 0 | Status: SHIPPED | OUTPATIENT
Start: 2021-11-22 | End: 2021-12-29

## 2021-12-29 DIAGNOSIS — F41.9 ANXIETY: ICD-10-CM

## 2021-12-29 RX ORDER — BUPROPION HYDROCHLORIDE 300 MG/1
TABLET ORAL
Qty: 90 TABLET | Refills: 1 | Status: SHIPPED | OUTPATIENT
Start: 2021-12-29 | End: 2022-03-22 | Stop reason: SDUPTHER

## 2022-02-10 ENCOUNTER — LAB (OUTPATIENT)
Dept: LAB | Facility: HOSPITAL | Age: 72
End: 2022-02-10

## 2022-02-10 ENCOUNTER — TELEPHONE (OUTPATIENT)
Dept: INTERNAL MEDICINE | Facility: CLINIC | Age: 72
End: 2022-02-10

## 2022-02-10 DIAGNOSIS — F41.9 ANXIETY: ICD-10-CM

## 2022-02-10 RX ORDER — ALPRAZOLAM 0.5 MG/1
0.5 TABLET ORAL 2 TIMES DAILY PRN
Qty: 60 TABLET | Refills: 2 | Status: SHIPPED | OUTPATIENT
Start: 2022-02-10 | End: 2022-02-10 | Stop reason: SDUPTHER

## 2022-02-10 RX ORDER — ALPRAZOLAM 0.5 MG/1
0.5 TABLET ORAL 2 TIMES DAILY PRN
Qty: 60 TABLET | Refills: 2 | Status: SHIPPED | OUTPATIENT
Start: 2022-02-10 | End: 2022-10-12 | Stop reason: SDUPTHER

## 2022-02-10 NOTE — TELEPHONE ENCOUNTER
Last Office Visit: 10/25/21  Next Office Visit:03/07/22    Labs completed in past 6 months? yes  Labs completed in past year? yes    Last Refill Date:11/30/20  Quantity:60  Refills:3    Pharmacy:     Please review pended refill request for any changes needed on refills or quantities. Thank you!

## 2022-02-10 NOTE — TELEPHONE ENCOUNTER
Caller: Deidre Gomez    Relationship to patient: Self    Best call back number:[phone]      What is the call regarding:  PATIENT IS WANTING TO MAKE SURE THAT DR GTZ RECEIVED THE LAB ORDER FROM DR SUYAPA VAZQUEZ, HER NEPHROLOGIST SO SHE COULD COME IN AND DO HER LABS IN THE OFFICE.

## 2022-02-10 NOTE — TELEPHONE ENCOUNTER
Incoming Refill Request      Medication requested (name and dose): ALPRAZolam (XANAX) 0.5 MG tablet    Pharm[facility] where [facility] should be sent: [facility]    Additional details provided by patient: PATIENT IS OUT OF THE MEDICATION    Best call back number: [phone]    Does the patient have less than a 3 day supply:  [x] Yes  [] No    Sheila Best Rep  02/10/22, 09:15 EST

## 2022-03-07 ENCOUNTER — OFFICE VISIT (OUTPATIENT)
Dept: INTERNAL MEDICINE | Facility: CLINIC | Age: 72
End: 2022-03-07

## 2022-03-07 VITALS
OXYGEN SATURATION: 100 % | DIASTOLIC BLOOD PRESSURE: 82 MMHG | SYSTOLIC BLOOD PRESSURE: 149 MMHG | BODY MASS INDEX: 23.32 KG/M2 | HEIGHT: 65 IN | WEIGHT: 140 LBS | HEART RATE: 64 BPM

## 2022-03-07 DIAGNOSIS — F33.2 SEVERE EPISODE OF RECURRENT MAJOR DEPRESSIVE DISORDER, WITHOUT PSYCHOTIC FEATURES: ICD-10-CM

## 2022-03-07 DIAGNOSIS — F31.9 BIPOLAR 1 DISORDER: ICD-10-CM

## 2022-03-07 DIAGNOSIS — F51.04 PSYCHOPHYSIOLOGICAL INSOMNIA: ICD-10-CM

## 2022-03-07 DIAGNOSIS — E04.2 MULTINODULAR THYROID: ICD-10-CM

## 2022-03-07 DIAGNOSIS — E78.5 HYPERLIPIDEMIA LDL GOAL <100: ICD-10-CM

## 2022-03-07 DIAGNOSIS — I10 ESSENTIAL HYPERTENSION: ICD-10-CM

## 2022-03-07 DIAGNOSIS — Z13.29 THYROID DISORDER SCREEN: ICD-10-CM

## 2022-03-07 DIAGNOSIS — F51.04 CHRONIC INSOMNIA: ICD-10-CM

## 2022-03-07 DIAGNOSIS — R73.9 HYPERGLYCEMIA: ICD-10-CM

## 2022-03-07 DIAGNOSIS — Z00.00 MEDICARE ANNUAL WELLNESS VISIT, SUBSEQUENT: Primary | ICD-10-CM

## 2022-03-07 DIAGNOSIS — N18.30 STAGE 3 CHRONIC KIDNEY DISEASE, UNSPECIFIED WHETHER STAGE 3A OR 3B CKD: ICD-10-CM

## 2022-03-07 PROCEDURE — 1170F FXNL STATUS ASSESSED: CPT | Performed by: INTERNAL MEDICINE

## 2022-03-07 PROCEDURE — 1126F AMNT PAIN NOTED NONE PRSNT: CPT | Performed by: INTERNAL MEDICINE

## 2022-03-07 PROCEDURE — G0439 PPPS, SUBSEQ VISIT: HCPCS | Performed by: INTERNAL MEDICINE

## 2022-03-07 PROCEDURE — 99214 OFFICE O/P EST MOD 30 MIN: CPT | Performed by: INTERNAL MEDICINE

## 2022-03-07 PROCEDURE — 96160 PT-FOCUSED HLTH RISK ASSMT: CPT | Performed by: INTERNAL MEDICINE

## 2022-03-07 PROCEDURE — 1160F RVW MEDS BY RX/DR IN RCRD: CPT | Performed by: INTERNAL MEDICINE

## 2022-03-07 RX ORDER — NEBIVOLOL HYDROCHLORIDE 10 MG/1
10 TABLET ORAL DAILY
Qty: 30 TABLET | Refills: 1 | Status: SHIPPED | OUTPATIENT
Start: 2022-03-07 | End: 2022-03-17 | Stop reason: SDUPTHER

## 2022-03-07 NOTE — PROGRESS NOTES
The ABCs of the Annual Wellness Visit  Subsequent Medicare Wellness Visit    Chief Complaint   Patient presents with   • Medicare Wellness-subsequent   • Hypertension   • Hyperlipidemia   • Chronic Kidney Disease   • Insomnia   • Depression      Subjective    History of Present Illness:  Deidre Gomez is a 71 y.o. female who presents for a Subsequent Medicare Wellness Visit.    The following portions of the patient's history were reviewed and   updated as appropriate: allergies, current medications, past family history, past medical history, past social history, past surgical history and problem list.    Compared to one year ago, the patient feels her physical   health is worse.    Compared to one year ago, the patient feels her mental   health is the same.    Recent Hospitalizations:  She was not admitted to the hospital during the last year.       Current Medical Providers:  Patient Care Team:  Aspen Salas DO as PCP - General  Aspen Salas DO as PCP - Family Medicine    Outpatient Medications Prior to Visit   Medication Sig Dispense Refill   • acetaminophen (Tylenol 8 Hour) 650 MG 8 hr tablet Take 1 tablet by mouth Every 8 (Eight) Hours As Needed for Mild Pain . 60 tablet 0   • ALPRAZolam (XANAX) 0.5 MG tablet Take 1 tablet by mouth 2 (Two) Times a Day As Needed for Anxiety. 60 tablet 2   • buPROPion XL (WELLBUTRIN XL) 300 MG 24 hr tablet Take 1 tablet by mouth once daily 90 tablet 1   • cyclobenzaprine (FLEXERIL) 10 MG tablet Take 1 tablet by mouth 3 (Three) Times a Day As Needed for Muscle Spasms. 20 tablet 0   • Diclofenac Sodium (VOLTAREN) 1 % gel gel Voltaren 1 % topical gel     • Ergocalciferol (VITAMIN D2 PO) Vitamin D2 1,250 mcg (50,000 unit) capsule     • fluticasone (FLONASE) 50 MCG/ACT nasal spray      • lidocaine (LIDODERM) 5 % Place 1 patch on the skin as directed by provider Daily. Remove & Discard patch within 12 hours or as directed by MD 6 patch 1   • multivitamin  (THERAGRAN) tablet tablet Multi Vitamin     • predniSONE (DELTASONE) 10 MG tablet      • tiZANidine (ZANAFLEX) 2 MG tablet tizanidine 2 mg tablet     • Biotin 10 MG capsule biotin     • brompheniramine-pseudoephedrine-DM 30-2-10 MG/5ML syrup      • Bystolic 2.5 MG tablet Take 2.5 mg by mouth every night at bedtime.     • BYSTOLIC 5 MG tablet Take 1 tablet by mouth once daily (Patient taking differently: 7.5 mg.) 30 tablet 3   • Cholecalciferol (Vitamin D3) 1.25 MG (06785 UT) capsule Vitamin D3     • cloNIDine (CATAPRES) 0.1 MG tablet clonidine HCl 0.1 mg tablet     • zolpidem (AMBIEN) 10 MG tablet Take 1 tablet by mouth At Night As Needed for Sleep. 30 tablet 2     No facility-administered medications prior to visit.       No opioid medication identified on active medication list. I have reviewed chart for other potential  high risk medication/s and harmful drug interactions in the elderly.          Aspirin is not on active medication list.  Aspirin use is not indicated based on review of current medical condition/s. Risk of harm outweighs potential benefits.  .    Patient Active Problem List   Diagnosis   • Gastroesophageal reflux disease   • Hyperlipidemia LDL goal <100   • Essential hypertension   • Chronic insomnia   • Allergic rhinitis   • Anxiety   • Bipolar 1 disorder (HCC)   • Multinodular thyroid   • Age-related osteoporosis without current pathological fracture   • Restless legs syndrome (RLS)   • Suspected sleep apnea   • Breast calcification seen on mammogram   • Bilateral carpal tunnel syndrome   • Severe episode of recurrent major depressive disorder, without psychotic features (HCC)   • Chronic kidney disease, stage 3 (HCC)     Advance Care Planning  Advance Directive is not on file.  ACP discussion was held with the patient during this visit. Patient does not have an advance directive, information provided.    Review of Systems   Constitutional: Negative for chills and fever.   Respiratory: Negative  "for cough and shortness of breath.    Cardiovascular: Negative for chest pain and leg swelling.   Gastrointestinal: Negative for diarrhea, nausea and vomiting.   Musculoskeletal: Negative for gait problem and neck pain.   Neurological: Negative for weakness and confusion.   Psychiatric/Behavioral: Positive for sleep disturbance. Negative for hallucinations. The patient is nervous/anxious.         Objective    Vitals:    03/07/22 1546 03/07/22 1602   BP: 156/88 149/82   Pulse: 64    SpO2: 100%    Weight: 63.5 kg (140 lb)    Height: 163.8 cm (64.5\")    PainSc: 0-No pain      BMI Readings from Last 1 Encounters:   03/07/22 23.66 kg/m²   BMI is within normal parameters. No follow-up required.    Does the patient have evidence of cognitive impairment? No    Physical Exam  Vitals reviewed.   Cardiovascular:      Rate and Rhythm: Normal rate and regular rhythm.   Pulmonary:      Effort: No respiratory distress.      Breath sounds: No wheezing or rales.   Neurological:      General: No focal deficit present.      Mental Status: She is alert and oriented to person, place, and time.   Psychiatric:         Mood and Affect: Mood normal.         Behavior: Behavior normal.                 HEALTH RISK ASSESSMENT    Smoking Status:  Social History     Tobacco Use   Smoking Status Former Smoker   • Quit date: 2/3/2019   • Years since quitting: 3.1   Smokeless Tobacco Never Used     Alcohol Consumption:  Social History     Substance and Sexual Activity   Alcohol Use No     Fall Risk Screen:    STEADI Fall Risk Assessment was completed, and patient is at LOW risk for falls.Assessment completed on:3/7/2022    Depression Screening:  PHQ-2/PHQ-9 Depression Screening 3/7/2022   Retired Total Score -   Little Interest or Pleasure in Doing Things 3-->nearly every day   Feeling Down, Depressed or Hopeless 3-->nearly every day   Trouble Falling or Staying Asleep, or Sleeping Too Much 3-->nearly every day   Feeling Tired or Having Little " Energy 3-->nearly every day   Poor Appetite or Overeating 1-->several days   Feeling Bad about Yourself - or that You are a Failure or Have Let Yourself or Your Family Down 0-->not at all   Trouble Concentrating on Things, Such as Reading the Newspaper or Watching Television 2-->more than half the days   Moving or Speaking So Slowly that Other People Could Have Noticed? Or the Opposite - Being So Fidgety 1-->several days   Thoughts that You Would be Better Off Dead or of Hurting Yourself in Some Way 0-->not at all   PHQ-9: Brief Depression Severity Measure Score 16   If You Checked Off Any Problems, How Difficult Have These Problems Made It For You to Do Your Work, Take Care of Things at Home, or Get Along with Other People? somewhat difficult       Health Habits and Functional and Cognitive Screening:  Functional & Cognitive Status 3/7/2022   Do you have difficulty preparing food and eating? No   Do you have difficulty bathing yourself, getting dressed or grooming yourself? No   Do you have difficulty using the toilet? No   Do you have difficulty moving around from place to place? No   Do you have trouble with steps or getting out of a bed or a chair? No   Current Diet Well Balanced Diet   Dental Exam Up to date   Eye Exam Up to date   Exercise (times per week) 5 times per week   Current Exercises Include Walking   Current Exercise Activities Include -   Do you need help using the phone?  No   Are you deaf or do you have serious difficulty hearing?  Yes   Do you need help with transportation? No   Do you need help shopping? No   Do you need help preparing meals?  No   Do you need help with housework?  No   Do you need help with laundry? No   Do you need help taking your medications? No   Do you need help managing money? No   Do you ever drive or ride in a car without wearing a seat belt? No   Have you felt unusual stress, anger or loneliness in the last month? Yes   Who do you live with? Alone   If you need help,  do you have trouble finding someone available to you? Yes   Have you been bothered in the last four weeks by sexual problems? No   Do you have difficulty concentrating, remembering or making decisions? Yes       Age-appropriate Screening Schedule:  Refer to the list below for future screening recommendations based on patient's age, sex and/or medical conditions. Orders for these recommended tests are listed in the plan section. The patient has been provided with a written plan.    Health Maintenance   Topic Date Due   • HEMOGLOBIN A1C  07/27/2021   • TDAP/TD VACCINES (1 - Tdap) 03/24/2022 (Originally 7/22/1969)   • INFLUENZA VACCINE  03/07/2023 (Originally 8/1/2021)   • ZOSTER VACCINE (1 of 2) 03/07/2023 (Originally 7/22/2000)   • LIPID PANEL  11/19/2022   • DXA SCAN  10/07/2023   • DIABETIC FOOT EXAM  Discontinued   • MAMMOGRAM  Discontinued   • PAP SMEAR  Discontinued   • DIABETIC EYE EXAM  Discontinued   • URINE MICROALBUMIN  Discontinued              Assessment/Plan   CMS Preventative Services Quick Reference  Risk Factors Identified During Encounter  Depression/Dysphoria  insomnia  The above risks/problems have been discussed with the patient.  Follow up actions/plans if indicated are seen below in the Assessment/Plan Section.  Pertinent information has been shared with the patient in the After Visit Summary.    Diagnoses and all orders for this visit:    1. Medicare annual wellness visit, subsequent (Primary)  Done today  2. Hyperlipidemia LDL goal <100  -     Lipid Panel; Future  Does not tolerate sttins  3. Essential hypertension  Can only take Brand name Bystolic was increased yesterday from 7.5 mg po qd to 10 mg po qd. Has clonidine to use prn for SBP >150  4. Multinodular thyroid  stable  5. Stage 3 chronic kidney disease, unspecified whether stage 3a or 3b CKD (HCC)  control BP and continue to follow with Nephrology  6. Severe episode of recurrent major depressive disorder, without psychotic features  (HCC)  Continue Wellbutrin  mg po qd  7. Bipolar 1 disorder (HCC)  Does not want to see psy anymore  8. Chronic insomnia  Ambien not helping. Trazodone in past did not work  9. Thyroid disorder screen  -     TSH; Future    10. Hyperglycemia  -     Hemoglobin A1c; Future  Low carb diet  11. Anxiety  Continue xanax 0.5 mg po bid. SSRI's did not work    Follow Up:   Return in about 3 months (around 6/7/2022).     An After Visit Summary and PPPS were made available to the patient.

## 2022-03-08 ENCOUNTER — PRIOR AUTHORIZATION (OUTPATIENT)
Dept: INTERNAL MEDICINE | Facility: CLINIC | Age: 72
End: 2022-03-08

## 2022-03-08 RX ORDER — ESZOPICLONE 2 MG/1
2 TABLET, FILM COATED ORAL NIGHTLY
Qty: 30 TABLET | Refills: 1 | Status: SHIPPED | OUTPATIENT
Start: 2022-03-08 | End: 2022-05-27 | Stop reason: SDUPTHER

## 2022-03-15 ENCOUNTER — PRIOR AUTHORIZATION (OUTPATIENT)
Dept: INTERNAL MEDICINE | Facility: CLINIC | Age: 72
End: 2022-03-15

## 2022-03-15 ENCOUNTER — TELEPHONE (OUTPATIENT)
Dept: INTERNAL MEDICINE | Facility: CLINIC | Age: 72
End: 2022-03-15

## 2022-03-15 NOTE — TELEPHONE ENCOUNTER
Bystolic not approved through insurance.  The following meds are covered:    Metoprolol Succinate ER  Bisoprolol Fumarate  Carvedilol  Metoprolol Tartrate  Atenolol

## 2022-03-17 ENCOUNTER — TELEPHONE (OUTPATIENT)
Dept: INTERNAL MEDICINE | Facility: CLINIC | Age: 72
End: 2022-03-17

## 2022-03-17 RX ORDER — NEBIVOLOL HYDROCHLORIDE 10 MG/1
10 TABLET ORAL DAILY
Qty: 30 TABLET | Refills: 1 | Status: SHIPPED | OUTPATIENT
Start: 2022-03-17 | End: 2022-06-20

## 2022-03-17 NOTE — TELEPHONE ENCOUNTER
Caller: Deidre Gomez    Relationship to patient: Self    Best call back number: [phone]    Patient is needing: PATIENT STATED THAT PHARMACY WILL NOT ACCEPT DOSAGE ON Bystolic 10 MG tablet MEDICATION BECAUSE OF ERROR AND WOULD LIKE TO KNOW WHAT IS GOING ON WITH MEDICATION AND IF PROVIDER SENT TO PHARMACY YET    PLEASE ADVISE

## 2022-03-22 DIAGNOSIS — F41.9 ANXIETY: ICD-10-CM

## 2022-03-22 RX ORDER — BUPROPION HYDROCHLORIDE 300 MG/1
300 TABLET ORAL DAILY
Qty: 90 TABLET | Refills: 1 | Status: SHIPPED | OUTPATIENT
Start: 2022-03-22 | End: 2022-03-31 | Stop reason: SDUPTHER

## 2022-03-31 DIAGNOSIS — F41.9 ANXIETY: ICD-10-CM

## 2022-03-31 RX ORDER — BUPROPION HYDROCHLORIDE 300 MG/1
300 TABLET ORAL DAILY
Qty: 90 TABLET | Refills: 1 | Status: SHIPPED | OUTPATIENT
Start: 2022-03-31 | End: 2022-08-24

## 2022-05-27 DIAGNOSIS — F51.04 PSYCHOPHYSIOLOGICAL INSOMNIA: ICD-10-CM

## 2022-05-27 NOTE — TELEPHONE ENCOUNTER
Last appointment: 03/07/2022  Next appointment: 06/15/2022      Last Refill: 03/08/2022  Quantity: 30  Refills: 1

## 2022-05-28 RX ORDER — ESZOPICLONE 2 MG/1
2 TABLET, FILM COATED ORAL NIGHTLY
Qty: 30 TABLET | Refills: 1 | Status: SHIPPED | OUTPATIENT
Start: 2022-05-28 | End: 2022-08-03 | Stop reason: SDUPTHER

## 2022-06-13 ENCOUNTER — LAB (OUTPATIENT)
Dept: LAB | Facility: HOSPITAL | Age: 72
End: 2022-06-13

## 2022-06-13 DIAGNOSIS — E78.5 HYPERLIPIDEMIA LDL GOAL <100: ICD-10-CM

## 2022-06-13 DIAGNOSIS — Z13.29 THYROID DISORDER SCREEN: ICD-10-CM

## 2022-06-13 DIAGNOSIS — R73.9 HYPERGLYCEMIA: ICD-10-CM

## 2022-06-14 LAB
CHOLEST SERPL-MCNC: 244 MG/DL (ref 100–199)
HBA1C MFR BLD: 5.7 % (ref 4.8–5.6)
HDLC SERPL-MCNC: 80 MG/DL
LDLC SERPL CALC-MCNC: 133 MG/DL (ref 0–99)
TRIGL SERPL-MCNC: 179 MG/DL (ref 0–149)
TSH SERPL DL<=0.005 MIU/L-ACNC: 2.36 UIU/ML (ref 0.45–4.5)
VLDLC SERPL CALC-MCNC: 31 MG/DL (ref 5–40)

## 2022-06-15 ENCOUNTER — LAB (OUTPATIENT)
Dept: LAB | Facility: HOSPITAL | Age: 72
End: 2022-06-15

## 2022-06-15 ENCOUNTER — OFFICE VISIT (OUTPATIENT)
Dept: INTERNAL MEDICINE | Facility: CLINIC | Age: 72
End: 2022-06-15

## 2022-06-15 VITALS
OXYGEN SATURATION: 96 % | HEART RATE: 61 BPM | SYSTOLIC BLOOD PRESSURE: 139 MMHG | HEIGHT: 64 IN | WEIGHT: 138 LBS | BODY MASS INDEX: 23.56 KG/M2 | TEMPERATURE: 97.8 F | DIASTOLIC BLOOD PRESSURE: 83 MMHG

## 2022-06-15 DIAGNOSIS — F32.A ANXIETY AND DEPRESSION: ICD-10-CM

## 2022-06-15 DIAGNOSIS — F41.9 ANXIETY AND DEPRESSION: ICD-10-CM

## 2022-06-15 DIAGNOSIS — F51.04 CHRONIC INSOMNIA: ICD-10-CM

## 2022-06-15 DIAGNOSIS — I10 UNCONTROLLED HYPERTENSION: ICD-10-CM

## 2022-06-15 DIAGNOSIS — E78.5 HYPERLIPIDEMIA LDL GOAL <100: Primary | ICD-10-CM

## 2022-06-15 DIAGNOSIS — E55.9 VITAMIN D DEFICIENCY: ICD-10-CM

## 2022-06-15 PROCEDURE — 99214 OFFICE O/P EST MOD 30 MIN: CPT | Performed by: INTERNAL MEDICINE

## 2022-06-15 RX ORDER — ESZOPICLONE 2 MG/1
TABLET, FILM COATED ORAL
COMMUNITY
Start: 2022-05-05 | End: 2023-03-09 | Stop reason: SDUPTHER

## 2022-06-15 RX ORDER — HYDROCODONE BITARTRATE AND ACETAMINOPHEN 7.5; 325 MG/1; MG/1
TABLET ORAL
COMMUNITY
Start: 2022-05-26

## 2022-06-15 RX ORDER — TIZANIDINE 2 MG/1
TABLET ORAL
COMMUNITY
Start: 2022-05-23 | End: 2023-03-09

## 2022-06-15 NOTE — PROGRESS NOTES
Subjective   Deidre Gomez is a 71 y.o. female.   Chief Complaint   Patient presents with   • Hypertension   • Hyperlipidemia   • Insomnia   • Anxiety       History of Present Illness   Here for f/u on chronic conditions: HTN, HLP, insomnia, and anxiety. HTN not controlled with Bystolic.  She cant take ARB's/ ACE . Reports side effects with almost all BP meds tried. Clonidine does not work, she stopped it on her own. Diuretics did not help. She stops medication on her own. No CP or SOA. No HA. BP fluctuates from upper 130-180's/ . Insomnia, reports Lunesta and Ambien did not help. Saw sleep Doctor. HLP and refuses statins. Anxiety and depression is better with Wellbutrin.   The following portions of the patient's history were reviewed and updated as appropriate: allergies, current medications, past family history, past medical history, past social history, past surgical history and problem list.  .  Past Medical History:   Diagnosis Date   • ADHD (attention deficit hyperactivity disorder) 1990’s    Prescribed drug   • Allergic Since childhood    Pills, cortisone,inhaler,  nose drop,   • Anemia In my 40’s   • Anxiety Always    Pills , antidepressants, anti anxiety   • Arthritis    • Cataract In my mid 50's   • Depression Always    Drugs   • Headache Since childhood    Excedrin   • HL (hearing loss) Since my 50’s   • Hyperlipidemia    • Hypertension    • Left breast mass 09/14/2016   • Leg numbness    • Low back pain In my 60's    spinal & drugs   • Renal insufficiency    • Scoliosis 70 yrs old   • Tobacco abuse counseling    • Visual impairment In my 40's    glasses     Past Surgical History:   Procedure Laterality Date   • APPENDECTOMY     • BREAST BIOPSY     • BREAST CYST ASPIRATION     • COLONOSCOPY  2017   • COSMETIC SURGERY  50 yrs old   • HYSTERECTOMY     • OOPHORECTOMY     • TUBAL ABDOMINAL LIGATION  35 yrs old     Family History   Problem Relation Age of Onset   • Allergies Other    • Depression  Other    • Diabetes Other    • Heart disease Other    • Hypertension Other    • Skin cancer Other    • Bleeding Disorder Other    • Stroke Other    • Cancer Mother         multiple myeloma   • Anxiety disorder Mother    • Depression Mother    • Early death Mother         63 yrs old   • Mental illness Mother         Depression   • Alcohol abuse Father    • Diabetes Father    • Heart disease Father    • Hyperlipidemia Father    • Stroke Father    • Vision loss Father    • Arthritis Paternal Grandmother    • Cancer Brother         melanoma   • Depression Brother    • Early death Brother         63 yrs old   • Hyperlipidemia Brother    • Hyperlipidemia Sister    • Kidney disease Sister    • Breast cancer Neg Hx         no family hx     Social History     Socioeconomic History   • Marital status:    Tobacco Use   • Smoking status: Former Smoker     Packs/day: 1.00     Years: 50.00     Pack years: 50.00     Types: Cigarettes     Start date: 1/1/1965     Quit date: 2/2/2019     Years since quitting: 3.3   • Smokeless tobacco: Never Used   Vaping Use   • Vaping Use: Never used   Substance and Sexual Activity   • Alcohol use: Never   • Drug use: Never   • Sexual activity: Not Currently     Partners: Male     Birth control/protection: Surgical     Comment: Was on birth control pill for about 15 yrs     Current Outpatient Medications on File Prior to Visit   Medication Sig Dispense Refill   • acetaminophen (Tylenol 8 Hour) 650 MG 8 hr tablet Take 1 tablet by mouth Every 8 (Eight) Hours As Needed for Mild Pain . 60 tablet 0   • ALPRAZolam (XANAX) 0.5 MG tablet Take 1 tablet by mouth 2 (Two) Times a Day As Needed for Anxiety. 60 tablet 2   • azithromycin (ZITHROMAX) 250 MG tablet Take 2 tablets the first day, then 1 tablet daily for 4 days. 6 tablet 0   • buPROPion XL (WELLBUTRIN XL) 300 MG 24 hr tablet Take 1 tablet by mouth Daily. 90 tablet 1   • Bystolic 10 MG tablet Take 1 tablet by mouth Daily. 30 tablet 1   •  "Diclofenac Sodium (VOLTAREN) 1 % gel gel Voltaren 1 % topical gel     • Ergocalciferol (VITAMIN D2 PO) Vitamin D2 1,250 mcg (50,000 unit) capsule     • eszopiclone (LUNESTA) 2 MG tablet Take 1 tablet by mouth Every Night. Take immediately before bedtime 30 tablet 1   • eszopiclone (LUNESTA) 2 MG tablet      • HYDROcodone-acetaminophen (NORCO) 7.5-325 MG per tablet      • lidocaine (LIDODERM) 5 % Place 1 patch on the skin as directed by provider Daily. Remove & Discard patch within 12 hours or as directed by MD 6 patch 1   • methylPREDNISolone (MEDROL) 4 MG dose pack Take as directed on package instructions. 21 each 0   • multivitamin (THERAGRAN) tablet tablet Multi Vitamin     • tiZANidine (ZANAFLEX) 2 MG tablet        No current facility-administered medications on file prior to visit.       Review of Systems   Constitutional: Negative for fever.   Respiratory: Negative for cough and shortness of breath.    Cardiovascular: Negative for chest pain and leg swelling.   Gastrointestinal: Negative for nausea and vomiting.   Psychiatric/Behavioral: Positive for sleep disturbance. The patient is nervous/anxious.         Depression   /83   Pulse 61   Temp 97.8 °F (36.6 °C)   Ht 163.8 cm (64.49\")   Wt 62.6 kg (138 lb)   LMP  (LMP Unknown)   SpO2 96%   BMI 23.33 kg/m²       Objective   Physical Exam  Vitals reviewed.   Cardiovascular:      Rate and Rhythm: Normal rate and regular rhythm.      Heart sounds: No murmur heard.  Pulmonary:      Effort: No respiratory distress.      Breath sounds: No wheezing.   Neurological:      General: No focal deficit present.      Mental Status: She is alert and oriented to person, place, and time.   Psychiatric:         Mood and Affect: Mood normal.         Behavior: Behavior normal.         Assessment & Plan   Diagnoses and all orders for this visit:    1. Hyperlipidemia LDL goal <100 (Primary)  Will not take statins  2. Essential hypertension  Refer to cardiology. Has not " tolerated ACE/ ARB/Diuretics. Clonidine does not help. Bystolic not helping.   3. Anxiety and depression  Continue Wellbutrin  mg po qd    4. Chronic insomnia  Not taking Lunesta. Ambien did not help. Evaluated by sleep center.

## 2022-06-16 LAB — 25(OH)D3+25(OH)D2 SERPL-MCNC: 55.7 NG/ML (ref 30–100)

## 2022-06-20 RX ORDER — NEBIVOLOL HYDROCHLORIDE 10 MG/1
TABLET ORAL
Qty: 60 TABLET | Refills: 2 | Status: SHIPPED | OUTPATIENT
Start: 2022-06-20 | End: 2022-10-12 | Stop reason: SDUPTHER

## 2022-07-03 NOTE — PROGRESS NOTES
Subjective:     Encounter Date:07/05/2022    Patient ID: Deidre Gomez is a 71 y.o.  white female from [place], [place], retired teacher at [employer].    REFERRING PHYSICIAN: Aspen Salas DO  NEPHROLOGIST: Constance Looney MD    Chief Complaint:   Chief Complaint   Patient presents with   • Hypertension     Problem List:  1. Hypertension  a. Intolerant to olmesartan, reports side effects with almost all blood pressure medications attempted, clonidine ineffective, says diuretics did not help  b. Apparently acceptable renal artery duplex with her nephrologist in 2021-  c. Residual class I symptoms with normal ECG July 2022  2. Hyperlipidemia; declined statins, ASCVD 10-year risk is 16.4%, 7.7% if treated  3. Anxiety and depression  4. Chronic kidney disease stage III with apparent abnormal acceptable renal ultrasound in 2021- data deficit  5. Systolic heart murmur  6. Lumbar back pain  7. ADHD  8. RLS  9. Osteoporosis  10. At risk for sleep apnea/chronic insomnia with remote sleep study-data deficit is  11. Former tobacco use; 1 pack/day x 50 years, quit in 2019  12. History of TIA x2  13. Surgical history:  a. Appendectomy  b. Breast biopsy/cyst aspiration  c. Cosmetic surgery  d. Hysterectomy  e. Tubal ligation    Allergies   Allergen Reactions   • Codeine Nausea Only   • Olmesartan    • Cephalexin Rash   • Penicillins Rash         Current Outpatient Medications:   •  acetaminophen (Tylenol 8 Hour) 650 MG 8 hr tablet, Take 1 tablet by mouth Every 8 (Eight) Hours As Needed for Mild Pain ., Disp: 60 tablet, Rfl: 0  •  ALPRAZolam (XANAX) 0.5 MG tablet, Take 1 tablet by mouth 2 (Two) Times a Day As Needed for Anxiety., Disp: 60 tablet, Rfl: 2  •  buPROPion XL (WELLBUTRIN XL) 300 MG 24 hr tablet, Take 1 tablet by mouth Daily., Disp: 90 tablet, Rfl: 1  •  Bystolic 10 MG tablet, Take 1 tablet by mouth once daily, Disp: 60 tablet, Rfl: 2  •  Diclofenac Sodium (VOLTAREN) 1 % gel gel, Voltaren 1 % topical  gel, Disp: , Rfl:   •  Ergocalciferol (VITAMIN D2 PO), Vitamin D2 1,250 mcg (50,000 unit) capsule, Disp: , Rfl:   •  eszopiclone (LUNESTA) 2 MG tablet, Take 1 tablet by mouth Every Night. Take immediately before bedtime, Disp: 30 tablet, Rfl: 1  •  eszopiclone (LUNESTA) 2 MG tablet, , Disp: , Rfl:   •  HYDROcodone-acetaminophen (NORCO) 7.5-325 MG per tablet, , Disp: , Rfl:   •  lidocaine (LIDODERM) 5 %, Place 1 patch on the skin as directed by provider Daily. Remove & Discard patch within 12 hours or as directed by MD, Disp: 6 patch, Rfl: 1  •  methylPREDNISolone (MEDROL) 4 MG dose pack, Take as directed on package instructions., Disp: 21 each, Rfl: 0  •  multivitamin (THERAGRAN) tablet tablet, Multi Vitamin, Disp: , Rfl:   •  tiZANidine (ZANAFLEX) 2 MG tablet, , Disp: , Rfl:     History of Present Illness  This is a 71-year-old white female who presents to establish care in the hypertension clinic with reported multiple antihypertensive medication intolerances.  Apparently the patient has tried many medications and then stopped them on her own for either side effects or does not feel that they are effective.  She says that she has been intolerant to olmesartan, reports side effects with almost all blood pressure medications attempted, clonidine ineffective, says diuretics did not help.  Currently she is on Bystolic 10 mg daily.  She was unable to tolerate the generic (nebivolol).  When she tried the generic her blood pressure increased up to 200 systolic. She had an abnormal lipid panel in June 2022.  She is agreeable to trying a PCSK9 inhibitor since she has had statin intolerances in the past.  The patient denies MIs, stress tests, heart catheterizations, heart monitors, CVAs, seizures, DVTs, PEs, COPD, asthma, thyroid dysfunction, claudication, diabetes mellitus, or rheumatic fever.  She has had a couple TIAs remotely.  The patient denies any chest pain, shortness of breath, palpitations, dizziness, presyncope,  syncope. She  has seen a pain physician for injections in her back.  She is on limited activity due to her back pain.The patient denies any PIH, preeclampsia, or gestational diabetes.  She feels like she has some anxiety and depression, particularly since she retired.  She has difficulty sleeping and remotely was on Ambien but apparently had irregular heartbeats and this was discontinued and switched to Lunesta.  She has never had a sleep study before but has frequent nocturnal awakening.  Whenever she checks her blood pressure at home it is usually around 140 systolic.  She wants to preventatively keep her blood pressure  from increasing.  She has had some weight gain over the past year or so, particularly after she stopped smoking in 2019 and retired.  She had smoked 1 pack/day x 50 years prior to that.  She says that she had a renal ultrasound in 2021 with her nephrologist and was told that it was abnormal but acceptable-data deficit.  She has osteoporosis and was told that she needed to be on Fosamax but she was hesitant to try this medication.    Cardiovascular Disease Risk Factors  Hypertension, hyperlipidemia, increased age    Social History     Socioeconomic History   • Marital status:    Tobacco Use   • Smoking status: Former Smoker     Packs/day: 1.00     Years: 50.00     Pack years: 50.00     Types: Cigarettes     Start date: 1/1/1965     Quit date: 2/2/2019     Years since quitting: 3.4   • Smokeless tobacco: Never Used   Vaping Use   • Vaping Use: Never used   Substance and Sexual Activity   • Alcohol use: Never   • Drug use: Never   • Sexual activity: Not Currently     Partners: Male     Birth control/protection: Surgical     Comment: Was on birth control pill for about 15 yrs       Family History   Problem Relation Age of Onset   • Allergies Other    • Depression Other    • Diabetes Other    • Heart disease Other    • Hypertension Other    • Skin cancer Other    • Bleeding Disorder Other    •  Stroke Other    • Cancer Mother         multiple myeloma   • Anxiety disorder Mother    • Depression Mother    • Early death Mother         63 yrs old   • Mental illness Mother         Depression   • Alcohol abuse Father    • Diabetes Father    • Heart disease Father    • Hyperlipidemia Father    • Stroke Father    • Vision loss Father    • Arthritis Paternal Grandmother    • Cancer Brother         melanoma   • Depression Brother    • Early death Brother         63 yrs old   • Hyperlipidemia Brother    • Hyperlipidemia Sister    • Kidney disease Sister    • Breast cancer Neg Hx         no family hx       Review of Systems   Constitutional: Negative.   HENT: Negative.    Eyes: Negative.    Cardiovascular: Negative for chest pain, claudication, dyspnea on exertion, irregular heartbeat, leg swelling, near-syncope, orthopnea, palpitations, paroxysmal nocturnal dyspnea and syncope.   Respiratory: Positive for sleep disturbances due to breathing (frequent nocturnal awakening).    Endocrine: Negative.    Hematologic/Lymphatic: Negative.    Skin: Negative.    Musculoskeletal: Positive for back pain and stiffness.   Gastrointestinal: Negative.    Genitourinary: Negative.    Neurological: Negative for excessive daytime sleepiness, dizziness, focal weakness and seizures.   Allergic/Immunologic: Negative.       Obtained and negative except as outlined in problem list and HPI.      ECG 12 Lead    Date/Time: 7/6/2022 8:21 AM  Performed by: Sherrie Apodaca APRN  Authorized by: Sherrie Apodaca APRN   Rhythm comments: Normal sinus rhythm, normal ECG, 62 bpm, QRS 76 ms,  ms,  ms, no change from last ECG in May 2014                 Objective:       Vitals:    07/05/22 1404 07/05/22 1406 07/05/22 1407 07/05/22 1408   BP: 159/82 140/89 151/76 133/85   BP Location: Right arm Right arm Left arm Left arm   Patient Position: Sitting Standing Standing Sitting   Pulse: 64 66 71 65   SpO2: 99% 99% 99% 99%   Weight: 63 kg (139  "lb) 63 kg (139 lb) 63 kg (139 lb) 63 kg (139 lb)   Height: 162.6 cm (64\") 162.6 cm (64\") 162.6 cm (64\") 162.6 cm (64\")   Recheck blood pressure right arm sitting was 124/60  Body mass index is 23.86 kg/m².    Constitutional:       Appearance: Healthy appearance. Not in distress.   Neck:      Vascular: No JVR. JVD normal.   Pulmonary:      Effort: Pulmonary effort is normal.      Breath sounds: Normal breath sounds. No wheezing. No rhonchi. No rales.   Chest:      Chest wall: Not tender to palpatation.   Cardiovascular:      PMI at left midclavicular line. Normal rate. Regular rhythm. Normal S1. Normal S2.      Murmurs: There is a grade 1/6 systolic murmur at the URSB.      No gallop. No click. No rub.   Pulses:     Carotid: 2+ bilaterally.     Radial: 2+ bilaterally.     Femoral: 2+ bilaterally.     Dorsalis pedis: 2+ bilaterally.     Posterior tibial: 2+ bilaterally.  Edema:     Peripheral edema absent.   Abdominal:      General: Bowel sounds are normal.      Palpations: Abdomen is soft.      Tenderness: There is no abdominal tenderness.   Musculoskeletal: Normal range of motion.         General: No tenderness. Skin:     General: Skin is warm and dry.   Neurological:      General: No focal deficit present.      Mental Status: Alert and oriented to person, place and time.         Lab Review:   Results for orders placed or performed in visit on 06/15/22   Vitamin D 25 Hydroxy    Specimen: Blood    Blood  Release to [facility]   Result Value Ref Range    25 Hydroxy, Vitamin D 55.7 30.0 - 100.0 ng/mL     6/13/2022:  · Lipid panel: Cholesterol 244, triglycerides 179, HDL 80,   · Hemoglobin A1c 5.7%  · TSH 2.36    2/10/2022:  · CMP: BUN 24, creatinine 1.4, GFR 38, sodium 140, chloride 104, carbon dioxide 16, calcium 9.5, protein 6.3, albumin 4.5, globulin 1.8, bilirubin less than 0.2, alkaline phosphatase 97, AST 20, ALT 8  · CBC: WBC 6.7, RBC 4.7, hemoglobin 14.3, hematocrit 41.9, MCV 89, MCH 30.4, MCHC 34.1, RDW 13.4, " platelets 181  · Vitamin D-45.1  · Magnesium 2.2        Assessment:     Patient with hypertension with multiple medication intolerances due to side effects.  I will order an echocardiogram to assess heart structure/function.  I will try to obtain the patient's renal ultrasound/duplex from her nephrologist and see if he has also done prior testing for a PRA, aldosterone, plasma metanephrines before ordering these studies.  She is at risk for sleep apnea with chronic insomnia so I will screen her with an outpatient sleep oximetry study.  She has untreated hyperlipidemia and would recommend a PCSK9 inhibitor versus Leqvio; patient has statin intolerances and her ASCVD 10-year risk is 16.4%, 7.7% if treated.  She is agreeable to a PCSK9 inhibitor and I will have Trisha Wood RN try and get a prior authorization for this.  Patient has frequent nocturnal awakening and insomnia but has never had a sleep study so I will order an outpatient sleep oximetry study to assess for ZAYNAB.  On recheck blood pressure in office her blood pressure was WNL.  I will have her wear 24 ambulatory blood pressure monitor; further decision-making upon review.  She had a normal ECG in office today with no change since her 2014 ECG.     Diagnosis Plan   1. Essential hypertension   24-hour ambulatory blood pressure monitoring   2. Hyperlipidemia LDL goal <100   Try to get PCSK9 inhibitor approved with prior authorization   3. Suspected sleep apnea   Outpatient sleep oximetry study   4. Elevated blood-pressure reading, without diagnosis of hypertension   24 Hour Blood Pressure Monitor   5. Heart murmur   Echocardiogram          Plan:       1. Patient to continue current medications and close follow up with the above providers.  2. Tentative cardiology follow up in 6-8 weeks in the hypertension clinic or patient may return sooner PRN.  3. +/- PRA, aldosterone, plasma metanephrines; may consider after next appointment if I am unable to obtain these  results from her nephrologist  4. Try to obtain renal artery duplex results from her nephrologist  5. Echocardiogram  6. 24-hour ambulatory blood pressure monitor; further medical decision making upon review  7. Outpatient sleep oximetry monitor  8. We will try and get prior authorization for PCSK9 inhibitor versus Leqvio    Electronically signed by WELLINGTON Donnelly, 07/06/22, 8:45 AM EDT.

## 2022-07-05 ENCOUNTER — OFFICE VISIT (OUTPATIENT)
Dept: CARDIOLOGY | Facility: CLINIC | Age: 72
End: 2022-07-05

## 2022-07-05 ENCOUNTER — CLINICAL SUPPORT (OUTPATIENT)
Dept: CARDIOLOGY | Facility: HOSPITAL | Age: 72
End: 2022-07-05

## 2022-07-05 ENCOUNTER — PATIENT ROUNDING (BHMG ONLY) (OUTPATIENT)
Dept: CARDIOLOGY | Facility: CLINIC | Age: 72
End: 2022-07-05

## 2022-07-05 ENCOUNTER — HOSPITAL ENCOUNTER (OUTPATIENT)
Dept: CARDIOLOGY | Facility: HOSPITAL | Age: 72
Discharge: HOME | End: 2022-07-05

## 2022-07-05 VITALS
DIASTOLIC BLOOD PRESSURE: 85 MMHG | BODY MASS INDEX: 23.73 KG/M2 | HEIGHT: 64 IN | OXYGEN SATURATION: 99 % | WEIGHT: 139 LBS | HEART RATE: 65 BPM | SYSTOLIC BLOOD PRESSURE: 133 MMHG

## 2022-07-05 DIAGNOSIS — I10 ESSENTIAL HYPERTENSION: Primary | ICD-10-CM

## 2022-07-05 DIAGNOSIS — R03.0 ELEVATED BLOOD-PRESSURE READING, WITHOUT DIAGNOSIS OF HYPERTENSION: ICD-10-CM

## 2022-07-05 DIAGNOSIS — R29.818 SUSPECTED SLEEP APNEA: ICD-10-CM

## 2022-07-05 DIAGNOSIS — E78.5 HYPERLIPIDEMIA LDL GOAL <100: ICD-10-CM

## 2022-07-05 DIAGNOSIS — R01.1 HEART MURMUR: ICD-10-CM

## 2022-07-05 PROCEDURE — 99214 OFFICE O/P EST MOD 30 MIN: CPT | Performed by: NURSE PRACTITIONER

## 2022-07-05 PROCEDURE — 93000 ELECTROCARDIOGRAM COMPLETE: CPT | Performed by: NURSE PRACTITIONER

## 2022-07-05 PROCEDURE — 93786 AMBL BP MNTR W/SW REC ONLY: CPT

## 2022-07-05 NOTE — PROGRESS NOTES
Deidre Gomez  : 1950  MRN: [id number]  Today's Date: 22    Bedtime __________    I woke up at _______      [facility]  [address] [place] [address], [address] [address]  [phone]    During the day, the monitor will pump air and the cuff will inflate approximately every twenty minutes.  In the evening, the pump-up interval changes to every thirty minutes.  In the late evening (9:00 p.m.--10:00 p.m.), the cuff will inflate every hour until 8:00 a.m. the following morning when the twenty-minute interval begins again.    When you feel the cuff inflating, stop what you are doing, straighten your arm, and be still.  If while the cuff is inflated, your arm is bent or there is too much movement, the cuff will re-inflate and attempt another reading.  When the cuff deflates, resume your normal activity.    The monitor is self-contained and records and displays all readings.  Every time the cuff deflates, your blood pressure and heart rate will be displayed twice.    If you bathe or change clothing, remove the monitor.  It is difficult to re-wrap or re-apply the cuff; before removing it, make sure someone is available to  help you.  Whether the cuff is on your left or right arm, the gray tube must be directly above the bend of your elbow.    If the cuff inflates when it is not wrapped around your arm, let it deflate and disconnect it from the black tube, push out any remaining air, reconnect the tubing, and wrap it around your arm again.  The monitor will resume readings at the next proper time.    After wearing the cuff for twenty-four hours, turn the monitor off by holding the button for several seconds, or by removing the batteries.            BPMONITORINSTRUCTIONS 8.2019                  Ambulatory Blood Pressure Monitor Patient Agreement    I, Deidre Gomez, 1950, [id number] assume full responsibility for the safekeeping and care of the monitor  while it is in my possession.      I have been advised that it is not waterproof and that any water that comes in contact with the monitor may cause damage that could require the unit to be replaced.    Until I return the monitor and its attachments to [facility], I will assume responsibility for any damage to the monitor due to neglect or misuse of same.    I understand that I am responsible for returning the monitor or I will be responsible for all costs for replacing the equipment.  Failure to return the monitor will result in a replacement charge of $1800.00 which will be billed to me five business days following the date of hookup.    Unless instructed otherwise, I will wear the monitor for 24 hours.    I was given the opportunity to ask questions and left the office with the device instructions.    I will return the monitor no later than 07/06/2022 to:    [facility], [address] [place] [address], [address], [place] [address]    Date of Hookup: 07/05/22    Ordered by: Sherrie Apodaca     Hooked up by: Bonita Horton, [facility] Cuff placed on left arm.    Serial Number: [id number]    Termination Date: 07/06/2022  Time: 1509    Patient or Guardian Signature: ______________________, 07/05/22    Date Monitor Returned: __________________            BPMONITORINSTRUCTIONS 8.12.2019

## 2022-07-05 NOTE — PROGRESS NOTES
July 5, 2022    Hello, may I speak with Deidre Gomez? Yes.    My name is Mayra HARRIS     I am  with [facility]  [address]  [facility] [address] [phone].    Before we get started may I verify your date of birth? 1950, yes, correct.    I am calling to officially welcome you to our practice and ask about your recent visit. Is this a good time to talk? Yes.    Tell me about your visit with us. What things went well?  Everything.       We're always looking for ways to make our patients' experiences even better. Do you have recommendations on ways we may improve?  No.    Overall were you satisfied with your first visit to our practice? Yes.       I appreciate you taking the time to speak with me today. Is there anything else I can do for you? No.      Thank you, and have a great day.

## 2022-07-06 PROBLEM — R01.1 HEART MURMUR: Status: ACTIVE | Noted: 2022-07-06

## 2022-07-11 RX ORDER — TELMISARTAN 20 MG/1
20 TABLET ORAL DAILY
Qty: 30 TABLET | Refills: 5 | Status: SHIPPED | OUTPATIENT
Start: 2022-07-11 | End: 2022-07-22 | Stop reason: DRUGHIGH

## 2022-07-14 ENCOUNTER — TELEPHONE (OUTPATIENT)
Dept: INTERNAL MEDICINE | Facility: CLINIC | Age: 72
End: 2022-07-14

## 2022-07-14 NOTE — TELEPHONE ENCOUNTER
DR. RAMIRO AMES WITH [facility] PT WAS CALLING IN REGARDS TO PATIENT'S BP AT PT APPT.    SHE STATED THAT SHE WAS UNABLE TO PROVIDE HER PT DUE TO PATIENTS ELEVATED BP.    SHE SAID THE FIRST BP READING /106.    THEY HAD HER SIT DOWN AND REST FOR ABOUT 15MINS AND TOOK A SECOND READING THAT /105.    SHE SAID SHE SUGGESTED THAT THE PATIENT GO TO [facility], BUT THAT THE PATIENT DECLINED SAYING THAT HER BP WAS NORMALLY THAT HIGH.    I SUGGESTED FOR HER TO TELL PATIENT TO GO TO [facility] OR [facility] TO BE CHECKED OUT.    DR. AMES SAID THAT SHE WOULD, AND WAS WONDERING IF SOMEONE CLINICAL COULD CALL THE PATIENT AS WELL AND ADVISE ON GOING TO ER.    PLEASE ADVISE.    KAYE FAGAN  [phone]

## 2022-07-15 ENCOUNTER — TELEPHONE (OUTPATIENT)
Dept: CARDIOLOGY | Facility: CLINIC | Age: 72
End: 2022-07-15

## 2022-07-15 ENCOUNTER — HOSPITAL ENCOUNTER (OUTPATIENT)
Dept: CARDIOLOGY | Facility: HOSPITAL | Age: 72
Discharge: HOME | End: 2022-07-15
Admitting: NURSE PRACTITIONER

## 2022-07-15 LAB
ASCENDING AORTA: 3 CM
BH CV ECHO MEAS - AO ROOT DIAM: 3 CM
BH CV ECHO MEAS - EDV(CUBED): 49.8 ML
BH CV ECHO MEAS - EDV(MOD-SP2): 120 ML
BH CV ECHO MEAS - EDV(MOD-SP4): 104 ML
BH CV ECHO MEAS - EF(MOD-BP): 55 %
BH CV ECHO MEAS - EF(MOD-SP2): 57.5 %
BH CV ECHO MEAS - EF(MOD-SP4): 50 %
BH CV ECHO MEAS - ESV(CUBED): 11.4 ML
BH CV ECHO MEAS - ESV(MOD-SP2): 51 ML
BH CV ECHO MEAS - ESV(MOD-SP4): 52 ML
BH CV ECHO MEAS - FS: 38.9 %
BH CV ECHO MEAS - IVS/LVPW: 0.95 CM
BH CV ECHO MEAS - IVSD: 1.1 CM
BH CV ECHO MEAS - LA DIMENSION: 3.3 CM
BH CV ECHO MEAS - LV DIASTOLIC VOL/BSA (35-75): 62.1 CM2
BH CV ECHO MEAS - LV MASS(C)D: 135.3 GRAMS
BH CV ECHO MEAS - LV MAX PG: 4.4 MMHG
BH CV ECHO MEAS - LV MEAN PG: 2 MMHG
BH CV ECHO MEAS - LV SYSTOLIC VOL/BSA (12-30): 31 CM2
BH CV ECHO MEAS - LV V1 MAX: 105 CM/SEC
BH CV ECHO MEAS - LV V1 VTI: 22 CM
BH CV ECHO MEAS - LVIDD: 3.7 CM
BH CV ECHO MEAS - LVIDS: 2.3 CM
BH CV ECHO MEAS - LVOT AREA: 2.5 CM2
BH CV ECHO MEAS - LVOT DIAM: 1.8 CM
BH CV ECHO MEAS - LVPWD: 1.1 CM
BH CV ECHO MEAS - MV A MAX VEL: 65.6 CM/SEC
BH CV ECHO MEAS - MV DEC SLOPE: 275 CM/SEC2
BH CV ECHO MEAS - MV DEC TIME: 0.16 MSEC
BH CV ECHO MEAS - MV E MAX VEL: 56.3 CM/SEC
BH CV ECHO MEAS - MV E/A: 0.86
BH CV ECHO MEAS - MV MAX PG: 2.7 MMHG
BH CV ECHO MEAS - MV MEAN PG: 1 MMHG
BH CV ECHO MEAS - MV P1/2T: 95.4 MSEC
BH CV ECHO MEAS - MV V2 VTI: 27.8 CM
BH CV ECHO MEAS - MVA(P1/2T): 2.31 CM2
BH CV ECHO MEAS - MVA(VTI): 2.01 CM2
BH CV ECHO MEAS - PA ACC SLOPE: 370 CM/SEC2
BH CV ECHO MEAS - PA ACC TIME: 0.17 SEC
BH CV ECHO MEAS - PA PR(ACCEL): 4.8 MMHG
BH CV ECHO MEAS - SI(MOD-SP2): 41.2 ML/M2
BH CV ECHO MEAS - SI(MOD-SP4): 31 ML/M2
BH CV ECHO MEAS - SV(LVOT): 56 ML
BH CV ECHO MEAS - SV(MOD-SP2): 69 ML
BH CV ECHO MEAS - SV(MOD-SP4): 52 ML
BH CV ECHO MEAS - TAPSE (>1.6): 1.7 CM
BH CV VAS BP LEFT ARM: NORMAL MMHG
BH CV XLRA - RV BASE: 3.1 CM
BH CV XLRA - RV LENGTH: 5 CM
BH CV XLRA - RV MID: 2.4 CM
BH CV XLRA - TDI S': 10.9 CM/SEC
LEFT ATRIUM VOLUME INDEX: 28.6 ML/M2
LV EF 2D ECHO EST: 68 %
MAXIMAL PREDICTED HEART RATE: 149 BPM
STRESS TARGET HR: 127 BPM

## 2022-07-15 PROCEDURE — 93306 TTE W/DOPPLER COMPLETE: CPT | Performed by: INTERNAL MEDICINE

## 2022-07-15 PROCEDURE — 93306 TTE W/DOPPLER COMPLETE: CPT

## 2022-07-15 NOTE — TELEPHONE ENCOUNTER
Left voice message for patient to return call and let me know if she has a blood pressure cuff and can take her bp and let me know what it is, per Dr Salas I advised if her blood pressure is still that high she is to go to the ER

## 2022-07-15 NOTE — TELEPHONE ENCOUNTER
"PATIENT CALLING IN REGARDS TO HER RECENT BP READING.    SHE SAID AFTER TAKING RX, HER BP READING /92.    PER PABLO'S PREVIOUS MESSAGE, TOLD PATIENT THAT DR. GTZ IS ADVISING HER TO GO TO THE ER IF BP IS STILL ELEVATED.    PATIENT SAYS SHE IS NOT GOING TO THE ER - SHE IS PARANOID BECAUSE SHE HAS NOT HAD THE COVID VACCINE, SO SHE DECLINED GOING TO ER.    SHE STATED THAT SHE HAS TOLD DR. GTZ FOR YEARS THAT HER \"NORMAL\" BP IS SLIGHTLY ELEVATED.    SHE ALSO STATED THAT SHE HAS TOLD DR. GTZ MULTIPLE TIMES THAT THE BP RX SHE IS ON IS NOT HELPING, BUT THAT NO ONE HAS TRIED ANOTHER RX WITH HER YET.    SHE WOULD LIKE A PHONE CALL BACK ASAP TO FURTHER DISCUSS POSSIBLY SWITCHING TO DIFFERENT RX.    PLEASE ADVISE    KAYE FAGAN  [phone]  "

## 2022-07-15 NOTE — TELEPHONE ENCOUNTER
GERMAIN     Spoke with patient regarding bp, she stated she took it this morning and it has come down to 162/88 and that was before she took her medicine. I advised her to take her bp again within the next hour and if it hasn't gone down then she will need to go to the ER. She is also seeing a cardiologist regarding her bp and I let her know she needs to call them to update them on her readings. She verbalized understanding.

## 2022-07-15 NOTE — TELEPHONE ENCOUNTER
KAYE SHEFFIELD RETURNED YOUR CALL ON 7-15-22 AT 11:10AM.  PLEASE CALL HER BACK AT YOUR EARLIEST CONVENIENCE.  THANK YOU    [phone]

## 2022-07-15 NOTE — TELEPHONE ENCOUNTER
Patient called. She has not been monitoring BP regularly. Patient started telmisartan 20 mg 7/14 afternoon and took second dose this morning 7/15.      7/14 224/106 (before telmisartan) 8PM 182/97  7/15 162/88 before meds 1pm 164/90    Current /98    Advised pt that if BP did not go down, or increased, she needed to go to ED. Pt verbalizes understanding.    Advised pt that telmisartan takes a few days to build in system and reach therapeutic levels, however we did not want her BP to maintain this high. Advised pt to call Monday with BP readings, regardless of if she went to ED or not. Pt verbalizes understanding and agreeable to plan.

## 2022-07-18 NOTE — TELEPHONE ENCOUNTER
We have tried her on multiple BP meds but she has a reaction and does not tolerate them  2. We referred her to Cardiology and they are helping manage her BP. Needs to notify them. She refuses to go to ED as recommended.  3. Can increase her micardis to 40 mg po qd, she can take two of her 20 together, we can send in a new script for micardis 40 mg po qd

## 2022-07-18 NOTE — TELEPHONE ENCOUNTER
Notified patient of recommendations. She just woke up about an hour ago and hasn't taken her medication but she will take 2. I advised her to check her bp again about an hour after taking the medication and then follow up with cardiology. She verbalized understanding.

## 2022-07-19 NOTE — TELEPHONE ENCOUNTER
Patient called back, LVM, and states that she has not been checking her BP because yesterday she felt bad. Pt did check BP this morning 7/19 152/90, after telmisartan.    Called pt, no answer. LVM advising patient that she needs to monitor BP, even if she is feeling bad and give me a call back.

## 2022-07-19 NOTE — TELEPHONE ENCOUNTER
Sherrie Apodaca, WELLINGTON Lucero, Trisha Steele, RN  Caller: Unspecified (4 days ago,  4:43 PM)  We can always increase the dose of that if we need to.  Hopefully she will call back with her blood pressure readings.  She needs to start monitoring this closely.   _____________________________________________    Called pt, no answer, LVM for return call.

## 2022-07-20 NOTE — TELEPHONE ENCOUNTER
Patient called, LVM with below readings.     7/19 2p 152/90 6p 145/85 11p 150/82  7/20 153/69 before telmisartan    Patient started telmisartan 20 mg 7/14.    Called pt back, no answer, LVM for return call.

## 2022-07-22 RX ORDER — TELMISARTAN 80 MG/1
80 TABLET ORAL DAILY
Qty: 90 TABLET | Refills: 3 | Status: SHIPPED | OUTPATIENT
Start: 2022-07-22 | End: 2022-07-25 | Stop reason: SDUPTHER

## 2022-07-22 NOTE — TELEPHONE ENCOUNTER
Sherrie Apodaca APRN Hurley, Mary Katheri, RN  Caller: Unspecified (3 days ago, 10:36 AM)  Have her increase her telmisartan to 80mg daily and to start taking her Bystolic at night. And to take her medication with food on her stomach. Also, tell her happy birthday today! Thanks! Have her continue to monitor bp at home and call back next week with readings.   ____________________________________________    Called pt and gave WELLINGTON Otero recommendations above. Pt verbalizes understanding and agreeable to plan.

## 2022-07-22 NOTE — TELEPHONE ENCOUNTER
Pt called back. Pt states that she doesn't feel very good, she feels nauseous. Pt reports she will occasionally have a skipped heart beat    7/22 196/107 212/99 186/99 HR 63-68  7/21 146/82  161/76  173/93    Current cardiac meds:  Telmisartan 40 mg (increased from 20 mg 7/19/22)  Bystolic 10 mg daily    Pt denies chest pain, SOB, dizziness, swelling.       Please advise.

## 2022-07-25 RX ORDER — TELMISARTAN 80 MG/1
80 TABLET ORAL DAILY
Qty: 90 TABLET | Refills: 3 | Status: SHIPPED | OUTPATIENT
Start: 2022-07-25 | End: 2022-07-26 | Stop reason: SDUPTHER

## 2022-07-26 RX ORDER — TELMISARTAN 80 MG/1
80 TABLET ORAL DAILY
Qty: 90 TABLET | Refills: 3 | Status: SHIPPED | OUTPATIENT
Start: 2022-07-26 | End: 2022-10-13 | Stop reason: ALTCHOICE

## 2022-08-03 DIAGNOSIS — F51.04 PSYCHOPHYSIOLOGICAL INSOMNIA: ICD-10-CM

## 2022-08-04 RX ORDER — ESZOPICLONE 2 MG/1
2 TABLET, FILM COATED ORAL NIGHTLY
Qty: 30 TABLET | Refills: 1 | Status: SHIPPED | OUTPATIENT
Start: 2022-08-04 | End: 2022-10-10 | Stop reason: SDUPTHER

## 2022-08-04 NOTE — TELEPHONE ENCOUNTER
Last Office Visit: 06/15/2022  Next Office Visit:10/03/2022    Last Refill Date:05/28/2022  Quantity:30  Refills:1    Pharmacy:     Please review pended refill request for any changes needed on refills or quantities. Thank you!

## 2022-08-24 DIAGNOSIS — F41.9 ANXIETY: ICD-10-CM

## 2022-08-24 RX ORDER — BUPROPION HYDROCHLORIDE 300 MG/1
TABLET ORAL
Qty: 90 TABLET | Refills: 1 | Status: SHIPPED | OUTPATIENT
Start: 2022-08-24 | End: 2023-04-03 | Stop reason: SDUPTHER

## 2022-10-04 ENCOUNTER — TELEPHONE (OUTPATIENT)
Dept: INTERNAL MEDICINE | Facility: CLINIC | Age: 72
End: 2022-10-04

## 2022-10-04 NOTE — TELEPHONE ENCOUNTER
Spoke with patient and let her know that where Dr Salas prescribes her a controlled substance that she has to be seen every 3 months. Patient rescheduled appt for November 7th.

## 2022-10-04 NOTE — TELEPHONE ENCOUNTER
Caller: Deidre Gomez    Relationship: Self    Best call back number: [phone]    What is the best time to reach you: ANY    Who are you requesting to speak with (clinical staff, provider,  specific staff member): CLINICAL    What was the call regarding: PATIENT WOULD LIKE A CALL BACK TO DISCUSS WHETHER OR NOT HER FOLLOW-UP APPOINTMENT IS MANDATORY BEFORE RESCHEDULING. PATIENT STATES THAT SHE IS CURRENTLY FEELING FINE.     Do you require a callback: YES, PLEASE CALL BACK TO ADVISE.     THANK YOU.

## 2022-10-06 NOTE — TELEPHONE ENCOUNTER
Called pt and she wanted to cancel the 11/7 everardo and take the 10/12 appt. Appointment changes have been confirmed.

## 2022-10-06 NOTE — TELEPHONE ENCOUNTER
Caller: Deidre Gomez    Relationship: Self    Best call back number: [phone]    What is the best time to reach you: ANY    Who are you requesting to speak with (clinical staff, provider,  specific staff member): CLINICAL/SHAYY    What was the call regarding: PATIENT CALLED TO RESCHEDULE HER 11/7/22 APPOINTMENT- NEEDS MEDICATION REFILLS. REFUSED 10/12/22 7:45 AM. PATIENT WANTS MEDICATION REFILLED.    Do you require a callback: YES, PLEASE CALL.

## 2022-10-10 DIAGNOSIS — F51.04 PSYCHOPHYSIOLOGICAL INSOMNIA: ICD-10-CM

## 2022-10-10 RX ORDER — ESZOPICLONE 2 MG/1
2 TABLET, FILM COATED ORAL NIGHTLY
Qty: 30 TABLET | Refills: 1 | Status: SHIPPED | OUTPATIENT
Start: 2022-10-10 | End: 2022-12-13 | Stop reason: SDUPTHER

## 2022-10-10 NOTE — TELEPHONE ENCOUNTER
Last appointment: 6/15/2022  Next appointment: 10/12/2022    Raoul:   OZIELS:  CSA: 11/30/2020    Last Refill: 8/4/2022  Quantity: 30  Refills: 1

## 2022-10-12 ENCOUNTER — LAB (OUTPATIENT)
Dept: LAB | Facility: HOSPITAL | Age: 72
End: 2022-10-12

## 2022-10-12 ENCOUNTER — PRIOR AUTHORIZATION (OUTPATIENT)
Dept: INTERNAL MEDICINE | Facility: CLINIC | Age: 72
End: 2022-10-12

## 2022-10-12 ENCOUNTER — OFFICE VISIT (OUTPATIENT)
Dept: INTERNAL MEDICINE | Facility: CLINIC | Age: 72
End: 2022-10-12

## 2022-10-12 VITALS
WEIGHT: 142 LBS | BODY MASS INDEX: 24.24 KG/M2 | HEIGHT: 64 IN | DIASTOLIC BLOOD PRESSURE: 87 MMHG | HEART RATE: 59 BPM | SYSTOLIC BLOOD PRESSURE: 140 MMHG | TEMPERATURE: 97.6 F | OXYGEN SATURATION: 99 %

## 2022-10-12 DIAGNOSIS — I10 ESSENTIAL HYPERTENSION: ICD-10-CM

## 2022-10-12 DIAGNOSIS — E78.5 HYPERLIPIDEMIA LDL GOAL <100: Primary | ICD-10-CM

## 2022-10-12 DIAGNOSIS — R73.9 HYPERGLYCEMIA: ICD-10-CM

## 2022-10-12 DIAGNOSIS — F41.9 ANXIETY: ICD-10-CM

## 2022-10-12 DIAGNOSIS — Z79.899 ENCOUNTER FOR LONG-TERM (CURRENT) USE OF OTHER MEDICATIONS: ICD-10-CM

## 2022-10-12 DIAGNOSIS — E78.5 HYPERLIPIDEMIA LDL GOAL <100: ICD-10-CM

## 2022-10-12 DIAGNOSIS — N18.30 STAGE 3 CHRONIC KIDNEY DISEASE, UNSPECIFIED WHETHER STAGE 3A OR 3B CKD: ICD-10-CM

## 2022-10-12 LAB
ALBUMIN SERPL-MCNC: 4.6 G/DL (ref 3.5–5.2)
ALBUMIN/GLOB SERPL: 2.6 G/DL
ALP SERPL-CCNC: 86 U/L (ref 39–117)
ALT SERPL-CCNC: 8 U/L (ref 1–33)
AST SERPL-CCNC: 24 U/L (ref 1–32)
BASOPHILS # BLD AUTO: 0.04 10*3/MM3 (ref 0–0.2)
BASOPHILS NFR BLD AUTO: 0.6 % (ref 0–1.5)
BILIRUB SERPL-MCNC: 0.2 MG/DL (ref 0–1.2)
BUN SERPL-MCNC: 20 MG/DL (ref 8–23)
BUN/CREAT SERPL: 18 (ref 7–25)
CALCIUM SERPL-MCNC: 9.5 MG/DL (ref 8.6–10.5)
CHLORIDE SERPL-SCNC: 102 MMOL/L (ref 98–107)
CHOLEST SERPL-MCNC: 227 MG/DL (ref 0–200)
CO2 SERPL-SCNC: 26.3 MMOL/L (ref 22–29)
CREAT SERPL-MCNC: 1.11 MG/DL (ref 0.57–1)
EGFRCR SERPLBLD CKD-EPI 2021: 52.9 ML/MIN/1.73
EOSINOPHIL # BLD AUTO: 0.11 10*3/MM3 (ref 0–0.4)
EOSINOPHIL NFR BLD AUTO: 1.6 % (ref 0.3–6.2)
ERYTHROCYTE [DISTWIDTH] IN BLOOD BY AUTOMATED COUNT: 14.1 % (ref 12.3–15.4)
GLOBULIN SER CALC-MCNC: 1.8 GM/DL
GLUCOSE SERPL-MCNC: 82 MG/DL (ref 65–99)
HBA1C MFR BLD: 5.9 % (ref 4.8–5.6)
HCT VFR BLD AUTO: 41.6 % (ref 34–46.6)
HDLC SERPL-MCNC: 81 MG/DL (ref 40–60)
HGB BLD-MCNC: 14.3 G/DL (ref 12–15.9)
IMM GRANULOCYTES # BLD AUTO: 0.03 10*3/MM3 (ref 0–0.05)
IMM GRANULOCYTES NFR BLD AUTO: 0.4 % (ref 0–0.5)
LDLC SERPL CALC-MCNC: 108 MG/DL (ref 0–100)
LYMPHOCYTES # BLD AUTO: 2.73 10*3/MM3 (ref 0.7–3.1)
LYMPHOCYTES NFR BLD AUTO: 40.1 % (ref 19.6–45.3)
MCH RBC QN AUTO: 30 PG (ref 26.6–33)
MCHC RBC AUTO-ENTMCNC: 34.4 G/DL (ref 31.5–35.7)
MCV RBC AUTO: 87.4 FL (ref 79–97)
MONOCYTES # BLD AUTO: 0.64 10*3/MM3 (ref 0.1–0.9)
MONOCYTES NFR BLD AUTO: 9.4 % (ref 5–12)
NEUTROPHILS # BLD AUTO: 3.25 10*3/MM3 (ref 1.7–7)
NEUTROPHILS NFR BLD AUTO: 47.9 % (ref 42.7–76)
NRBC BLD AUTO-RTO: 0 /100 WBC (ref 0–0.2)
PLATELET # BLD AUTO: 185 10*3/MM3 (ref 140–450)
POTASSIUM SERPL-SCNC: 5.2 MMOL/L (ref 3.5–5.2)
PROT SERPL-MCNC: 6.4 G/DL (ref 6–8.5)
RBC # BLD AUTO: 4.76 10*6/MM3 (ref 3.77–5.28)
SODIUM SERPL-SCNC: 138 MMOL/L (ref 136–145)
TRIGL SERPL-MCNC: 225 MG/DL (ref 0–150)
VLDLC SERPL CALC-MCNC: 38 MG/DL (ref 5–40)
WBC # BLD AUTO: 6.8 10*3/MM3 (ref 3.4–10.8)

## 2022-10-12 PROCEDURE — 99214 OFFICE O/P EST MOD 30 MIN: CPT | Performed by: INTERNAL MEDICINE

## 2022-10-12 RX ORDER — NEBIVOLOL HYDROCHLORIDE 10 MG/1
10 TABLET ORAL DAILY
Qty: 60 TABLET | Refills: 2 | Status: SHIPPED | OUTPATIENT
Start: 2022-10-12 | End: 2023-04-03

## 2022-10-12 RX ORDER — ALPRAZOLAM 0.5 MG/1
0.5 TABLET ORAL 2 TIMES DAILY PRN
Qty: 60 TABLET | Refills: 2 | Status: SHIPPED | OUTPATIENT
Start: 2022-10-12 | End: 2023-04-03

## 2022-10-12 NOTE — PROGRESS NOTES
Subjective   Deidre Gomez is a 72 y.o. female.   Chief Complaint   Patient presents with   • Hyperlipidemia   • Hypertension   • Insomnia   • Anxiety       History of Present Illness   Here for f/u on chronic conditions: HTN, HLP, insomnia, and anxiety and depression. HTN not controlled with Bystolic and Micardis. She is seeing Cardiology at [facility] to get BP under better control  But cxl f/u and megan f/u for March. . Limited in treatment as she does not tolerate most BP meds.  HLP controlled with Repatha. Anxiety and depression controlled wiht Wellbutrin and Xanax.  Insomnia controlled with Lunesta.  She no longer goes to kidney doctor, she stopped going on her own.   The following portions of the patient's history were reviewed and updated as appropriate: allergies, current medications, past family history, past medical history, past social history, past surgical history and problem list.  Past Medical History:   Diagnosis Date   • ADHD (attention deficit hyperactivity disorder) 1990’s    Prescribed drug   • Allergic Since childhood    Pills, cortisone,inhaler,  nose drop,   • Anemia In my 40’s   • Anxiety Always    Pills , antidepressants, anti anxiety   • Arthritis    • Cataract In my mid 50's   • Depression Always    Drugs   • Glaucoma    • Headache Since childhood    Excedrin   • Heart murmur 2022   • HL (hearing loss) Since my 50’s   • Hyperlipidemia    • Hypertension    • Left breast mass 09/14/2016   • Leg numbness    • Low back pain In my 60's    spinal & drugs   • Osteopenia    • Renal insufficiency    • Scoliosis 70 yrs old   • Stroke (HCC) 1997?   • Tobacco abuse counseling    • Visual impairment In my 40's    glasses     Past Surgical History:   Procedure Laterality Date   • APPENDECTOMY     • BREAST BIOPSY     • BREAST CYST ASPIRATION     • COLONOSCOPY  2017   • COSMETIC SURGERY  50 yrs old   • HYSTERECTOMY     • OOPHORECTOMY     • TUBAL ABDOMINAL LIGATION  35 yrs old     Family History  "  Problem Relation Age of Onset   • Allergies Other    • Depression Other    • Diabetes Other    • Heart disease Other    • Hypertension Other    • Skin cancer Other    • Bleeding Disorder Other    • Stroke Other    • Cancer Mother         multiple myeloma   • Anxiety disorder Mother    • Depression Mother    • Early death Mother         63 yrs old   • Mental illness Mother         Depression/Anxiety   • Alcohol abuse Father    • Diabetes Father    • Heart disease Father    • Hyperlipidemia Father    • Stroke Father    • Vision loss Father    • Arthritis Paternal Grandmother    • Cancer Brother         melanoma   • Depression Brother    • Early death Brother         63 yrs old   • Hyperlipidemia Brother    • Hyperlipidemia Sister    • Kidney disease Sister    • Breast cancer Neg Hx         no family hx       Social History     Socioeconomic History   • Marital status:    Tobacco Use   • Smoking status: Former     Packs/day: 1.00     Years: 50.00     Pack years: 50.00     Types: Cigarettes     Start date: 1/1/1965     Quit date: 2/2/2019     Years since quitting: 3.6   • Smokeless tobacco: Never   Vaping Use   • Vaping Use: Never used   Substance and Sexual Activity   • Alcohol use: Never   • Drug use: Never   • Sexual activity: Not Currently     Partners: Male     Birth control/protection: Surgical, Birth control pill, Hysterectomy     Comment: Was on birth control pill for about 15 yrs       Review of Systems   Constitutional: Negative for fatigue and fever.   Respiratory: Negative for cough and shortness of breath.    Cardiovascular: Negative for chest pain and leg swelling.   Musculoskeletal: Positive for arthralgias.   Neurological: Negative for weakness.   Psychiatric/Behavioral: Negative for confusion. The patient is nervous/anxious.      /87   Pulse 59   Temp 97.6 °F (36.4 °C)   Ht 162.6 cm (64.02\")   Wt 64.4 kg (142 lb)   LMP  (LMP Unknown)   SpO2 99%   BMI 24.36 kg/m²     Objective "   Physical Exam  Vitals reviewed.   Cardiovascular:      Rate and Rhythm: Normal rate.   Pulmonary:      Effort: No respiratory distress.      Breath sounds: No wheezing.   Neurological:      Mental Status: She is alert and oriented to person, place, and time.         Assessment & Plan   Diagnoses and all orders for this visit:    1. Hyperlipidemia LDL goal <100 (Primary)  -     Comprehensive Metabolic Panel; Future  -     Lipid Panel; Future  Continue Repatha. Statin intolerant.  2. Anxiety  -     ALPRAZolam (XANAX) 0.5 MG tablet; Take 1 tablet by mouth 2 (Two) Times a Day As Needed for Anxiety.  Dispense: 60 tablet; Refill: 2  Continue xanax and Wellbutrin.  Offered visit with psy but he declines  3. Essential hypertension  -     CBC & Differential; Future  BP continues to be up and down and hard to control. Sent to [facility] clinic for further eval and they have titrated micardis up to 80 mg po qd and Bysolic 10 mg po qd. We called the [facility]N clinic and they said she can call their office to discuss her recent home high readings and they will help adjust her meds.  Dimple hurtado MA will be calling her back today to let her know that she can call them today.   4. Stage 3 chronic kidney disease, unspecified whether stage 3a or 3b CKD (HCC)  She stopped seeing Nephrology on her own.  Concern with costs. Offered to have work with our  but she declined.   5. Hyperglycemia  -     Hemoglobin A1c; Future    6. Encounter for long-term (current) use of other medications  -     Compliance Drug Analysis, Ur - Urine, Clean Catch; Future  -     Compliance Drug Analysis, Ur - Urine, Clean Catch

## 2022-10-13 RX ORDER — TELMISARTAN AND HYDROCHLORTHIAZIDE 80; 12.5 MG/1; MG/1
1 TABLET ORAL DAILY
Qty: 90 TABLET | Refills: 1 | Status: SHIPPED | OUTPATIENT
Start: 2022-10-13 | End: 2022-10-17 | Stop reason: ALTCHOICE

## 2022-10-17 ENCOUNTER — TELEPHONE (OUTPATIENT)
Dept: CARDIOLOGY | Facility: CLINIC | Age: 72
End: 2022-10-17

## 2022-10-17 RX ORDER — HYDROCHLOROTHIAZIDE 25 MG/1
25 TABLET ORAL DAILY
Qty: 90 TABLET | Refills: 1 | Status: SHIPPED | OUTPATIENT
Start: 2022-10-17 | End: 2023-03-23

## 2022-10-17 RX ORDER — TELMISARTAN 80 MG/1
80 TABLET ORAL DAILY
Qty: 90 TABLET | Refills: 1 | Status: SHIPPED | OUTPATIENT
Start: 2022-10-17

## 2022-10-19 LAB
DRUGS UR: NORMAL
Lab: NORMAL

## 2022-12-13 DIAGNOSIS — F51.04 PSYCHOPHYSIOLOGICAL INSOMNIA: ICD-10-CM

## 2022-12-13 RX ORDER — ESZOPICLONE 2 MG/1
2 TABLET, FILM COATED ORAL NIGHTLY
Qty: 30 TABLET | Refills: 1 | Status: SHIPPED | OUTPATIENT
Start: 2022-12-13 | End: 2023-04-03

## 2022-12-13 NOTE — TELEPHONE ENCOUNTER
Last appointment: 10/12/2022  Next appointment: 2/16/2023    Raoul:   UDS: 10/12/2022  CSA: 10/17/2022    Last Refill: 10/10/2022  Quantity: 30  Refills: 1

## 2023-02-28 ENCOUNTER — TELEPHONE (OUTPATIENT)
Dept: INTERNAL MEDICINE | Facility: CLINIC | Age: 73
End: 2023-02-28
Payer: MEDICARE

## 2023-03-09 ENCOUNTER — OFFICE VISIT (OUTPATIENT)
Dept: CARDIOLOGY | Facility: CLINIC | Age: 73
End: 2023-03-09
Payer: MEDICARE

## 2023-03-09 VITALS
BODY MASS INDEX: 23.73 KG/M2 | HEART RATE: 82 BPM | DIASTOLIC BLOOD PRESSURE: 80 MMHG | HEIGHT: 64 IN | WEIGHT: 139 LBS | OXYGEN SATURATION: 99 % | SYSTOLIC BLOOD PRESSURE: 124 MMHG

## 2023-03-09 DIAGNOSIS — I10 ESSENTIAL HYPERTENSION: Primary | ICD-10-CM

## 2023-03-09 DIAGNOSIS — E78.5 HYPERLIPIDEMIA LDL GOAL <100: ICD-10-CM

## 2023-03-09 DIAGNOSIS — R01.1 HEART MURMUR: ICD-10-CM

## 2023-03-09 PROCEDURE — 1160F RVW MEDS BY RX/DR IN RCRD: CPT | Performed by: NURSE PRACTITIONER

## 2023-03-09 PROCEDURE — 1159F MED LIST DOCD IN RCRD: CPT | Performed by: NURSE PRACTITIONER

## 2023-03-09 PROCEDURE — 3079F DIAST BP 80-89 MM HG: CPT | Performed by: NURSE PRACTITIONER

## 2023-03-09 PROCEDURE — 3074F SYST BP LT 130 MM HG: CPT | Performed by: NURSE PRACTITIONER

## 2023-03-09 PROCEDURE — 99214 OFFICE O/P EST MOD 30 MIN: CPT | Performed by: NURSE PRACTITIONER

## 2023-03-23 RX ORDER — HYDROCHLOROTHIAZIDE 25 MG/1
TABLET ORAL
Qty: 90 TABLET | Refills: 1 | Status: SHIPPED | OUTPATIENT
Start: 2023-03-23

## 2023-04-02 DIAGNOSIS — F41.9 ANXIETY: ICD-10-CM

## 2023-04-03 ENCOUNTER — OFFICE VISIT (OUTPATIENT)
Dept: INTERNAL MEDICINE | Facility: CLINIC | Age: 73
End: 2023-04-03
Payer: MEDICARE

## 2023-04-03 ENCOUNTER — LAB (OUTPATIENT)
Dept: LAB | Facility: HOSPITAL | Age: 73
End: 2023-04-03
Payer: MEDICARE

## 2023-04-03 VITALS
HEIGHT: 64 IN | DIASTOLIC BLOOD PRESSURE: 72 MMHG | OXYGEN SATURATION: 100 % | SYSTOLIC BLOOD PRESSURE: 104 MMHG | HEART RATE: 86 BPM | WEIGHT: 136 LBS | BODY MASS INDEX: 23.22 KG/M2 | TEMPERATURE: 97.3 F

## 2023-04-03 DIAGNOSIS — R73.9 HYPERGLYCEMIA: ICD-10-CM

## 2023-04-03 DIAGNOSIS — I10 ESSENTIAL HYPERTENSION: ICD-10-CM

## 2023-04-03 DIAGNOSIS — F41.9 ANXIETY AND DEPRESSION: ICD-10-CM

## 2023-04-03 DIAGNOSIS — E78.5 HYPERLIPIDEMIA LDL GOAL <100: ICD-10-CM

## 2023-04-03 DIAGNOSIS — R53.83 OTHER FATIGUE: ICD-10-CM

## 2023-04-03 DIAGNOSIS — N64.4 BREAST PAIN, LEFT: ICD-10-CM

## 2023-04-03 DIAGNOSIS — F51.04 CHRONIC INSOMNIA: ICD-10-CM

## 2023-04-03 DIAGNOSIS — E78.5 HYPERLIPIDEMIA LDL GOAL <100: Primary | ICD-10-CM

## 2023-04-03 DIAGNOSIS — F32.A ANXIETY AND DEPRESSION: ICD-10-CM

## 2023-04-03 DIAGNOSIS — N18.30 STAGE 3 CHRONIC KIDNEY DISEASE, UNSPECIFIED WHETHER STAGE 3A OR 3B CKD: ICD-10-CM

## 2023-04-03 PROBLEM — F33.2 SEVERE EPISODE OF RECURRENT MAJOR DEPRESSIVE DISORDER, WITHOUT PSYCHOTIC FEATURES: Status: RESOLVED | Noted: 2019-06-06 | Resolved: 2023-04-03

## 2023-04-03 LAB
CHOLEST SERPL-MCNC: 238 MG/DL (ref 0–200)
HBA1C MFR BLD: 5.5 % (ref 4.8–5.6)
HDLC SERPL-MCNC: 91 MG/DL (ref 40–60)
LDLC SERPL CALC-MCNC: 113 MG/DL (ref 0–100)
LDLC/HDLC SERPL: 1.18 {RATIO}
TRIGL SERPL-MCNC: 200 MG/DL (ref 0–150)
TSH SERPL DL<=0.05 MIU/L-ACNC: 2.07 UIU/ML (ref 0.27–4.2)
VLDLC SERPL-MCNC: 34 MG/DL (ref 5–40)

## 2023-04-03 PROCEDURE — 83036 HEMOGLOBIN GLYCOSYLATED A1C: CPT

## 2023-04-03 PROCEDURE — 84443 ASSAY THYROID STIM HORMONE: CPT

## 2023-04-03 PROCEDURE — 80061 LIPID PANEL: CPT

## 2023-04-03 PROCEDURE — 80053 COMPREHEN METABOLIC PANEL: CPT

## 2023-04-03 RX ORDER — HYDROXYZINE PAMOATE 25 MG/1
25 CAPSULE ORAL NIGHTLY PRN
Qty: 30 CAPSULE | Refills: 0 | Status: SHIPPED | OUTPATIENT
Start: 2023-04-03 | End: 2023-04-05 | Stop reason: SDUPTHER

## 2023-04-03 RX ORDER — BUPROPION HYDROCHLORIDE 300 MG/1
300 TABLET ORAL DAILY
Qty: 90 TABLET | Refills: 1 | Status: SHIPPED | OUTPATIENT
Start: 2023-04-03

## 2023-04-03 RX ORDER — BUPROPION HYDROCHLORIDE 300 MG/1
TABLET ORAL
Qty: 90 TABLET | Refills: 1 | OUTPATIENT
Start: 2023-04-03

## 2023-04-03 NOTE — PROGRESS NOTES
"Subjective   Deidre Gomez is a 72 y.o. female.   Chief Complaint   Patient presents with   • Hyperlipidemia   • Hypertension   • Insomnia   • Anxiety       History of Present Illness   Here for f/u on chronic conditions: HTN, HLP, insomnia, anxiety and depression. HTN controlled with Micardis, Bystolic, and hCTZ. Insomnia  Not controlled with Lunesta. She did not tolerate Ambien.  Anxiety and depression doing better with Wellbutrin but still some anxiety.   Sees pain management for her chronic back pain.  Fatigue x years. Worst when she does not slleep.  Lost her dog in Nov.   The following portions of the patient's history were reviewed and updated as appropriate: allergies, current medications, past family history, past medical history, past social history, past surgical history and problem list.    Review of Systems   Respiratory: Negative for cough and shortness of breath.    Cardiovascular: Negative for chest pain and leg swelling.   Musculoskeletal: Positive for back pain.   Psychiatric/Behavioral: Positive for sleep disturbance. The patient is nervous/anxious.      /72   Pulse 86   Temp 97.3 °F (36.3 °C)   Ht 162.6 cm (64.02\")   Wt 61.7 kg (136 lb)   LMP  (LMP Unknown)   SpO2 100%   BMI 23.33 kg/m²     Objective   Physical Exam  Vitals reviewed.   Cardiovascular:      Rate and Rhythm: Normal rate and regular rhythm.   Pulmonary:      Effort: No respiratory distress.      Breath sounds: No wheezing.   Neurological:      Mental Status: She is alert and oriented to person, place, and time.   Psychiatric:         Behavior: Behavior normal.         Assessment & Plan   Diagnoses and all orders for this visit:    1. Hyperlipidemia LDL goal <100 (Primary)  -     Lipid Panel; Future  Statin intolerant  2. Essential hypertension  -     Comprehensive Metabolic Panel; Future  Continue Kbaetpgy56 mg po qd and HCTZ 25 mg po qd. She stopped the Bystolic on her own.   3. Stage 3 chronic kidney disease, " unspecified whether stage 3a or 3b CKD  Avoid NSAIDS and control BP.   4. Chronic insomnia  Failed Ambien and Lunesta.  Will add Vistaril for anxiety and see f helps her sleep  5. Anxiety and depression  Continue Wellbutrin  mg po qd  6. Hyperglycemia  -     Hemoglobin A1c; Future  Continue low carn diet.   7. Other fatigue  -   Recent CBC was done.  TSH; Future

## 2023-04-04 ENCOUNTER — TELEPHONE (OUTPATIENT)
Dept: INTERNAL MEDICINE | Facility: CLINIC | Age: 73
End: 2023-04-04
Payer: MEDICARE

## 2023-04-04 DIAGNOSIS — E87.5 HYPERKALEMIA: Primary | ICD-10-CM

## 2023-04-04 LAB
ALBUMIN SERPL-MCNC: 4.7 G/DL (ref 3.5–5.2)
ALBUMIN/GLOB SERPL: 1.7 G/DL
ALP SERPL-CCNC: 67 U/L (ref 39–117)
ALT SERPL W P-5'-P-CCNC: 10 U/L (ref 1–33)
ANION GAP SERPL CALCULATED.3IONS-SCNC: 11 MMOL/L (ref 5–15)
AST SERPL-CCNC: 20 U/L (ref 1–32)
BILIRUB SERPL-MCNC: 0.3 MG/DL (ref 0–1.2)
BUN SERPL-MCNC: 30 MG/DL (ref 8–23)
BUN/CREAT SERPL: 16.9 (ref 7–25)
CALCIUM SPEC-SCNC: 10 MG/DL (ref 8.6–10.5)
CHLORIDE SERPL-SCNC: 100 MMOL/L (ref 98–107)
CO2 SERPL-SCNC: 23 MMOL/L (ref 22–29)
CREAT SERPL-MCNC: 1.78 MG/DL (ref 0.57–1)
EGFRCR SERPLBLD CKD-EPI 2021: 30 ML/MIN/1.73
GLOBULIN UR ELPH-MCNC: 2.7 GM/DL
GLUCOSE SERPL-MCNC: 90 MG/DL (ref 65–99)
POTASSIUM SERPL-SCNC: 5.5 MMOL/L (ref 3.5–5.2)
PROT SERPL-MCNC: 7.4 G/DL (ref 6–8.5)
SODIUM SERPL-SCNC: 134 MMOL/L (ref 136–145)

## 2023-04-04 NOTE — TELEPHONE ENCOUNTER
Notified patient of results, patient verbalized understanding. Patient sees nephrologist already. I will fax lab results to Dr Looney. She has also requested her vitamin d checked as well next week.

## 2023-04-04 NOTE — TELEPHONE ENCOUNTER
----- Message from Aspen Salas DO sent at 4/4/2023  9:31 AM EDT -----  Your kidney function is decreased.  Avoid any products that contain ibuprofen. Needs kidney doctor. Potassium  high. Repeat level next week.

## 2023-04-05 ENCOUNTER — TELEPHONE (OUTPATIENT)
Dept: INTERNAL MEDICINE | Facility: CLINIC | Age: 73
End: 2023-04-05
Payer: MEDICARE

## 2023-04-05 DIAGNOSIS — F32.A ANXIETY AND DEPRESSION: ICD-10-CM

## 2023-04-05 DIAGNOSIS — F41.9 ANXIETY AND DEPRESSION: ICD-10-CM

## 2023-04-05 RX ORDER — HYDROXYZINE PAMOATE 25 MG/1
25 CAPSULE ORAL NIGHTLY PRN
Qty: 30 CAPSULE | Refills: 0 | Status: SHIPPED | OUTPATIENT
Start: 2023-04-05

## 2023-04-05 NOTE — TELEPHONE ENCOUNTER
SUSSY FROM [facility] IN [place] IS CALLING WITH A NEW RX REQUEST. SUSSY STATES PT HAS BEEN USING [facility] BUT SHE WOULD LIKE TO SWITCH AND START USING [facility] IN [place].     CAN A NEW RX FOR HYDROXYZINE 25 MG BE SENT TO [facility] FOR THE PT? PLEASE CALL THE PHARMACY WITH ANY QUESTIONS. [phone]

## 2023-04-12 ENCOUNTER — TELEPHONE (OUTPATIENT)
Dept: INTERNAL MEDICINE | Facility: CLINIC | Age: 73
End: 2023-04-12
Payer: MEDICARE

## 2023-04-12 ENCOUNTER — PATIENT MESSAGE (OUTPATIENT)
Dept: INTERNAL MEDICINE | Facility: CLINIC | Age: 73
End: 2023-04-12
Payer: MEDICARE

## 2023-04-12 RX ORDER — ZOLPIDEM TARTRATE 5 MG/1
5 TABLET ORAL NIGHTLY PRN
Qty: 30 TABLET | Refills: 1 | Status: SHIPPED | OUTPATIENT
Start: 2023-04-12

## 2023-04-12 NOTE — TELEPHONE ENCOUNTER
----- Message from Deidre Gomez sent at 4/12/2023 12:34 PM EDT -----  Regarding: Rx and Mammogram   Contact: [phone]  Dr. Salas,   The Hydroxyzine prescribed for insomnia and anxiety does not help at all for insomnia or for anxiety. I have not slept in weeks and am depressed and so tired, and in pain all the time.   I would like to go back to Ambien which is the only sleep Rx that has ever helped, and maybe try Klonipin for anxiety, which I took years ago, to see if it helps. School year is wrapping up and I’m trying to stay focused and calm but with my back hurting, lack of sleep and life in general, I feel terrible and am having problems staying on task.   Also, I know you said you would contact [facility] for a diagnostic breast exam, and I’ve not received any confirmation as to when I’m scheduled.   Francesca said she’d forward labs to Dr Looney, but I have not heard from his office either.   Thank you in advance for your attention and help with these problems.   Deidre Gomez   [phone].

## 2023-04-12 NOTE — TELEPHONE ENCOUNTER
Please advise on medication.    Labs were faxed to Dr Looney on 04/07/2023.  I have called mammography and they have the order and will be calling her to set up appointment.

## 2023-05-31 ENCOUNTER — HOSPITAL ENCOUNTER (OUTPATIENT)
Dept: MAMMOGRAPHY | Facility: HOSPITAL | Age: 73
Discharge: HOME | End: 2023-05-31
Admitting: INTERNAL MEDICINE

## 2023-05-31 ENCOUNTER — TRANSCRIBE ORDERS (OUTPATIENT)
Dept: MAMMOGRAPHY | Facility: HOSPITAL | Age: 73
End: 2023-05-31

## 2023-05-31 DIAGNOSIS — R92.8 ABNORMAL MAMMOGRAM: ICD-10-CM

## 2023-05-31 DIAGNOSIS — N64.4 BREAST PAIN, LEFT: ICD-10-CM

## 2023-05-31 PROCEDURE — G0279 TOMOSYNTHESIS, MAMMO: HCPCS

## 2023-05-31 PROCEDURE — 77066 DX MAMMO INCL CAD BI: CPT

## 2023-06-01 ENCOUNTER — TELEPHONE (OUTPATIENT)
Dept: INTERNAL MEDICINE | Facility: CLINIC | Age: 73
End: 2023-06-01

## 2023-06-01 NOTE — TELEPHONE ENCOUNTER
----- Message from Aspen Salas DO sent at 6/1/2023  7:34 AM EDT -----  Let know breast center will be calling her to jeff a biopsy for area of calcifications

## 2023-06-06 ENCOUNTER — TRANSCRIBE ORDERS (OUTPATIENT)
Dept: MAMMOGRAPHY | Facility: HOSPITAL | Age: 73
End: 2023-06-06
Payer: MEDICARE

## 2023-06-06 DIAGNOSIS — R92.8 ABNORMAL MAMMOGRAM: ICD-10-CM

## 2023-07-24 ENCOUNTER — TELEPHONE (OUTPATIENT)
Dept: MAMMOGRAPHY | Facility: HOSPITAL | Age: 73
End: 2023-07-24
Payer: MEDICARE

## 2023-07-24 NOTE — TELEPHONE ENCOUNTER
Returned patient call, notified  of pathology results and recommendations. Verbalizes understanding. States having some discomfort, using analgesics with some relief and discomfort is getting better. Denies signs and symptoms of infection.   Patient desires Dr CODY Martinez for surgical consult. Patient will be notified of appointment. Patient verbalized understanding.   Patient requested appointment information be left on her voice mail if she does not answer. Given office contact & location information. Told to bring photo ID, list of prescription & OTC medications, insurance information. May be accompanied by family member or friend for support. Reviewed what would be discussed at surgical consult visit, including review of imaging reports, explanation of pathology report and discussion of pros & cons of surgery or deciding not to have surgery. Encouraged patient to call back or contact surgeon's office with further questions. Patient verbalized understanding.

## 2023-07-25 ENCOUNTER — TELEPHONE (OUTPATIENT)
Dept: INTERNAL MEDICINE | Facility: CLINIC | Age: 73
End: 2023-07-25
Payer: MEDICARE

## 2023-07-25 ENCOUNTER — TELEPHONE (OUTPATIENT)
Dept: MAMMOGRAPHY | Facility: HOSPITAL | Age: 73
End: 2023-07-25
Payer: MEDICARE

## 2023-07-25 NOTE — TELEPHONE ENCOUNTER
Patient requested that appointment details be left on her VM. In a previous call, patient given office contact & location information. Told to bring photo ID, list of prescription & OTC medications, insurance information. May be accompanied by family member or friend for support. Patient notified of surgical consult appointment with Dr CODY Martinez on 8.31.23 @ 6641. Encouraged patient to call back or contact surgeon's office with further questions.

## 2023-07-25 NOTE — TELEPHONE ENCOUNTER
----- Message from Aspen Salas DO sent at 7/25/2023 10:17 AM EDT -----  Make sure the breast center has called her and set her with surgeon  for surgical incision biopsy to obtain more tissue

## 2023-07-26 ENCOUNTER — TELEPHONE (OUTPATIENT)
Dept: MAMMOGRAPHY | Facility: HOSPITAL | Age: 73
End: 2023-07-26
Payer: MEDICARE

## 2023-07-26 NOTE — TELEPHONE ENCOUNTER
Patient called in & left VM message that she received my VM message with appointment details for surgical consult with Dr Martinez.

## 2023-08-02 NOTE — PROGRESS NOTES
Subjective:     Encounter Date:08/10/2023      Patient ID: Deidre Gomez is a 73 y.o.  white female from [place], [place], retired teacher at [employer].     REFERRING PHYSICIAN: Aspen Salas DO  NEPHROLOGIST: Constance Looney MD.    Chief Complaint:   Chief Complaint   Patient presents with    Hypertension    Dizziness     Problem List:  Hypertension  Intolerant to olmesartan, reports side effects with almost all blood pressure medications attempted, clonidine ineffective, says diuretics did not help, amlodipine with hair loss  Apparently acceptable renal artery duplex with her nephrologist in 2021-  24-hour ambulatory blood monitor July 2022 which showed average awake blood pressure was 143/79 mmHg, heart rate 62 bpm, average asleep blood pressure was 136/71 mmHg, 64 bpm, and average overall blood pressure 142/70 mmHg, 62 bpm.  Echocardiogram 7/15/2022: LVEF 68%, RVSP less than 35 mmHg, no significant structural or functional valvular abnormalities demonstrated, diastolic function normal  Residual class I symptoms with normal ECG July 2022, CCS class 0 angina.NYHA class I-II SOB on exertion March 2023, August 2023 with normal ECG  Hyperlipidemia; declined statins, ASCVD 10-year risk is 17.6 %, 9.6 % if treated, wants to think about PCSK9 inhibitor August 2023  Anxiety and depression  Chronic kidney disease stage III with apparent abnormal acceptable renal ultrasound in 2021- data deficit  Systolic heart murmur  Intermittent palpitations  Lumbar back pain  ADHD  RLS  Osteoporosis  At risk for sleep apnea/chronic insomnia with remote sleep study-data deficit, acceptable outpatient sleep oximetry monitor July 2022 with no recommendations for nocturnal oxygen.  Former tobacco use; 1 pack/day x 50 years, quit in 2019  History of TIA x2  Abnormal mammogram with apparent negative biopsy-data deficit  Dizziness  Surgical history:  Appendectomy  Breast biopsy/cyst aspiration  Cosmetic  surgery  Hysterectomy  Tubal ligation  Breast biopsy    Allergies   Allergen Reactions    Amlodipine Other (See Comments)     Hair loss    Codeine Nausea Only    Olmesartan     Cephalexin Rash    Penicillins Rash       Current Outpatient Medications   Medication Instructions    acetaminophen (TYLENOL 8 HOUR) 650 mg, Oral, Every 8 Hours PRN    buPROPion XL (WELLBUTRIN XL) 300 mg, Oral, Daily    Diclofenac Sodium (VOLTAREN) 1 % gel gel Apply 4 g topically to the appropriate area as directed As Needed.    Ergocalciferol (VITAMIN D2 PO) Vitamin D2 1,250 mcg (50,000 unit) capsule    hydroCHLOROthiazide (HYDRODIURIL) 25 MG tablet TAKE ONE TABLET BY MOUTH DAILY. TAKE WITH TELMISARTAN.    HYDROcodone-acetaminophen (NORCO) 7.5-325 MG per tablet 1 tablet, Oral, As Needed    hydrOXYzine pamoate (VISTARIL) 25 mg, Oral, Nightly PRN    multivitamin (THERAGRAN) tablet tablet Take 1 tablet by mouth Daily.    telmisartan (MICARDIS) 80 mg, Oral, Daily, Take with HCTZ    zolpidem (AMBIEN) 5 mg, Oral, Nightly PRN         HISTORY OF PRESENT ILLNESS:  The patient is here for a 5-month follow-up.  She was previously supposed to start on a PCSK9 inhibitor but then she discontinued it.  She wants to still think about starting a PCSK9 inhibitor but does not want to give herself the injections.  I informed her that there is an autoinjector option and she wanted to have repeat labs with her primary care physician in a couple months.  She wore a 24-hour ambulatory blood monitor which showed average awake blood pressure was 143/79 mmHg, heart rate 62 bpm, average asleep blood pressure was 136/71 mmHg, 64 bpm, and average overall blood pressure 142/70 mmHg, 62 bpm.  She denies any chest pain, shortness of breath, or syncope.  She sometimes notices palpitations but she says that they are no different from what she has had for years.  Remotely she was on Bystolic but did not want to continue taking this medication because she did not feel like it  "was effective.  She has had intermittent dizzy episodes for a few seconds.  She notices these more when she is standing and has not had any while she is walking.  She denies having the sensation if she goes from sitting to standing positions.  She denies any vertigo.  She says that sometimes she will have these episodes a couple days in a row and then she will not have any for a couple weeks.  She has noticed this change since her hydrochlorothiazide came from her mail order pharmacy instead of [facility].  She has not been checking her blood pressure whenever she feels dizzy but will start doing so.  She is trying to stay hydrated and says that she urinates frequently.  She has insomnia and has difficulties sleeping.  She has a lot of back pain which inhibits her activities and she feels like this is her main issue.  She had a recent breast biopsy but she was supposed to have multiple biopsies but was unable to tolerate any more than one.  Her physician wanted her to come back under anesthesia and have the rest of the biopsies and she is going to discuss this with her upcoming appointment with her surgeon.  She feels like she has a lot of stress and anxiety and is trying to adapt to changes in her health with age.  She is supposed to see her nephrologist soon.    ROS   All other systems reviewed and otherwise negative.      ECG 12 Lead    Date/Time: 8/10/2023 1:44 PM  Performed by: Sherrie Apodaca APRN  Authorized by: Sherrie Apodaca APRN   Rhythm comments: Normal sinus rhythm, normal ECG, 71 bpm, QRS 70 ms, QTc 421 ms,  ms, no change from last ECG in July 2022           Objective:       Vitals:    08/10/23 1302 08/10/23 1305 08/10/23 1343   BP: 138/82 145/93 130/76   BP Location: Left arm Left arm Right arm   Patient Position: Sitting Standing Sitting   Pulse: 70 80    SpO2: 98%     Weight: 62.1 kg (136 lb 12.8 oz)     Height: 160 cm (63\")       Body mass index is 24.23 kg/mý.  Last weight March 2023 was 139 " pounds  Constitutional:       Appearance: Healthy appearance. Not in distress.   Neck:      Vascular: No JVR. JVD normal.   Pulmonary:      Effort: Pulmonary effort is normal.      Breath sounds: Normal breath sounds. No wheezing. No rhonchi. No rales.   Chest:      Chest wall: Not tender to palpatation.   Cardiovascular:      PMI at left midclavicular line. Normal rate. Regular rhythm. Normal S1. Normal S2.       Murmurs: There is a grade 1/6 systolic murmur at the URSB.      No gallop.  No click. No rub.   Pulses:     Dorsalis pedis: 1+ bilaterally.     Posterior tibial: 1+ bilaterally.  Edema:     Peripheral edema absent.   Abdominal:      General: Bowel sounds are normal.      Palpations: Abdomen is soft.      Tenderness: There is no abdominal tenderness.   Musculoskeletal: Normal range of motion.         General: No tenderness. Skin:     General: Skin is warm and dry.   Neurological:      General: No focal deficit present.      Mental Status: Alert and oriented to person, place and time.         Lab Review:   Lab Results   Component Value Date    GLUCOSE 90 04/03/2023    BUN 30 (H) 04/03/2023    CREATININE 1.78 (H) 04/03/2023    EGFRIFNONA 51 (L) 11/19/2021    EGFRIFAFRI 58 (L) 11/19/2021    BCR 16.9 04/03/2023    CO2 23.0 04/03/2023    CALCIUM 10.0 04/03/2023    PROTENTOTREF 6.4 10/12/2022    ALBUMIN 4.7 04/03/2023    LABIL2 2.6 10/12/2022    AST 20 04/03/2023    ALT 10 04/03/2023       Lab Results   Component Value Date    WBC 6.80 10/12/2022    HGB 14.3 10/12/2022    HCT 41.6 10/12/2022    MCV 87.4 10/12/2022     10/12/2022       Lab Results   Component Value Date    HGBA1C 5.50 04/03/2023       Lab Results   Component Value Date    TSH 2.070 04/03/2023       Lab Results   Component Value Date    CHOL 238 (H) 04/03/2023     Lab Results   Component Value Date    TRIG 200 (H) 04/03/2023    TRIG 225 (H) 10/12/2022     Lab Results   Component Value Date    HDL 91 (H) 04/03/2023    HDL 81 (H) 10/12/2022      Lab Results   Component Value Date     (H) 04/03/2023     (H) 10/12/2022           Lab Results   Component Value Date    BNP 19 05/24/2014       Advance Care Planning   ACP discussion was held with the patient during this visit. Patient does not have an advance directive, declines further assistance.            Assessment:    The patient has had intermittent dizzy episodes, particularly since getting her hydrochlorothiazide from mail order pharmacy instead of [facility].  She will start monitoring her blood pressure more frequently, particularly if she has dizzy spells.  She has intermittent palpitations as well so I will have her wear an E patch to assess for any arrhythmias, PAF, or pauses.  I encouraged the patient to start a PCSK9 inhibitor in view of her ASCVD 10-year risk of 17.6%, 9.6% with treatment.  She declined statins and wants to think about PCSK9 inhibitor and get repeat labs with her PCP in a couple months before deciding.  Blood pressure is controlled.     Diagnosis Plan   1. Heart murmur  Acceptable echocardiogram July 2022      2. Essential hypertension  Controlled, continue current cardiac medications      3. Hyperlipidemia LDL goal <100  Abnormal lipid panel April 2023, ASCVD 10-year risk is 17.6%, 9.6% with treatment, patient declined statin, would recommend PCSK9 inhibitor but the patient wants to get new labs with her primary care physician soon and wants to think about it.  She would prefer the autoinjector versus giving herself the injection   4.  Dizziness: E patch          Plan:         Patient to continue current medications and close follow up with the above providers.  Tentative cardiology follow up in March 2024 in the hypertension clinic or patient may return sooner PRN.  Patient declined statin, would recommend PCSK9 inhibitor but the patient wants to get new labs with her primary care physician soon and wants to think about it.  She would prefer the autoinjector versus  giving herself the injection  E patch  Patient to start monitoring her blood pressure more frequently    Electronically signed by WELLINGTON Holguin, 08/10/23, 1:48 PM EDT.

## 2023-08-10 ENCOUNTER — OFFICE VISIT (OUTPATIENT)
Dept: CARDIOLOGY | Facility: CLINIC | Age: 73
End: 2023-08-10
Payer: MEDICARE

## 2023-08-10 VITALS
WEIGHT: 136.8 LBS | HEIGHT: 63 IN | DIASTOLIC BLOOD PRESSURE: 76 MMHG | HEART RATE: 80 BPM | BODY MASS INDEX: 24.24 KG/M2 | SYSTOLIC BLOOD PRESSURE: 130 MMHG | OXYGEN SATURATION: 98 %

## 2023-08-10 DIAGNOSIS — R01.1 HEART MURMUR: Primary | ICD-10-CM

## 2023-08-10 DIAGNOSIS — I10 ESSENTIAL HYPERTENSION: ICD-10-CM

## 2023-08-10 DIAGNOSIS — E78.5 HYPERLIPIDEMIA LDL GOAL <100: ICD-10-CM

## 2023-08-10 DIAGNOSIS — R42 DIZZINESS: ICD-10-CM

## 2023-08-10 PROCEDURE — 1160F RVW MEDS BY RX/DR IN RCRD: CPT | Performed by: NURSE PRACTITIONER

## 2023-08-10 PROCEDURE — 1159F MED LIST DOCD IN RCRD: CPT | Performed by: NURSE PRACTITIONER

## 2023-08-10 PROCEDURE — 99214 OFFICE O/P EST MOD 30 MIN: CPT | Performed by: NURSE PRACTITIONER

## 2023-08-10 PROCEDURE — 3075F SYST BP GE 130 - 139MM HG: CPT | Performed by: NURSE PRACTITIONER

## 2023-08-10 PROCEDURE — 93000 ELECTROCARDIOGRAM COMPLETE: CPT | Performed by: NURSE PRACTITIONER

## 2023-08-10 PROCEDURE — 3078F DIAST BP <80 MM HG: CPT | Performed by: NURSE PRACTITIONER

## 2023-08-22 NOTE — TELEPHONE ENCOUNTER
Caller: Deidre Gomez    Relationship: Self    Best call back number:     Requested Prescriptions:   Requested Prescriptions     Pending Prescriptions Disp Refills    zolpidem (Ambien) 5 MG tablet 30 tablet 1     Sig: Take 1 tablet by mouth At Night As Needed for Sleep.   10 MG REQUESTED     Pharmacy where request should be sent: [facility] [id number] [place] [id number] - [phone]  - [phone] FX     Last office visit with prescribing clinician: 4/3/2023   Last telemedicine visit with prescribing clinician: Visit date not found   Next office visit with prescribing clinician: 10/13/2023     Additional details provided by patient  THE PATIENT ONLY AS 2 PILLS LEFT OF THE 5 MG. 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  THE PATIENT CANNOT STAY ASLEEP.    Does the patient have less than a 3 day supply:  [x] Yes  [] No    Would you like a call back once the refill request has been completed: [x] Yes [] No    If the office needs to give you a call back, can they leave a voicemail: [x] Yes [] No    Sheila Sepulveda Rep   08/22/23 16:24 EDT

## 2023-08-23 ENCOUNTER — TELEPHONE (OUTPATIENT)
Dept: INTERNAL MEDICINE | Facility: CLINIC | Age: 73
End: 2023-08-23

## 2023-08-23 RX ORDER — ZOLPIDEM TARTRATE 5 MG/1
5 TABLET ORAL NIGHTLY PRN
Qty: 30 TABLET | Refills: 1 | Status: SHIPPED | OUTPATIENT
Start: 2023-08-23

## 2023-08-23 NOTE — TELEPHONE ENCOUNTER
Caller: Deidre Gomez    Relationship to patient: Self    Best call back number: [phone]    Chief complaint: NEEDS OFFICE VISIT TO DISCUSS MEDICATION AND BIOPSEY ALSO NEED TO CHANGE TIME OF WELLNESS VISIT        Requested date: ANYDAY AS LONG AS IT 3:30 PM OR LATER         Additional notes:THE PATIENT WANTS TO COME IN FOR AN OFFICE VISIT WITH DOCTOR GTZ TO TALK ABOUT MEDICATION AND HER BIOPSEY BUT DUE TO WORK SHE CAN ONLY COME IN AFTER AT 3:30 OR LATER THE PATIENT WOULD LIKE TO KNOW IF THAT CAN BE DONE THE PATIENT ALSO NEEDS TO CHANGE HER WELLNESS VISIT APPOINTMENT TO SOMETIME 3:30 OR LATER

## 2023-08-28 DIAGNOSIS — F41.9 ANXIETY: ICD-10-CM

## 2023-08-28 RX ORDER — BUPROPION HYDROCHLORIDE 300 MG/1
TABLET ORAL
Qty: 90 TABLET | Refills: 1 | OUTPATIENT
Start: 2023-08-28

## 2023-09-01 ENCOUNTER — TRANSCRIBE ORDERS (OUTPATIENT)
Dept: MAMMOGRAPHY | Facility: HOSPITAL | Age: 73
End: 2023-09-01
Payer: MEDICARE

## 2023-09-01 DIAGNOSIS — F32.A ANXIETY AND DEPRESSION: ICD-10-CM

## 2023-09-01 DIAGNOSIS — R92.1 BREAST CALCIFICATION SEEN ON MAMMOGRAM: Primary | ICD-10-CM

## 2023-09-01 DIAGNOSIS — F41.9 ANXIETY AND DEPRESSION: ICD-10-CM

## 2023-09-03 RX ORDER — HYDROXYZINE PAMOATE 25 MG/1
CAPSULE ORAL
Qty: 30 CAPSULE | Refills: 0 | Status: SHIPPED | OUTPATIENT
Start: 2023-09-03

## 2023-09-11 ENCOUNTER — LAB (OUTPATIENT)
Dept: LAB | Facility: HOSPITAL | Age: 73
End: 2023-09-11
Payer: MEDICARE

## 2023-09-11 ENCOUNTER — OFFICE VISIT (OUTPATIENT)
Dept: INTERNAL MEDICINE | Facility: CLINIC | Age: 73
End: 2023-09-11
Payer: MEDICARE

## 2023-09-11 VITALS
SYSTOLIC BLOOD PRESSURE: 134 MMHG | WEIGHT: 135 LBS | BODY MASS INDEX: 23.92 KG/M2 | OXYGEN SATURATION: 97 % | TEMPERATURE: 97.4 F | HEIGHT: 63 IN | DIASTOLIC BLOOD PRESSURE: 83 MMHG | HEART RATE: 74 BPM

## 2023-09-11 DIAGNOSIS — F51.04 CHRONIC INSOMNIA: ICD-10-CM

## 2023-09-11 DIAGNOSIS — E55.9 VITAMIN D DEFICIENCY: ICD-10-CM

## 2023-09-11 DIAGNOSIS — E87.5 HYPERKALEMIA: ICD-10-CM

## 2023-09-11 DIAGNOSIS — I10 ESSENTIAL HYPERTENSION: ICD-10-CM

## 2023-09-11 DIAGNOSIS — I10 ESSENTIAL HYPERTENSION: Primary | ICD-10-CM

## 2023-09-11 PROCEDURE — 1159F MED LIST DOCD IN RCRD: CPT | Performed by: INTERNAL MEDICINE

## 2023-09-11 PROCEDURE — 3075F SYST BP GE 130 - 139MM HG: CPT | Performed by: INTERNAL MEDICINE

## 2023-09-11 PROCEDURE — 1160F RVW MEDS BY RX/DR IN RCRD: CPT | Performed by: INTERNAL MEDICINE

## 2023-09-11 PROCEDURE — 3079F DIAST BP 80-89 MM HG: CPT | Performed by: INTERNAL MEDICINE

## 2023-09-11 PROCEDURE — 99214 OFFICE O/P EST MOD 30 MIN: CPT | Performed by: INTERNAL MEDICINE

## 2023-09-11 RX ORDER — ACYCLOVIR 400 MG/1
TABLET ORAL
COMMUNITY
Start: 2023-07-05

## 2023-09-11 RX ORDER — ZOLPIDEM TARTRATE 10 MG/1
10 TABLET ORAL NIGHTLY PRN
Qty: 30 TABLET | Refills: 1 | Status: SHIPPED | OUTPATIENT
Start: 2023-09-11

## 2023-09-11 RX ORDER — SODIUM FLUORIDE 6 MG/ML
PASTE, DENTIFRICE DENTAL
COMMUNITY
Start: 2023-07-29

## 2023-09-11 NOTE — PROGRESS NOTES
"Subjective   Deidre Gomez is a 73 y.o. female.   Chief Complaint   Patient presents with    Hypertension    Insomnia       History of Present Illness   F/u on chronic conditions: JHTN and insomnia. HTN controlled with Micardis.   Rash on her back since childhood. It comes and goes. Has seen derm for this and given valtrex. Breast biopsy was negative for malignancy but  recommended surgical excision.  She went and saw Dr. LEYVA  and he recommend that she follow up with him in 6 months with repeat mammogram sent to him.  Awaiting hearing aids.   Has appointment with Dr. Burgess, for pain management.   The following portions of the patient's history were reviewed and updated as appropriate: allergies, current medications, past family history, past medical history, past social history, past surgical history, and problem list.    Review of Systems   Constitutional:  Positive for fatigue (chornic). Negative for diaphoresis.   Respiratory:  Negative for cough and shortness of breath.    Cardiovascular:  Negative for chest pain and leg swelling.   Gastrointestinal:  Negative for constipation, diarrhea, nausea and vomiting.   Musculoskeletal:  Positive for arthralgias and myalgias.   Psychiatric/Behavioral:  Positive for sleep disturbance. Negative for confusion. The patient is not hyperactive.    /83   Pulse 74   Temp 97.4 °F (36.3 °C)   Ht 160 cm (63\")   Wt 61.2 kg (135 lb)   LMP  (LMP Unknown)   SpO2 97%   BMI 23.91 kg/m²     Objective   Physical Exam  Vitals reviewed.   Cardiovascular:      Rate and Rhythm: Normal rate and regular rhythm.   Pulmonary:      Effort: No respiratory distress.      Breath sounds: No wheezing.   Neurological:      Mental Status: She is alert and oriented to person, place, and time.   Psychiatric:         Mood and Affect: Mood normal.         Behavior: Behavior normal.       Assessment & Plan   Diagnoses and all orders for this visit:    1. Essential hypertension (Primary)  - "     Comprehensive Metabolic Panel; Future  -     Lipid Panel; Future  -     CBC & Differential; Future  Continue micardis 80 mg po qd and HCTZ 25 mg po qd  2. Chronic insomnia  -     zolpidem (AMBIEN) 10 MG tablet; Take 1 tablet by mouth At Night As Needed for Sleep.  Dispense: 30 tablet; Refill: 1  Continue Ambien 10 mg po qhs  3. Vitamin D deficiency  -     Vitamin D,25-Hydroxy; Future

## 2023-09-12 ENCOUNTER — TELEPHONE (OUTPATIENT)
Dept: INTERNAL MEDICINE | Facility: CLINIC | Age: 73
End: 2023-09-12
Payer: MEDICARE

## 2023-09-12 LAB
25(OH)D3+25(OH)D2 SERPL-MCNC: 84.1 NG/ML (ref 30–100)
ALBUMIN SERPL-MCNC: 4.7 G/DL (ref 3.5–5.2)
ALBUMIN/GLOB SERPL: 2.5 G/DL
ALP SERPL-CCNC: 64 U/L (ref 39–117)
ALT SERPL-CCNC: 10 U/L (ref 1–33)
AST SERPL-CCNC: 22 U/L (ref 1–32)
BASOPHILS # BLD AUTO: 0.03 10*3/MM3 (ref 0–0.2)
BASOPHILS NFR BLD AUTO: 0.4 % (ref 0–1.5)
BILIRUB SERPL-MCNC: 0.3 MG/DL (ref 0–1.2)
BUN SERPL-MCNC: 23 MG/DL (ref 8–23)
BUN/CREAT SERPL: 17.2 (ref 7–25)
CALCIUM SERPL-MCNC: 10.4 MG/DL (ref 8.6–10.5)
CHLORIDE SERPL-SCNC: 101 MMOL/L (ref 98–107)
CHOLEST SERPL-MCNC: 211 MG/DL (ref 0–200)
CO2 SERPL-SCNC: 23.3 MMOL/L (ref 22–29)
CREAT SERPL-MCNC: 1.34 MG/DL (ref 0.57–1)
EGFRCR SERPLBLD CKD-EPI 2021: 42 ML/MIN/1.73
EOSINOPHIL # BLD AUTO: 0.15 10*3/MM3 (ref 0–0.4)
EOSINOPHIL NFR BLD AUTO: 2 % (ref 0.3–6.2)
ERYTHROCYTE [DISTWIDTH] IN BLOOD BY AUTOMATED COUNT: 14.1 % (ref 12.3–15.4)
GLOBULIN SER CALC-MCNC: 1.9 GM/DL
GLUCOSE SERPL-MCNC: 91 MG/DL (ref 65–99)
HCT VFR BLD AUTO: 39.4 % (ref 34–46.6)
HDLC SERPL-MCNC: 70 MG/DL (ref 40–60)
HGB BLD-MCNC: 13.3 G/DL (ref 12–15.9)
IMM GRANULOCYTES # BLD AUTO: 0.02 10*3/MM3 (ref 0–0.05)
IMM GRANULOCYTES NFR BLD AUTO: 0.3 % (ref 0–0.5)
LDLC SERPL CALC-MCNC: 91 MG/DL (ref 0–100)
LYMPHOCYTES # BLD AUTO: 3.05 10*3/MM3 (ref 0.7–3.1)
LYMPHOCYTES NFR BLD AUTO: 40.6 % (ref 19.6–45.3)
MCH RBC QN AUTO: 29.6 PG (ref 26.6–33)
MCHC RBC AUTO-ENTMCNC: 33.8 G/DL (ref 31.5–35.7)
MCV RBC AUTO: 87.8 FL (ref 79–97)
MONOCYTES # BLD AUTO: 0.8 10*3/MM3 (ref 0.1–0.9)
MONOCYTES NFR BLD AUTO: 10.7 % (ref 5–12)
NEUTROPHILS # BLD AUTO: 3.46 10*3/MM3 (ref 1.7–7)
NEUTROPHILS NFR BLD AUTO: 46 % (ref 42.7–76)
NRBC BLD AUTO-RTO: 0 /100 WBC (ref 0–0.2)
PLATELET # BLD AUTO: 159 10*3/MM3 (ref 140–450)
POTASSIUM SERPL-SCNC: 7.2 MMOL/L (ref 3.5–5.2)
PROT SERPL-MCNC: 6.6 G/DL (ref 6–8.5)
RBC # BLD AUTO: 4.49 10*6/MM3 (ref 3.77–5.28)
SODIUM SERPL-SCNC: 136 MMOL/L (ref 136–145)
TRIGL SERPL-MCNC: 309 MG/DL (ref 0–150)
VLDLC SERPL CALC-MCNC: 50 MG/DL (ref 5–40)
WBC # BLD AUTO: 7.51 10*3/MM3 (ref 3.4–10.8)

## 2023-09-12 NOTE — TELEPHONE ENCOUNTER
Called by the lab with critical potassium of 7.2. They noted that this was not a hemolyzed specimen. Immediately called patient who answered and confirmed her name and date of birth. I informed her of her high potassium as well as normal range, risk of high potassium mainly death from cardiac arrhythmia. She was recommended to go immediately to the ED. She stated that she has had high potassium in the past due to eating bananas and she had been recently eating more bananas including one she had just finished. I discussed with her that past labs have shown much lower values around 5.2. She stated that she could not drop everything and go to the ED tonight. Best she could do was repeat a lab in the morning and send a message tonight to her cardiologist. I repeatedly expressed my concern and the seriousness of hyperkalemia to her however she refused to go to the ED and expressed understanding of the risk.

## 2023-09-13 ENCOUNTER — DOCUMENTATION (OUTPATIENT)
Dept: CARDIOLOGY | Facility: CLINIC | Age: 73
End: 2023-09-13
Payer: MEDICARE

## 2023-09-13 ENCOUNTER — HOSPITAL ENCOUNTER (EMERGENCY)
Facility: HOSPITAL | Age: 73
Discharge: HOME | End: 2023-09-13
Attending: EMERGENCY MEDICINE
Payer: MEDICARE

## 2023-09-13 VITALS
WEIGHT: 135 LBS | OXYGEN SATURATION: 96 % | SYSTOLIC BLOOD PRESSURE: 145 MMHG | HEART RATE: 77 BPM | TEMPERATURE: 98 F | RESPIRATION RATE: 16 BRPM | DIASTOLIC BLOOD PRESSURE: 85 MMHG | BODY MASS INDEX: 23.92 KG/M2 | HEIGHT: 63 IN

## 2023-09-13 DIAGNOSIS — E87.5 HYPERKALEMIA: Primary | ICD-10-CM

## 2023-09-13 LAB
BUN BLDA-MCNC: 25 MG/DL
CALCIUM BLD QL: 1.19 MG/DL
CHLORIDE BLDA-SCNC: 102 MMOL/L (ref 98–109)
CREAT BLDA-MCNC: 1.6 MG/DL
HCT VFR BLDA CALC: 40 % (ref 38–51)
HGB BLDA-MCNC: 13.6 G/DL (ref 12–17)
POTASSIUM BLDA-SCNC: 3.6 MMOL/L (ref 3.5–4.9)
QT INTERVAL: 404 MS
QTC INTERVAL: 451 MS
SODIUM BLD-SCNC: 139 MMOL/L (ref 138–146)

## 2023-09-13 PROCEDURE — 93005 ELECTROCARDIOGRAM TRACING: CPT | Performed by: EMERGENCY MEDICINE

## 2023-09-13 PROCEDURE — 80047 BASIC METABLC PNL IONIZED CA: CPT

## 2023-09-13 PROCEDURE — 85014 HEMATOCRIT: CPT

## 2023-09-13 PROCEDURE — 99282 EMERGENCY DEPT VISIT SF MDM: CPT

## 2023-09-13 RX ORDER — HYDROCHLOROTHIAZIDE 25 MG/1
TABLET ORAL
Qty: 90 TABLET | Refills: 1 | OUTPATIENT
Start: 2023-09-13

## 2023-09-13 NOTE — TELEPHONE ENCOUNTER
Spoke with patient and re-emphasized need to go to ED for evaluation.  Cardiology also called patient and advised her of same recommendations.  Patient states she will go ahead and go to ED.  Called ED and spoke with Zuri, Charge Nurse and gave report of patient's arrival and reason.

## 2023-09-13 NOTE — TELEPHONE ENCOUNTER
Spoke with patient and advised to go to ED and patient states that she does not want to go to ED and states that she feels the foods that she as eaten recently has caused this elevation.  States she is not experiencing any chest pain or doesn't feel that heart rate is abnormal.  States she had recent EKG and Holter monitor and was normal.  Has sent cardiology a message as well.  Please advise.

## 2023-09-13 NOTE — ED PROVIDER NOTES
Subjective   History of Present Illness    3 days ago was mowing grass and had some fatigue and myalgias.  Had PCP appt two days ago and had labs.  Was called today about high potassium and told to come to ED for it.    Her symptoms from 3 days ago have resolved and not returned.  She denies chest pain, palpitations, dizziness.  Feels at baseline today.    Has had hyperkalemia in the past.  Is on a thiazide diuretic, not on a potassium supplement.  She believes she eats too many potassium containing foods.    History provided by:  Patient    Review of Systems   Respiratory:  Negative for shortness of breath.    Cardiovascular:  Negative for chest pain and palpitations.   Neurological:  Negative for dizziness.   All other systems reviewed and are negative.    Past Medical History:   Diagnosis Date    ADHD (attention deficit hyperactivity disorder) 1990’s    Prescribed drug    Allergic Since childhood    Pills, cortisone,inhaler,  nose drop,    Anemia In my 40’s    Anxiety Always    Pills , antidepressants, anti anxiety    Arthritis     Cataract In my mid 50's    Depression Always    Drugs    Glaucoma     Headache Since childhood    Excedrin    Heart murmur 2022    HL (hearing loss) Since my 50’s    Hyperlipidemia     Hypertension     Left breast mass 09/14/2016    Leg numbness     Low back pain In my 60's    spinal & drugs    Osteopenia     Renal insufficiency     Scoliosis 70 yrs old    Stroke 1997?    Tobacco abuse counseling     Visual impairment In my 40's    glasses       Allergies   Allergen Reactions    Amlodipine Other (See Comments)     Hair loss    Codeine Nausea Only    Olmesartan     Cephalexin Rash    Penicillins Rash       Past Surgical History:   Procedure Laterality Date    APPENDECTOMY      BREAST BIOPSY      BREAST CYST ASPIRATION      COLONOSCOPY  2017    COSMETIC SURGERY  50 yrs old    HYSTERECTOMY      OOPHORECTOMY      TUBAL ABDOMINAL LIGATION  35 yrs old       Family History   Problem Relation  Age of Onset    Cancer Mother         multiple myeloma    Anxiety disorder Mother     Depression Mother     Early death Mother         63 yrs old    Mental illness Mother         Depression/Anxiety    Alcohol abuse Father     Diabetes Father     Heart disease Father     Hyperlipidemia Father     Stroke Father     Vision loss Father     Hypertension Father     Hyperlipidemia Sister     Kidney disease Sister     Heart disease Sister     Cancer Brother         melanoma    Depression Brother     Early death Brother         63 yrs old    Hyperlipidemia Brother     Heart disease Brother     Heart disease Brother     Arthritis Paternal Grandmother     Allergies Other     Depression Other     Diabetes Other     Heart disease Other     Hypertension Other     Skin cancer Other     Bleeding Disorder Other     Stroke Other     Breast cancer Neg Hx         no family hx       Social History     Socioeconomic History    Marital status:    Tobacco Use    Smoking status: Former     Packs/day: 1.00     Years: 50.00     Pack years: 50.00     Types: Cigarettes     Start date: 1965     Quit date: 2019     Years since quittin.6    Smokeless tobacco: Never    Tobacco comments:     Smoked on and off  2-5 yrs at a time.   Vaping Use    Vaping Use: Never used   Substance and Sexual Activity    Alcohol use: Never    Drug use: Never    Sexual activity: Not Currently     Partners: Male     Birth control/protection: Surgical, Tubal ligation, Birth control pill, Hysterectomy     Comment: Was on birth control pill for about 15 yrs           Objective   Physical Exam  Vitals and nursing note reviewed.   Constitutional:       General: She is not in acute distress.     Appearance: Normal appearance. She is not ill-appearing.   HENT:      Head: Normocephalic and atraumatic.   Eyes:      General: No scleral icterus.        Right eye: No discharge.         Left eye: No discharge.      Conjunctiva/sclera: Conjunctivae normal.    Cardiovascular:      Rate and Rhythm: Normal rate and regular rhythm.      Heart sounds: No murmur heard.  Pulmonary:      Effort: Pulmonary effort is normal. No respiratory distress.   Musculoskeletal:         General: No swelling or deformity.      Cervical back: Normal range of motion and neck supple.   Skin:     General: Skin is dry.      Findings: No rash.   Neurological:      General: No focal deficit present.      Mental Status: She is alert and oriented to person, place, and time. Mental status is at baseline.   Psychiatric:         Mood and Affect: Mood normal.         Behavior: Behavior normal.         Thought Content: Thought content normal.       Procedures           ED Course  ED Course as of 09/13/23 2125   Wed Sep 13, 2023   1150 EKG NSR without changes of hyperkalemia. [BW]      ED Course User Index  [BW] Vincent Salamanca MD         I reviewed notes from office 9/11/23 and multiple notes today relating to the lab result.  It appears the labs were checked as part of routine health maintenance and not for acute complaint.     Potassium normal, EKG NSR. Reassurance.  Likely falsely elevated.  Discussed with pt that her PCP had no choice but to send her here in light of reported value.                             Medical Decision Making  Problems Addressed:  Hyperkalemia: complicated acute illness or injury    Amount and/or Complexity of Data Reviewed  External Data Reviewed: labs and notes.  Labs: ordered. Decision-making details documented in ED Course.  ECG/medicine tests: ordered and independent interpretation performed. Decision-making details documented in ED Course.     Details: my read EKG NSR        Final diagnoses:   Hyperkalemia       ED Disposition  ED Disposition       ED Disposition   Discharge    Condition   Stable    Comment   --               Aspen Salas DO  [address] [place] [id number] [phone]      As scheduled         Medication List      No changes were  made to your prescriptions during this visit.            Vincent Salamanca MD  09/13/23 0755

## 2023-09-13 NOTE — TELEPHONE ENCOUNTER
Left a message for patient to return call to clinic regarding potassium levels.  Per Dr Salas patient needs to go to ED.

## 2023-09-18 ENCOUNTER — TELEPHONE (OUTPATIENT)
Dept: CASE MANAGEMENT | Facility: OTHER | Age: 73
End: 2023-09-18
Payer: MEDICARE

## 2023-09-18 NOTE — TELEPHONE ENCOUNTER
Attempted to reach patient as an ER follow up call. Patient may have been at work and inadvertently answered her phone but did not take the call.

## 2023-09-20 ENCOUNTER — HOSPITAL ENCOUNTER (OUTPATIENT)
Dept: GENERAL RADIOLOGY | Facility: HOSPITAL | Age: 73
Discharge: HOME | End: 2023-09-20
Admitting: ANESTHESIOLOGY
Payer: MEDICARE

## 2023-09-20 ENCOUNTER — TRANSCRIBE ORDERS (OUTPATIENT)
Dept: GENERAL RADIOLOGY | Facility: HOSPITAL | Age: 73
End: 2023-09-20
Payer: MEDICARE

## 2023-09-20 DIAGNOSIS — M25.552 PAIN IN JOINT OF LEFT HIP: Primary | ICD-10-CM

## 2023-09-20 DIAGNOSIS — M25.552 PAIN IN JOINT OF LEFT HIP: ICD-10-CM

## 2023-09-20 PROCEDURE — 73502 X-RAY EXAM HIP UNI 2-3 VIEWS: CPT

## 2023-09-21 RX ORDER — TELMISARTAN 80 MG/1
TABLET ORAL
Qty: 90 TABLET | Refills: 1 | Status: SHIPPED | OUTPATIENT
Start: 2023-09-21

## 2023-10-13 ENCOUNTER — OFFICE VISIT (OUTPATIENT)
Dept: INTERNAL MEDICINE | Facility: CLINIC | Age: 73
End: 2023-10-13
Payer: MEDICARE

## 2023-10-13 ENCOUNTER — LAB (OUTPATIENT)
Dept: LAB | Facility: HOSPITAL | Age: 73
End: 2023-10-13
Payer: MEDICARE

## 2023-10-13 VITALS
HEIGHT: 63 IN | DIASTOLIC BLOOD PRESSURE: 60 MMHG | SYSTOLIC BLOOD PRESSURE: 116 MMHG | OXYGEN SATURATION: 100 % | BODY MASS INDEX: 24.27 KG/M2 | HEART RATE: 93 BPM | WEIGHT: 137 LBS

## 2023-10-13 DIAGNOSIS — F41.9 ANXIETY AND DEPRESSION: ICD-10-CM

## 2023-10-13 DIAGNOSIS — Z13.29 THYROID DISORDER SCREEN: ICD-10-CM

## 2023-10-13 DIAGNOSIS — R73.9 HYPERGLYCEMIA: ICD-10-CM

## 2023-10-13 DIAGNOSIS — F32.A ANXIETY AND DEPRESSION: ICD-10-CM

## 2023-10-13 DIAGNOSIS — I10 ESSENTIAL HYPERTENSION: ICD-10-CM

## 2023-10-13 DIAGNOSIS — E78.5 HYPERLIPIDEMIA LDL GOAL <100: ICD-10-CM

## 2023-10-13 DIAGNOSIS — Z00.00 MEDICARE ANNUAL WELLNESS VISIT, SUBSEQUENT: Primary | ICD-10-CM

## 2023-10-13 DIAGNOSIS — N18.30 STAGE 3 CHRONIC KIDNEY DISEASE, UNSPECIFIED WHETHER STAGE 3A OR 3B CKD: ICD-10-CM

## 2023-10-13 DIAGNOSIS — F51.04 CHRONIC INSOMNIA: ICD-10-CM

## 2023-10-13 PROBLEM — G56.03 BILATERAL CARPAL TUNNEL SYNDROME: Status: RESOLVED | Noted: 2018-11-06 | Resolved: 2023-10-13

## 2023-10-13 PROBLEM — G25.81 RESTLESS LEGS SYNDROME (RLS): Status: RESOLVED | Noted: 2017-03-28 | Resolved: 2023-10-13

## 2023-10-13 PROBLEM — R42 DIZZINESS: Status: RESOLVED | Noted: 2023-08-10 | Resolved: 2023-10-13

## 2023-10-13 LAB
ALBUMIN SERPL-MCNC: 4.7 G/DL (ref 3.5–5.2)
ALBUMIN/GLOB SERPL: 2 G/DL
ALP SERPL-CCNC: 60 U/L (ref 39–117)
ALT SERPL W P-5'-P-CCNC: 8 U/L (ref 1–33)
ANION GAP SERPL CALCULATED.3IONS-SCNC: 12 MMOL/L (ref 5–15)
AST SERPL-CCNC: 19 U/L (ref 1–32)
BILIRUB SERPL-MCNC: 0.3 MG/DL (ref 0–1.2)
BUN SERPL-MCNC: 24 MG/DL (ref 8–23)
BUN/CREAT SERPL: 18 (ref 7–25)
CALCIUM SPEC-SCNC: 10.3 MG/DL (ref 8.6–10.5)
CHLORIDE SERPL-SCNC: 102 MMOL/L (ref 98–107)
CO2 SERPL-SCNC: 25 MMOL/L (ref 22–29)
CREAT SERPL-MCNC: 1.33 MG/DL (ref 0.57–1)
EGFRCR SERPLBLD CKD-EPI 2021: 42.3 ML/MIN/1.73
GLOBULIN UR ELPH-MCNC: 2.4 GM/DL
GLUCOSE SERPL-MCNC: 96 MG/DL (ref 65–99)
HBA1C MFR BLD: 5.8 % (ref 4.8–5.6)
POTASSIUM SERPL-SCNC: 4.8 MMOL/L (ref 3.5–5.2)
PROT SERPL-MCNC: 7.1 G/DL (ref 6–8.5)
SODIUM SERPL-SCNC: 139 MMOL/L (ref 136–145)
TSH SERPL DL<=0.05 MIU/L-ACNC: 3.26 UIU/ML (ref 0.27–4.2)

## 2023-10-13 PROCEDURE — 84443 ASSAY THYROID STIM HORMONE: CPT

## 2023-10-13 PROCEDURE — 80053 COMPREHEN METABOLIC PANEL: CPT

## 2023-10-13 PROCEDURE — 83036 HEMOGLOBIN GLYCOSYLATED A1C: CPT

## 2023-10-13 RX ORDER — ALPRAZOLAM 1 MG/1
1 TABLET ORAL NIGHTLY PRN
COMMUNITY

## 2023-10-13 NOTE — PROGRESS NOTES
The ABCs of the Annual Wellness Visit  Subsequent Medicare Wellness Visit    Subjective    Deidre Gomez is a 73 y.o. female who presents for a Subsequent Medicare Wellness Visit.    The following portions of the patient's history were reviewed and   updated as appropriate: allergies, current medications, past family history, past medical history, past social history, past surgical history, and problem list.    Compared to one year ago, the patient feels her physical   health is worse.    Compared to one year ago, the patient feels her mental   health is the same.    Recent Hospitalizations:  She was not admitted to the hospital during the last year.       Current Medical Providers:  Patient Care Team:  Aspen Salas DO as PCP - General  Aspen Salas DO as PCP - Family Medicine  Sherrie Apodaca APRN as Nurse Practitioner (Cardiology)    Outpatient Medications Prior to Visit   Medication Sig Dispense Refill    acetaminophen (Tylenol 8 Hour) 650 MG 8 hr tablet Take 1 tablet by mouth Every 8 (Eight) Hours As Needed for Mild Pain . 60 tablet 0    acyclovir (ZOVIRAX) 400 MG tablet       ALPRAZolam (XANAX) 1 MG tablet Take 1 tablet by mouth At Night As Needed for Anxiety.      buPROPion XL (WELLBUTRIN XL) 300 MG 24 hr tablet Take 1 tablet by mouth Daily. 90 tablet 1    Diclofenac Sodium (VOLTAREN) 1 % gel gel Apply 4 g topically to the appropriate area as directed As Needed.      Ergocalciferol (VITAMIN D2 PO) Vitamin D2 1,250 mcg (50,000 unit) capsule      fluocinonide (LIDEX) 0.05 % cream Apply 1 application  topically to the appropriate area as directed 2 (Two) Times a Day.      hydroCHLOROthiazide (HYDRODIURIL) 25 MG tablet TAKE ONE TABLET BY MOUTH DAILY. TAKE WITH TELMISARTAN. 90 tablet 1    HYDROcodone-acetaminophen (NORCO) 7.5-325 MG per tablet Take 1 tablet by mouth As Needed.      hydrOXYzine pamoate (VISTARIL) 25 MG capsule TAKE 1 CAPSULE  AT NIGHT AS NEEDED FOR ANXIETY. 30 capsule 0     "multivitamin (THERAGRAN) tablet tablet Take 1 tablet by mouth Daily.      mupirocin (BACTROBAN) 2 % ointment Apply 1 application  topically to the appropriate area as directed Daily.      PreviDent 5000 Booster Plus 1.1 % paste       telmisartan (MICARDIS) 80 MG tablet TAKE 1 TABLET EVERY DAY 90 tablet 1    zolpidem (AMBIEN) 10 MG tablet Take 1 tablet by mouth At Night As Needed for Sleep. 30 tablet 1     No facility-administered medications prior to visit.       Opioid medication/s are on active medication list.  and I have evaluated her active treatment plan and pain score trends (see table).  Vitals:    10/13/23 1005   PainSc:   5   PainLoc: Hip  Comment: LEFT     I have reviewed the chart for potential of high risk medication and harmful drug interactions in the elderly.          Aspirin is not on active medication list.  Aspirin use is not indicated based on review of current medical condition/s. Risk of harm outweighs potential benefits.  .    Patient Active Problem List   Diagnosis    Gastroesophageal reflux disease    Hyperlipidemia LDL goal <100    Essential hypertension    Chronic insomnia    Anxiety and depression    Multinodular thyroid    Age-related osteoporosis without current pathological fracture    Suspected sleep apnea    Breast calcification seen on mammogram    Chronic kidney disease, stage 3    Heart murmur     Advance Care Planning   Advance Care Planning     Advance Directive is not on file.  ACP discussion was held with the patient during this visit. Patient does not have an advance directive, information provided.     Objective    Vitals:    10/13/23 1005   BP: 116/60   Pulse: 93   SpO2: 100%   Weight: 62.1 kg (137 lb)   Height: 160 cm (63\")   PainSc:   5   PainLoc: Hip  Comment: LEFT     Estimated body mass index is 24.27 kg/mý as calculated from the following:    Height as of this encounter: 160 cm (63\").    Weight as of this encounter: 62.1 kg (137 lb).    BMI is within normal " parameters. No other follow-up for BMI required.      Does the patient have evidence of cognitive impairment? No    Lab Results   Component Value Date    CHLPL 211 (H) 2023    TRIG 309 (H) 2023    HDL 70 (H) 2023    LDL 91 2023    VLDL 50 (H) 2023        HEALTH RISK ASSESSMENT    Smoking Status:  Social History     Tobacco Use   Smoking Status Former    Packs/day: 1.00    Years: 50.00    Additional pack years: 0.00    Total pack years: 50.00    Types: Cigarettes    Start date: 1965    Quit date: 2019    Years since quittin.6   Smokeless Tobacco Never   Tobacco Comments    Smoked on and off  2-5 yrs at a time.     Alcohol Consumption:  Social History     Substance and Sexual Activity   Alcohol Use Never     Fall Risk Screen:    VINCENT Fall Risk Assessment was completed, and patient is at LOW risk for falls.Assessment completed on:10/13/2023    Depression Screening:      10/13/2023    10:15 AM   PHQ-2/PHQ-9 Depression Screening   Little Interest or Pleasure in Doing Things 0-->not at all   Feeling Down, Depressed or Hopeless 0-->not at all   PHQ-9: Brief Depression Severity Measure Score 0       Health Habits and Functional and Cognitive Screening:      10/13/2023    10:15 AM   Functional & Cognitive Status   Do you have difficulty preparing food and eating? No   Do you have difficulty bathing yourself, getting dressed or grooming yourself? No   Do you have difficulty using the toilet? No   Do you have difficulty moving around from place to place? No   Do you have trouble with steps or getting out of a bed or a chair? No   Current Diet Well Balanced Diet   Dental Exam Up to date   Eye Exam Up to date   Exercise (times per week) 7 times per week   Current Exercises Include Walking   Do you need help using the phone?  No   Are you deaf or do you have serious difficulty hearing?  Yes   Do you need help to go to places out of walking distance? No   Do you need help shopping? No    Do you need help preparing meals?  No   Do you need help with housework?  No   Do you need help with laundry? No   Do you need help taking your medications? No   Do you need help managing money? No   Do you ever drive or ride in a car without wearing a seat belt? No   Have you felt unusual stress, anger or loneliness in the last month? No   If you need help, do you have trouble finding someone available to you? Yes   Have you been bothered in the last four weeks by sexual problems? No   Do you have difficulty concentrating, remembering or making decisions? Yes       Age-appropriate Screening Schedule:  Refer to the list below for future screening recommendations based on patient's age, sex and/or medical conditions. Orders for these recommended tests are listed in the plan section. The patient has been provided with a written plan.    Health Maintenance   Topic Date Due    COVID-19 Vaccine (1) Never done    TDAP/TD VACCINES (1 - Tdap) Never done    ZOSTER VACCINE (1 of 2) Never done    INFLUENZA VACCINE  08/01/2023    DXA SCAN  10/07/2023    Pneumococcal Vaccine 65+ (1 - PCV) 04/03/2024 (Originally 7/22/2015)    LUNG CANCER SCREENING  04/03/2024 (Originally 7/22/2000)    HEMOGLOBIN A1C  04/03/2024    LIPID PANEL  09/11/2024    ANNUAL WELLNESS VISIT  10/13/2024    COLORECTAL CANCER SCREENING  06/28/2027    HEPATITIS C SCREENING  Completed    DIABETIC FOOT EXAM  Discontinued    MAMMOGRAM  Discontinued    PAP SMEAR  Discontinued    DIABETIC EYE EXAM  Discontinued    URINE MICROALBUMIN  Discontinued                  CMS Preventative Services Quick Reference  Risk Factors Identified During Encounter  None Identified  The above risks/problems have been discussed with the patient.  Pertinent information has been shared with the patient in the After Visit Summary.  An After Visit Summary and PPPS were made available to the patient.    Follow Up:   Next Medicare Wellness visit to be scheduled in 1 year.       Additional  "E&M Note during same encounter follows:  Patient has multiple medical problems which are significant and separately identifiable that require additional work above and beyond the Medicare Wellness Visit.      Chief Complaint  Medicare Wellness-subsequent, Hyperlipidemia, Hypertension, Insomnia, and Anxiety    Subjective        HPI  Deidre Gomez is also being seen today for Here for f/u on chronic conditions: HTN, HLP, CKD,insomnia, and anxiety and depression. HTN controlled with  Micardis and HCTZ. HLP sill not take statin. CKD stable. Insomnia controlled with Ambien. Anxiety and depression controlled with Wellbutrin.     Review of Systems   Constitutional:  Positive for fatigue. Negative for diaphoresis and fever.   HENT:  Negative for sinus pressure.    Eyes:  Negative for redness.   Respiratory:  Negative for cough and wheezing.    Cardiovascular:  Negative for chest pain and leg swelling.   Genitourinary:  Negative for dysuria and urgency.   Musculoskeletal:  Positive for arthralgias and back pain (Sees Dr. Kemp now).   Neurological:  Negative for seizures and weakness.   Psychiatric/Behavioral:  The patient is nervous/anxious. The patient is not hyperactive.        Objective   Vital Signs:  /60   Pulse 93   Ht 160 cm (63\")   Wt 62.1 kg (137 lb)   SpO2 100%   BMI 24.27 kg/mý     Physical Exam  Vitals reviewed.   Cardiovascular:      Rate and Rhythm: Normal rate and regular rhythm.   Pulmonary:      Effort: No respiratory distress.      Breath sounds: No wheezing.   Neurological:      Mental Status: She is alert and oriented to person, place, and time.   Psychiatric:         Behavior: Behavior normal.                         Assessment and Plan   Diagnoses and all orders for this visit:    1. Medicare annual wellness visit, subsequent (Primary)  Done today  2. Hyperlipidemia LDL goal <100  Continue low cholesterol diet. Declines statin  3. Essential hypertension  -     Comprehensive " Metabolic Panel; Future  Continue Micarids 80 mg po qd and HCTZ 25 mg po qd  4. Stage 3 chronic kidney disease, unspecified whether stage 3a or 3b CKD  Avoid NSAIDS  5. Anxiety and depression  -     Ambulatory Referral to Behavioral Health  Continue Wellbutrin  mg po qd  6. Chronic insomnia  Continue Ambien 10 mg po qhs  7. Thyroid disorder screen  -     TSH; Future  Has hx goiter, declines us thyroid  8. Hyperglycemia  -     Hemoglobin A1c; Future  Low carb diet           Follow Up   Return in about 4 months (around 2/13/2024).  Patient was given instructions and counseling regarding her condition or for health maintenance advice. Please see specific information pulled into the AVS if appropriate.

## 2023-11-06 RX ORDER — ALPRAZOLAM 1 MG/1
1 TABLET ORAL NIGHTLY PRN
Qty: 30 TABLET | Refills: 1 | Status: SHIPPED | OUTPATIENT
Start: 2023-11-06

## 2023-11-10 ENCOUNTER — TELEPHONE (OUTPATIENT)
Dept: INTERNAL MEDICINE | Facility: CLINIC | Age: 73
End: 2023-11-10
Payer: MEDICARE

## 2023-11-10 DIAGNOSIS — Z78.0 POSTMENOPAUSE: Primary | ICD-10-CM

## 2023-11-10 NOTE — TELEPHONE ENCOUNTER
Caller: Deidre Gomez    Relationship: Self    Best call back number: [phone]     What orders are you requesting (i.e. lab or imaging): DEXA SCAN     In what timeframe would the patient need to come in: ASAP     Where will you receive your lab/imaging services: WOULD LIKE ORDER PRINTED SO SHE CAN TAKE IT SOMEWHERE SHE CHOOSES     Additional notes: GETTING Zyrra MESSAGES THAT THIS IS DUE PLEASE CALL TO DISCUSS

## 2023-11-21 DIAGNOSIS — Z78.0 POSTMENOPAUSE: Primary | ICD-10-CM

## 2023-12-06 PROBLEM — R73.03 PREDIABETES: Status: ACTIVE | Noted: 2023-12-06

## 2023-12-06 NOTE — PROGRESS NOTES
Subjective:     Encounter Date:12/13/2023      Patient ID: Deidre Gomez is a 73 y.o.  white female from [place], [place], retired teacher at [employer].     REFERRING PHYSICIAN: Aspen Salas DO  NEPHROLOGIST: Constance Looney MD.    Chief Complaint:   Chief Complaint   Patient presents with    Heart Murmur    Essential hypertension     Problem List:  Hypertension  Intolerant to olmesartan, reports side effects with almost all blood pressure medications attempted, clonidine ineffective, says diuretics did not help, amlodipine with hair loss  Apparently acceptable renal artery duplex with her nephrologist in 2021-  24-hour ambulatory blood monitor July 2022 which showed average awake blood pressure was 143/79 mmHg, heart rate 62 bpm, average asleep blood pressure was 136/71 mmHg, 64 bpm, and average overall blood pressure 142/70 mmHg, 62 bpm.  Echocardiogram 7/15/2022: LVEF 68%, RVSP less than 35 mmHg, no significant structural or functional valvular abnormalities demonstrated, diastolic function normal  Residual class I symptoms with normal ECG July 2022, CCS class 0 angina.NYHA class I-II SOB on exertion March 2023, August 2023 with normal ECG  Hyperlipidemia; declined statins, ASCVD 10-year risk is 17.6 %, 9.6 % if treated, wants to think about PCSK9 inhibitor August 2023, December 2023  Anxiety and depression  Chronic kidney disease stage III with apparent abnormal acceptable renal ultrasound in 2021- data deficit  Systolic heart murmur  Intermittent palpitations  Lumbar back pain  ADHD  RLS  Osteoporosis  At risk for sleep apnea/chronic insomnia with remote sleep study-data deficit, acceptable outpatient sleep oximetry monitor July 2022 with no recommendations for nocturnal oxygen.  Former tobacco use; 1 pack/day x 50 years, quit in 2019  History of TIA x2  Abnormal mammogram with apparent negative biopsy-data deficit  Dizziness, Holter monitor benign August 2023 showing heart rate ranged between  57-126bpm with average 77bpm. There was no evidence of atrial fibrillation/flutter, ventricular tachycardia, pauses, or heart block. Episode of SVT that only lasted for 12 beats.   [facility] ED visit September 2023 for hyperkalemia  Prediabetes; hemoglobin A1c 5.8% October 2023  Surgical history:  Appendectomy  Breast biopsy/cyst aspiration  Cosmetic surgery  Hysterectomy  Tubal ligation  Breast biopsy     Allergies   Allergen Reactions    Amlodipine Other (See Comments)     Hair loss    Codeine Nausea Only    Olmesartan     Cephalexin Rash    Penicillins Rash       Current Outpatient Medications   Medication Instructions    acetaminophen (TYLENOL 8 HOUR) 650 mg, Oral, Every 8 Hours PRN    acyclovir (ZOVIRAX) 400 MG tablet Take 1 tablet by mouth As Needed.    ALPRAZolam (XANAX) 1 mg, Oral, Nightly PRN    buPROPion XL (WELLBUTRIN XL) 300 mg, Oral, Daily    Diclofenac Sodium (VOLTAREN) 1 % gel gel Apply 4 g topically to the appropriate area as directed As Needed.    Ergocalciferol (VITAMIN D2 PO) Vitamin D2 1,250 mcg (50,000 unit) capsule    fluocinonide (LIDEX) 0.05 % cream 1 application , Topical, 2 Times Daily    hydroCHLOROthiazide (HYDRODIURIL) 25 MG tablet TAKE ONE TABLET BY MOUTH DAILY. TAKE WITH TELMISARTAN.    HYDROcodone-acetaminophen (NORCO) 7.5-325 MG per tablet 1 tablet, Oral, As Needed    hydrOXYzine pamoate (VISTARIL) 25 MG capsule TAKE 1 CAPSULE  AT NIGHT AS NEEDED FOR ANXIETY.    multivitamin (THERAGRAN) tablet tablet Take 1 tablet by mouth Daily.    mupirocin (BACTROBAN) 2 % ointment 1 application , Topical, Daily    PreviDent 5000 Booster Plus 1.1 % paste     telmisartan (MICARDIS) 80 MG tablet TAKE 1 TABLET EVERY DAY    zolpidem (AMBIEN) 10 mg, Oral, Nightly PRN         HISTORY OF PRESENT ILLNESS:  The patient is here for 4-month follow-up and is a hypertension clinic patient.  She was supposed to be on PCSK9 inhibitor but discontinued it and wanted to think about restarting it because she did not want  to give herself the injections at her last office visit. She wore a 24-hour ambulatory blood monitor which showed average awake blood pressure was 143/79 mmHg, heart rate 62 bpm, average asleep blood pressure was 136/71 mmHg, 64 bpm, and average overall blood pressure 142/70 mmHg, 62 bpm.   Remotely she was on Bystolic but did not want to continue taking this medication because she did not feel like it was effective.   She wore a Holter monitor that was rather benign in August 2023 showing heart rate ranged between 57-126bpm with average 77bpm. There was no evidence of atrial fibrillation/flutter, ventricular tachycardia, pauses, or heart block.  Episode of SVT that only lasted for 12 beats.  In the interim the patient had hyperkalemia with potassium 7.2 and went to the ED. The patient had her potassium rechecked and it was back to normal.  She says that if she eats potassium rich foods then she would get cramping in her legs.  She has a lot of back pain and goes for back injections.  Her anesthesiologist thought she may benefit from a rhizotomy but she has not had this yet.  The patient says that she also has osteoporosis.  She had a breast biopsy that apparently was benign and she saw a surgeon who told her she did not have to have any more biopsies-data deficit.  The patient still does not want to be on PCSK9 inhibitor and wants to research this more.  She feels like her back pain and her anxiety cause her blood pressure to go up.  She has been on multiple antidepressants/antianxiety medication in the past and Wellbutrin worked the best for her.  She has not yet seen a mental health counselor and was advised that she would benefit from this.  The patient feels antisocial and says that she prefers to staying at home with her dog and her parrot.  She says that she is fine when she is at home but then when she gets out in the public she gets more anxiety.  She also has insomnia.  After the first of the year she is  "going to possibly pursue seeing a counselor.  She finds that her medical problems and getting older because her stress.  She denies any chest pain, shortness of breath, presyncope, or syncope.  She occasionally has lightheadedness if she bends over and stands up too quickly.  Occasionally she will feel a skipped beat.  The patient says her blood pressure is normally around 130-135 systolic if she is at home.  She only drinks 1 cup of coffee in the morning.    ROS   All other systems reviewed and otherwise negative.    Procedures       Objective:       Vitals:    12/13/23 1337 12/13/23 1340 12/13/23 1424   BP: 144/85 143/81 130/60   BP Location: Left arm Left arm Right arm   Patient Position: Sitting Standing Sitting   Pulse: 77 81    SpO2: 98%     Weight: 59.6 kg (131 lb 6.4 oz)     Height: 160 cm (63\")       Body mass index is 23.28 kg/m².  Last weight August 2023 was 136 pounds  Constitutional:       Appearance: Healthy appearance. Not in distress.   Neck:      Vascular: No JVR. JVD normal.   Pulmonary:      Effort: Pulmonary effort is normal.      Breath sounds: Normal breath sounds. No wheezing. No rhonchi. No rales.   Chest:      Chest wall: Not tender to palpatation.   Cardiovascular:      PMI at left midclavicular line. Normal rate. Regular rhythm. Normal S1. Normal S2.       Murmurs: There is a grade 1/6 systolic murmur at the URSB.      No gallop.  No click. No rub.   Pulses:     Intact distal pulses.   Edema:     Peripheral edema absent.   Abdominal:      General: Bowel sounds are normal.      Palpations: Abdomen is soft.      Tenderness: There is no abdominal tenderness.   Musculoskeletal: Normal range of motion.         General: No tenderness. Skin:     General: Skin is warm and dry.   Neurological:      General: No focal deficit present.      Mental Status: Alert and oriented to person, place and time.           Lab Review:   Lab Results   Component Value Date    GLUCOSE 96 10/13/2023    BUN 24 (H) " 10/13/2023    CREATININE 1.33 (H) 10/13/2023    EGFRIFNONA 51 (L) 11/19/2021    EGFRIFAFRI 58 (L) 11/19/2021    BCR 18.0 10/13/2023    CO2 25.0 10/13/2023    CALCIUM 10.3 10/13/2023    PROTENTOTREF 6.6 09/11/2023    ALBUMIN 4.7 10/13/2023    LABIL2 2.5 09/11/2023    AST 19 10/13/2023    ALT 8 10/13/2023   Potassium 4.8 on 10/13/2023    Lab Results   Component Value Date    WBC 7.51 09/11/2023    HGB 13.6 09/13/2023    HCT 40 09/13/2023    MCV 87.8 09/11/2023     09/11/2023       Lab Results   Component Value Date    HGBA1C 5.80 (H) 10/13/2023       Lab Results   Component Value Date    TSH 3.260 10/13/2023       Lab Results   Component Value Date    CHOL 238 (H) 04/03/2023     Lab Results   Component Value Date    TRIG 309 (H) 09/11/2023    TRIG 200 (H) 04/03/2023     Lab Results   Component Value Date    HDL 70 (H) 09/11/2023    HDL 91 (H) 04/03/2023     Lab Results   Component Value Date    LDL 91 09/11/2023     (H) 04/03/2023           Lab Results   Component Value Date    BNP 19 05/24/2014     Advance Care Planning   ACP discussion was held with the patient during this visit. Patient does not have an advance directive, declines further assistance.              Assessment:      I encouraged the patient to start a PCSK9 inhibitor in view of her ASCVD 10-year risk of 17.6%, 9.6% with treatment.  She declined statins and wants to think about PCSK9 inhibitor and research Repatha or Praluent before deciding.  She had a benign Holter monitor in August 2023.  She will continue monitoring her blood pressure at home.  The patient would benefit from seeing a mental health counselor to help her with anxiety.     Diagnosis Plan   1. Essential hypertension  Controlled, continue telmisartan, hydrochlorothiazide      2. Hyperlipidemia LDL goal <100  Abnormal lipid panel September 2023, I encouraged the patient to start a PCSK9 inhibitor in view of her ASCVD 10-year risk of 17.6%, 9.6% with treatment.  She declined  statins and wants to think about PCSK9 inhibitor and research Repatha or Praluent before deciding.       3. Heart murmur  Acceptable echocardiogram July 2022      4. Prediabetes  Physical activity as tolerated, heart healthy diet             Plan:         Patient to continue current medications and close follow up with the above providers.  Tentative cardiology follow up in June 2024 in the hypertension clinic or patient may return sooner PRN.  I encouraged the patient to start a PCSK9 inhibitor in view of her ASCVD 10-year risk of 17.6%, 9.6% with treatment.  She declined statins and wants to think about PCSK9 inhibitor and research Repatha or Praluent before deciding.        Electronically signed by WELLINGTON Holguin, 12/13/23, 2:33 PM EST.

## 2023-12-13 ENCOUNTER — OFFICE VISIT (OUTPATIENT)
Dept: CARDIOLOGY | Facility: CLINIC | Age: 73
End: 2023-12-13
Payer: MEDICARE

## 2023-12-13 VITALS
HEIGHT: 63 IN | SYSTOLIC BLOOD PRESSURE: 130 MMHG | DIASTOLIC BLOOD PRESSURE: 60 MMHG | OXYGEN SATURATION: 98 % | HEART RATE: 81 BPM | WEIGHT: 131.4 LBS | BODY MASS INDEX: 23.28 KG/M2

## 2023-12-13 DIAGNOSIS — I10 ESSENTIAL HYPERTENSION: Primary | ICD-10-CM

## 2023-12-13 DIAGNOSIS — R01.1 HEART MURMUR: ICD-10-CM

## 2023-12-13 DIAGNOSIS — R73.03 PREDIABETES: ICD-10-CM

## 2023-12-13 DIAGNOSIS — E78.5 HYPERLIPIDEMIA LDL GOAL <100: ICD-10-CM

## 2023-12-13 PROCEDURE — 99214 OFFICE O/P EST MOD 30 MIN: CPT | Performed by: NURSE PRACTITIONER

## 2023-12-13 PROCEDURE — 1160F RVW MEDS BY RX/DR IN RCRD: CPT | Performed by: NURSE PRACTITIONER

## 2023-12-13 PROCEDURE — 3078F DIAST BP <80 MM HG: CPT | Performed by: NURSE PRACTITIONER

## 2023-12-13 PROCEDURE — 3075F SYST BP GE 130 - 139MM HG: CPT | Performed by: NURSE PRACTITIONER

## 2023-12-13 PROCEDURE — 1159F MED LIST DOCD IN RCRD: CPT | Performed by: NURSE PRACTITIONER

## 2023-12-26 DIAGNOSIS — F51.04 CHRONIC INSOMNIA: ICD-10-CM

## 2023-12-26 RX ORDER — ZOLPIDEM TARTRATE 10 MG/1
10 TABLET ORAL NIGHTLY PRN
Qty: 30 TABLET | Refills: 1 | Status: SHIPPED | OUTPATIENT
Start: 2023-12-26

## 2023-12-27 RX ORDER — HYDROCHLOROTHIAZIDE 25 MG/1
25 TABLET ORAL DAILY
Qty: 90 TABLET | Refills: 1 | Status: SHIPPED | OUTPATIENT
Start: 2023-12-27

## 2023-12-29 DIAGNOSIS — F41.9 ANXIETY: ICD-10-CM

## 2023-12-29 RX ORDER — BUPROPION HYDROCHLORIDE 300 MG/1
300 TABLET ORAL DAILY
Qty: 90 TABLET | Refills: 0 | Status: SHIPPED | OUTPATIENT
Start: 2023-12-29

## 2024-02-13 ENCOUNTER — OFFICE VISIT (OUTPATIENT)
Dept: INTERNAL MEDICINE | Facility: CLINIC | Age: 74
End: 2024-02-13
Payer: MEDICARE

## 2024-02-13 VITALS
HEIGHT: 63 IN | HEART RATE: 80 BPM | WEIGHT: 133.6 LBS | BODY MASS INDEX: 23.67 KG/M2 | OXYGEN SATURATION: 99 % | SYSTOLIC BLOOD PRESSURE: 132 MMHG | DIASTOLIC BLOOD PRESSURE: 83 MMHG

## 2024-02-13 DIAGNOSIS — R73.03 PREDIABETES: ICD-10-CM

## 2024-02-13 DIAGNOSIS — F32.A ANXIETY AND DEPRESSION: ICD-10-CM

## 2024-02-13 DIAGNOSIS — I10 ESSENTIAL HYPERTENSION: ICD-10-CM

## 2024-02-13 DIAGNOSIS — R82.90 FOUL SMELLING URINE: ICD-10-CM

## 2024-02-13 DIAGNOSIS — F41.9 ANXIETY AND DEPRESSION: ICD-10-CM

## 2024-02-13 DIAGNOSIS — E78.5 HYPERLIPIDEMIA LDL GOAL <100: Primary | ICD-10-CM

## 2024-02-13 LAB
BILIRUB BLD-MCNC: NEGATIVE MG/DL
CLARITY, POC: CLEAR
COLOR UR: YELLOW
EXPIRATION DATE: ABNORMAL
GLUCOSE UR STRIP-MCNC: NEGATIVE MG/DL
KETONES UR QL: NEGATIVE
LEUKOCYTE EST, POC: ABNORMAL
Lab: ABNORMAL
NITRITE UR-MCNC: NEGATIVE MG/ML
PH UR: 6 [PH] (ref 5–8)
PROT UR STRIP-MCNC: NEGATIVE MG/DL
RBC # UR STRIP: NEGATIVE /UL
SP GR UR: 1.01 (ref 1–1.03)
UROBILINOGEN UR QL: ABNORMAL

## 2024-02-13 PROCEDURE — 1159F MED LIST DOCD IN RCRD: CPT | Performed by: INTERNAL MEDICINE

## 2024-02-13 PROCEDURE — 99214 OFFICE O/P EST MOD 30 MIN: CPT | Performed by: INTERNAL MEDICINE

## 2024-02-13 PROCEDURE — 87086 URINE CULTURE/COLONY COUNT: CPT | Performed by: INTERNAL MEDICINE

## 2024-02-13 PROCEDURE — 1160F RVW MEDS BY RX/DR IN RCRD: CPT | Performed by: INTERNAL MEDICINE

## 2024-02-13 PROCEDURE — 3079F DIAST BP 80-89 MM HG: CPT | Performed by: INTERNAL MEDICINE

## 2024-02-13 PROCEDURE — 3075F SYST BP GE 130 - 139MM HG: CPT | Performed by: INTERNAL MEDICINE

## 2024-02-13 PROCEDURE — 81003 URINALYSIS AUTO W/O SCOPE: CPT | Performed by: INTERNAL MEDICINE

## 2024-02-13 RX ORDER — LANOLIN ALCOHOL/MO/W.PET/CERES
1000 CREAM (GRAM) TOPICAL DAILY
COMMUNITY

## 2024-02-13 RX ORDER — VITAMIN B COMPLEX
CAPSULE ORAL DAILY
COMMUNITY

## 2024-02-13 RX ORDER — GLUCOSAMINE/D3/BOSWELLIA SERRA 1500MG-400
TABLET ORAL
COMMUNITY

## 2024-02-14 ENCOUNTER — TELEPHONE (OUTPATIENT)
Dept: INTERNAL MEDICINE | Facility: CLINIC | Age: 74
End: 2024-02-14
Payer: MEDICARE

## 2024-02-14 LAB — BACTERIA SPEC AEROBE CULT: NO GROWTH

## 2024-03-15 DIAGNOSIS — F41.9 ANXIETY: ICD-10-CM

## 2024-03-15 RX ORDER — BUPROPION HYDROCHLORIDE 300 MG/1
300 TABLET ORAL DAILY
Qty: 90 TABLET | Refills: 1 | Status: SHIPPED | OUTPATIENT
Start: 2024-03-15

## 2024-04-03 RX ORDER — ALPRAZOLAM 1 MG/1
1 TABLET ORAL NIGHTLY PRN
Qty: 30 TABLET | Refills: 1 | Status: SHIPPED | OUTPATIENT
Start: 2024-04-03

## 2024-04-03 NOTE — TELEPHONE ENCOUNTER
Rx Refill Note  Requested Prescriptions     Pending Prescriptions Disp Refills    ALPRAZolam (XANAX) 1 MG tablet 30 tablet 1     Sig: Take 1 tablet by mouth At Night As Needed for Anxiety.      Last office visit with prescribing clinician: 2/13/2024   Last telemedicine visit with prescribing clinician: Visit date not found   Next office visit with prescribing clinician: 6/11/2024     Angie Medina MA  04/03/24, 08:30 EDT

## 2024-04-11 ENCOUNTER — TELEPHONE (OUTPATIENT)
Dept: INTERNAL MEDICINE | Facility: CLINIC | Age: 74
End: 2024-04-11

## 2024-05-14 DIAGNOSIS — I10 ESSENTIAL HYPERTENSION: Primary | ICD-10-CM

## 2024-05-14 RX ORDER — TELMISARTAN 80 MG/1
80 TABLET ORAL DAILY
Qty: 90 TABLET | Refills: 1 | Status: SHIPPED | OUTPATIENT
Start: 2024-05-14

## 2024-05-14 RX ORDER — TELMISARTAN 80 MG/1
80 TABLET ORAL DAILY
Qty: 90 TABLET | Refills: 1 | Status: SHIPPED | OUTPATIENT
Start: 2024-05-14 | End: 2024-05-14 | Stop reason: SDUPTHER

## 2024-05-14 RX ORDER — TELMISARTAN 80 MG/1
80 TABLET ORAL DAILY
Qty: 10 TABLET | Refills: 0 | Status: SHIPPED | OUTPATIENT
Start: 2024-05-14

## 2024-05-22 RX ORDER — HYDROCHLOROTHIAZIDE 25 MG/1
25 TABLET ORAL DAILY
Qty: 90 TABLET | Refills: 3 | Status: SHIPPED | OUTPATIENT
Start: 2024-05-22

## 2024-06-09 NOTE — PROGRESS NOTES
Subjective:     Encounter Date:06/13/2024      Patient ID: Deidre Gomez is a 73 y.o.  white female from [place], [place], retired teacher at [employer].     REFERRING PHYSICIAN: Aspen Salas DO  NEPHROLOGIST: Constance Looney MD.    Chief Complaint:   Chief Complaint   Patient presents with    Essential hypertension     Problem List:  Hypertension  Intolerant to olmesartan, reports side effects with almost all blood pressure medications attempted, clonidine ineffective, says diuretics did not help, amlodipine with hair loss  Apparently acceptable renal artery duplex with her nephrologist in 2021-  24-hour ambulatory blood monitor July 2022 which showed average awake blood pressure was 143/79 mmHg, heart rate 62 bpm, average asleep blood pressure was 136/71 mmHg, 64 bpm, and average overall blood pressure 142/70 mmHg, 62 bpm.  Echocardiogram 7/15/2022: LVEF 68%, RVSP less than 35 mmHg, no significant structural or functional valvular abnormalities demonstrated, diastolic function normal  Residual class I symptoms with normal ECG July 2022, CCS class 0 angina.NYHA class I-II SOB on exertion March 2023, August 2023 with normal ECG  Hyperlipidemia; declined statins, ASCVD 10-year risk is 17.6 %, 9.6 % if treated, wants to think about PCSK9 inhibitor August 2023, December 2023, June 2024  Anxiety and depression  Chronic kidney disease stage III with apparent abnormal acceptable renal ultrasound in 2021- data deficit  Systolic heart murmur  Intermittent palpitations  Lumbar back pain  ADHD  RLS  Osteoporosis  At risk for sleep apnea/chronic insomnia with remote sleep study-data deficit, acceptable outpatient sleep oximetry monitor July 2022 with no recommendations for nocturnal oxygen.  Former tobacco use; 1 pack/day x 50 years, quit in 2019  History of TIA x2  Abnormal mammogram with apparent negative biopsy-data deficit  Dizziness, Holter monitor benign August 2023 showing heart rate ranged between  57-126bpm with average 77bpm. There was no evidence of atrial fibrillation/flutter, ventricular tachycardia, pauses, or heart block. Episode of SVT that only lasted for 12 beats.   [facility] ED visit September 2023 for hyperkalemia  Prediabetes; hemoglobin A1c 5.8% October 2023  Surgical history:  Appendectomy  Breast biopsy/cyst aspiration  Cosmetic surgery  Hysterectomy  Tubal ligation  Breast biopsy    Allergies   Allergen Reactions    Amlodipine Other (See Comments)     Hair loss    Codeine Nausea Only    Olmesartan     Cephalexin Rash    Penicillins Rash       Current Outpatient Medications   Medication Instructions    acetaminophen (TYLENOL 8 HOUR) 650 mg, Oral, Every 8 Hours PRN    acyclovir (ZOVIRAX) 400 MG tablet Take 1 tablet by mouth As Needed.    ALPRAZolam (XANAX) 1 mg, Oral, Nightly PRN    B Complex Vitamins (vitamin b complex) capsule capsule Oral, Daily    Biotin 53190 MCG tablet Oral    buPROPion XL (WELLBUTRIN XL) 300 mg, Oral, Daily    CHOLECALCIFEROL PO 10,000 Units, Oral, Every 7 Days    Diclofenac Sodium (VOLTAREN) 1 % gel gel Apply 4 g topically to the appropriate area as directed As Needed.    Ergocalciferol (VITAMIN D2 PO) Vitamin D2 1,250 mcg (50,000 unit) capsule    fluocinonide (LIDEX) 0.05 % cream 1 application , Topical, 2 Times Daily    hydroCHLOROthiazide 25 mg, Oral, Daily    HYDROcodone-acetaminophen (NORCO) 7.5-325 MG per tablet 1 tablet, Oral, As Needed    hydrOXYzine pamoate (VISTARIL) 25 MG capsule TAKE 1 CAPSULE  AT NIGHT AS NEEDED FOR ANXIETY.    multivitamin (THERAGRAN) tablet tablet Take 1 tablet by mouth Daily.    mupirocin (BACTROBAN) 2 % ointment 1 application , Topical, Daily    PreviDent 5000 Booster Plus 1.1 % paste     telmisartan (MICARDIS) 80 mg, Oral, Daily    vitamin B-12 (CYANOCOBALAMIN) 1,000 mcg, Oral, Daily    zolpidem (AMBIEN) 10 mg, Oral, Nightly PRN         HISTORY OF PRESENT ILLNESS:  The patient is here for 6-month follow-up and is a hypertension clinic  patient.  She was supposed to be on PCSK9 inhibitor but discontinued it and wanted to think about restarting it because she did not want to give herself the injections at her last office visit and wants to research it some more.  She still does not want to add any further medications if she does not have to.  She wore a 24-hour ambulatory blood monitor which showed average awake blood pressure was 143/79 mmHg, heart rate 62 bpm, average asleep blood pressure was 136/71 mmHg, 64 bpm, and average overall blood pressure 142/70 mmHg, 62 bpm.   Remotely she was on Bystolic but did not want to continue taking this medication because she did not feel like it was effective.   She wore a Holter monitor that was rather benign in August 2023 showing heart rate ranged between 57-126bpm with average 77bpm. There was no evidence of atrial fibrillation/flutter, ventricular tachycardia, pauses, or heart block.  Episode of SVT that only lasted for 12 beats.  Patient has anxiety and was supposed to a therapist but says that she has not done so because she feels like she is always at doctors appointments.  The patient canceled both of her February 2024 appointments with me.  When she saw Dr. Salas in February 2024 her blood pressure was 132/83, heart rate 80 bpm.  Her main complaint is of back pain.  She is supposed to have an upcoming epidural.  She feels like due to her pain she is not able to do as much activity and she does not feel well because of that.  She has difficulty sleeping and was given Xanax and Ambien to see if this would help.  She says that she goes to the restroom a lot at night.  She was advised to take her hydrochlorothiazide in the morning.  She has been taking her telmisartan and hydrochlorothiazide at the same time in the morning.  She was advised to take her telmisartan nightly instead of during the day.  She says that she often does not eat breakfast or lunch and takes about 5 medications all at once in the  "morning including a pain pill.  She feels some fatigue after doing this.  She tries to adhere to a heart healthy diet and limit salt.  Her blood pressure is mostly in the 130s.  Manual blood pressure right arm sitting was 130/68.  The patient sometimes has been taking half of her hydrochlorothiazide instead of a full pill.    ROS   All other systems reviewed and otherwise negative.    Procedures       Objective:       Vitals:    06/13/24 1348 06/13/24 1349 06/13/24 1413   BP: 154/94 140/84 130/68   BP Location: Right arm Right arm Right arm   Patient Position: Sitting Standing Sitting   Pulse: 85 87    SpO2: 98% 96%    Weight: 61.6 kg (135 lb 12.8 oz)     Height: 162.6 cm (64\")       Body mass index is 23.31 kg/m².  Wt Readings from Last 2 Encounters:   06/13/24 61.6 kg (135 lb 12.8 oz)   06/11/24 62.1 kg (136 lb 12.8 oz)        Constitutional:       Appearance: Healthy appearance. Not in distress.   Neck:      Vascular: No JVR. JVD normal.   Pulmonary:      Effort: Pulmonary effort is normal.      Breath sounds: Normal breath sounds. No wheezing. No rhonchi. No rales.   Chest:      Chest wall: Not tender to palpatation.   Cardiovascular:      PMI at left midclavicular line. Normal rate. Regular rhythm. Normal S1. Normal S2.       Murmurs: There is a grade 1/6 systolic murmur at the URSB.      No gallop.  No click. No rub.   Pulses:     Intact distal pulses.   Edema:     Peripheral edema absent.   Abdominal:      General: Bowel sounds are normal.      Palpations: Abdomen is soft.      Tenderness: There is no abdominal tenderness.   Musculoskeletal: Normal range of motion.         General: No tenderness. Skin:     General: Skin is warm and dry.   Neurological:      General: No focal deficit present.      Mental Status: Alert and oriented to person, place and time.           Lab Review:   Lab Results   Component Value Date    GLUCOSE 101 (H) 06/11/2024    BUN 25 (H) 06/11/2024    CREATININE 1.21 (H) 06/11/2024    " EGFRIFNONA 51 (L) 11/19/2021    EGFRIFAFRI 58 (L) 11/19/2021    BCR 20.7 06/11/2024    CO2 24.3 06/11/2024    CALCIUM 9.6 06/11/2024    PROTENTOTREF 6.6 09/11/2023    ALBUMIN 4.4 06/11/2024    LABIL2 2.5 09/11/2023    AST 14 06/11/2024    ALT 6 06/11/2024       Lab Results   Component Value Date    WBC 7.51 09/11/2023    HGB 13.6 09/13/2023    HCT 40 09/13/2023    MCV 87.8 09/11/2023     09/11/2023       Lab Results   Component Value Date    HGBA1C 5.90 (H) 06/11/2024       Lab Results   Component Value Date    TSH 3.260 10/13/2023       Lab Results   Component Value Date    CHOL 226 (H) 06/11/2024    CHOL 238 (H) 04/03/2023     Lab Results   Component Value Date    TRIG 158 (H) 06/11/2024    TRIG 309 (H) 09/11/2023     Lab Results   Component Value Date    HDL 86 (H) 06/11/2024    HDL 70 (H) 09/11/2023     Lab Results   Component Value Date     (H) 06/11/2024    LDL 91 09/11/2023           Lab Results   Component Value Date    BNP 19 05/24/2014                 Results for orders placed in visit on 07/05/22    Adult Transthoracic Echo Complete W/ Cont if Necessary Per Protocol    Interpretation Summary  · Estimated left ventricular EF = 68% Left ventricular ejection fraction appears to be 66 - 70%. Left ventricular systolic function is normal.  · Left ventricular diastolic function was normal.  · Estimated right ventricular systolic pressure from tricuspid regurgitation is normal (<35 mmHg).  · Normal right ventricular cavity size, wall thickness, systolic function and septal motion noted.  · No evidence of pulmonary hypertension is present.  · There is no evidence of pericardial effusion.  · No significant structural or functional valvular abnormalities demonstrated.            Advance Care Planning   ACP discussion was held with the patient during this visit. Patient does not have an advance directive, declines further assistance.      Assessment:    Patient had a benign Holter monitor in August  2023.  Her blood pressures are mostly in the 130s.  Anxiety and back pain sometimes contribute to higher readings.  She will start taking her telmisartan at night and continue her hydrochlorothiazide during the day.  Patient declined PCSK9 inhibitor and statins.  Not interested in any additional medication at this time.     Diagnosis Plan   1. Essential hypertension  Her blood pressures are mostly in the 130s.  Anxiety and back pain sometimes contribute to higher readings.  She will start taking her telmisartan at night and continue her hydrochlorothiazide during the day.      2. Hyperlipidemia LDL goal <100  Abnormal lipid panel June 2024, I encouraged the patient to start a PCSK9 inhibitor in view of her ASCVD 10-year risk of 17.6%, 9.6% with treatment.  She declined statins and PCSK9 inhibitor.  Will adhere to heart healthy diet.  She is not interested in any additional medication at this time.      3. Prediabetes  Physical activity as tolerated, heart healthy diet             Plan:         Patient to continue current medications and close follow up with the above providers.  Tentative cardiology follow up in December 2024 or patient may return sooner PRN.  Take telmisartan nightly and hydrochlorothiazide during the day.      Electronically signed by WELLINGTON Holguin, 06/13/24, 2:22 PM EDT.

## 2024-06-10 RX ORDER — TELMISARTAN 80 MG/1
80 TABLET ORAL DAILY
Qty: 30 TABLET | Refills: 1 | Status: SHIPPED | OUTPATIENT
Start: 2024-06-10

## 2024-06-11 ENCOUNTER — OFFICE VISIT (OUTPATIENT)
Dept: INTERNAL MEDICINE | Facility: CLINIC | Age: 74
End: 2024-06-11
Payer: MEDICARE

## 2024-06-11 ENCOUNTER — LAB (OUTPATIENT)
Dept: LAB | Facility: HOSPITAL | Age: 74
End: 2024-06-11
Payer: MEDICARE

## 2024-06-11 VITALS
TEMPERATURE: 97.3 F | WEIGHT: 136.8 LBS | BODY MASS INDEX: 24.24 KG/M2 | HEIGHT: 63 IN | SYSTOLIC BLOOD PRESSURE: 138 MMHG | DIASTOLIC BLOOD PRESSURE: 82 MMHG | OXYGEN SATURATION: 96 % | HEART RATE: 80 BPM

## 2024-06-11 DIAGNOSIS — E55.9 VITAMIN D DEFICIENCY: ICD-10-CM

## 2024-06-11 DIAGNOSIS — E78.5 HYPERLIPIDEMIA LDL GOAL <100: Primary | ICD-10-CM

## 2024-06-11 DIAGNOSIS — E78.5 HYPERLIPIDEMIA LDL GOAL <100: ICD-10-CM

## 2024-06-11 DIAGNOSIS — E53.8 B12 DEFICIENCY: ICD-10-CM

## 2024-06-11 DIAGNOSIS — I10 ESSENTIAL HYPERTENSION: ICD-10-CM

## 2024-06-11 DIAGNOSIS — F32.A ANXIETY AND DEPRESSION: ICD-10-CM

## 2024-06-11 DIAGNOSIS — F41.9 ANXIETY AND DEPRESSION: ICD-10-CM

## 2024-06-11 DIAGNOSIS — R73.03 PREDIABETES: ICD-10-CM

## 2024-06-11 DIAGNOSIS — F51.04 CHRONIC INSOMNIA: ICD-10-CM

## 2024-06-11 DIAGNOSIS — Z87.891 FORMER SMOKER: ICD-10-CM

## 2024-06-11 LAB
ALBUMIN SERPL-MCNC: 4.4 G/DL (ref 3.5–5.2)
ALBUMIN/GLOB SERPL: 1.8 G/DL
ALP SERPL-CCNC: 62 U/L (ref 39–117)
ALT SERPL W P-5'-P-CCNC: 6 U/L (ref 1–33)
ANION GAP SERPL CALCULATED.3IONS-SCNC: 11.7 MMOL/L (ref 5–15)
AST SERPL-CCNC: 14 U/L (ref 1–32)
BILIRUB SERPL-MCNC: 0.2 MG/DL (ref 0–1.2)
BUN SERPL-MCNC: 25 MG/DL (ref 8–23)
BUN/CREAT SERPL: 20.7 (ref 7–25)
CALCIUM SPEC-SCNC: 9.6 MG/DL (ref 8.6–10.5)
CHLORIDE SERPL-SCNC: 104 MMOL/L (ref 98–107)
CHOLEST SERPL-MCNC: 226 MG/DL (ref 0–200)
CO2 SERPL-SCNC: 24.3 MMOL/L (ref 22–29)
CREAT SERPL-MCNC: 1.21 MG/DL (ref 0.57–1)
EGFRCR SERPLBLD CKD-EPI 2021: 47.4 ML/MIN/1.73
GLOBULIN UR ELPH-MCNC: 2.4 GM/DL
GLUCOSE SERPL-MCNC: 101 MG/DL (ref 65–99)
HDLC SERPL-MCNC: 86 MG/DL (ref 40–60)
LDLC SERPL CALC-MCNC: 113 MG/DL (ref 0–100)
LDLC/HDLC SERPL: 1.26 {RATIO}
POTASSIUM SERPL-SCNC: 4.8 MMOL/L (ref 3.5–5.2)
PROT SERPL-MCNC: 6.8 G/DL (ref 6–8.5)
SODIUM SERPL-SCNC: 140 MMOL/L (ref 136–145)
TRIGL SERPL-MCNC: 158 MG/DL (ref 0–150)
VLDLC SERPL-MCNC: 27 MG/DL (ref 5–40)

## 2024-06-11 PROCEDURE — 80061 LIPID PANEL: CPT

## 2024-06-11 PROCEDURE — 83036 HEMOGLOBIN GLYCOSYLATED A1C: CPT

## 2024-06-11 PROCEDURE — 80053 COMPREHEN METABOLIC PANEL: CPT

## 2024-06-11 PROCEDURE — 82306 VITAMIN D 25 HYDROXY: CPT

## 2024-06-11 PROCEDURE — 3079F DIAST BP 80-89 MM HG: CPT | Performed by: INTERNAL MEDICINE

## 2024-06-11 PROCEDURE — 3075F SYST BP GE 130 - 139MM HG: CPT | Performed by: INTERNAL MEDICINE

## 2024-06-11 PROCEDURE — 1159F MED LIST DOCD IN RCRD: CPT | Performed by: INTERNAL MEDICINE

## 2024-06-11 PROCEDURE — 82607 VITAMIN B-12: CPT

## 2024-06-11 PROCEDURE — G2211 COMPLEX E/M VISIT ADD ON: HCPCS | Performed by: INTERNAL MEDICINE

## 2024-06-11 PROCEDURE — 1125F AMNT PAIN NOTED PAIN PRSNT: CPT | Performed by: INTERNAL MEDICINE

## 2024-06-11 PROCEDURE — 1160F RVW MEDS BY RX/DR IN RCRD: CPT | Performed by: INTERNAL MEDICINE

## 2024-06-11 PROCEDURE — 99214 OFFICE O/P EST MOD 30 MIN: CPT | Performed by: INTERNAL MEDICINE

## 2024-06-11 RX ORDER — ALPRAZOLAM 1 MG/1
1 TABLET ORAL NIGHTLY PRN
Qty: 30 TABLET | Refills: 1 | Status: SHIPPED | OUTPATIENT
Start: 2024-06-11

## 2024-06-11 RX ORDER — ALPRAZOLAM 1 MG/1
1 TABLET ORAL NIGHTLY PRN
Qty: 30 TABLET | Refills: 1 | Status: CANCELLED | OUTPATIENT
Start: 2024-06-11

## 2024-06-11 RX ORDER — ZOLPIDEM TARTRATE 10 MG/1
10 TABLET ORAL NIGHTLY PRN
Qty: 30 TABLET | Refills: 1 | Status: CANCELLED | OUTPATIENT
Start: 2024-06-11

## 2024-06-11 RX ORDER — BUPROPION HYDROCHLORIDE 300 MG/1
300 TABLET ORAL DAILY
Qty: 90 TABLET | Refills: 1 | Status: CANCELLED | OUTPATIENT
Start: 2024-06-11

## 2024-06-11 RX ORDER — ZOLPIDEM TARTRATE 10 MG/1
10 TABLET ORAL NIGHTLY PRN
Qty: 30 TABLET | Refills: 1 | Status: SHIPPED | OUTPATIENT
Start: 2024-06-11

## 2024-06-11 RX ORDER — HYDROXYZINE PAMOATE 25 MG/1
CAPSULE ORAL
Qty: 30 CAPSULE | Refills: 0 | Status: CANCELLED | OUTPATIENT
Start: 2024-06-11

## 2024-06-11 RX ORDER — METRONIDAZOLE 500 MG/1
1 TABLET ORAL EVERY 12 HOURS SCHEDULED
COMMUNITY
Start: 2024-02-28 | End: 2024-06-13

## 2024-06-11 NOTE — PROGRESS NOTES
"Subjective   Deidre Gomez is a 73 y.o. female.     History of Present Illness   F/u on chronic conditions: HTN, HLP, prediabetes, anxiety and depression, and insomnia. HTN controlled with Micardis and HCTZ. Anxiety and depression doing better with Wellbutrin and xanax. Still has times does not want to go out.  No suicidal or homicidal ideations. HLP will not take statin. Insomnia controlled with prn ambien. Prediabetes controlled with diet.   The following portions of the patient's history were reviewed and updated as appropriate: allergies, current medications, past family history, past medical history, past social history, past surgical history, and problem list.    Review of Systems   Constitutional:  Negative for fever.   Respiratory:  Negative for cough and shortness of breath.    Cardiovascular:  Negative for chest pain and leg swelling.   Musculoskeletal:  Positive for arthralgias.   Psychiatric/Behavioral:  Positive for sleep disturbance. The patient is nervous/anxious.         Depression     /82 (BP Location: Left arm, Patient Position: Sitting, Cuff Size: Adult)   Pulse 80   Temp 97.3 °F (36.3 °C) (Temporal)   Ht 160 cm (62.99\")   Wt 62.1 kg (136 lb 12.8 oz)   LMP  (LMP Unknown)   SpO2 96%   BMI 24.24 kg/m²     Objective   Physical Exam  Vitals reviewed.   Cardiovascular:      Rate and Rhythm: Normal rate and regular rhythm.   Pulmonary:      Effort: No respiratory distress.      Breath sounds: No wheezing.   Neurological:      Mental Status: She is alert and oriented to person, place, and time.   Psychiatric:         Behavior: Behavior normal.         Assessment & Plan   Diagnoses and all orders for this visit:    1. Hyperlipidemia LDL goal <100 (Primary)  -     Lipid Panel; Future  Statin intolerant  2. Essential hypertension  -     Comprehensive Metabolic Panel; Future  Continue Micarids 80 mg po qd and HCTZ 25 mg po qd  3. Anxiety and depression  -     ALPRAZolam (XANAX) 1 MG " tablet; Take 1 tablet by mouth At Night As Needed for Anxiety.  Dispense: 30 tablet; Refill: 1  Continue Wellbutrin  mg po qd and xanax. Offered her behavioral health for psy/therapy and she declines  4. Chronic insomnia  -     zolpidem (AMBIEN) 10 MG tablet; Take 1 tablet by mouth At Night As Needed for Sleep.  Dispense: 30 tablet; Refill: 1  Continue Ambien 10 mg po qhs  5. Prediabetes  -     Hemoglobin A1c; Future  Low carb diet  6. Former smoker  -     CT Chest Low Dose Wo; Future  Quit 5 years ago  7. B12 deficiency  -     Vitamin B12; Future    8. Vitamin D deficiency  -     Vitamin D,25-Hydroxy; Future

## 2024-06-12 ENCOUNTER — TELEPHONE (OUTPATIENT)
Dept: INTERNAL MEDICINE | Facility: CLINIC | Age: 74
End: 2024-06-12

## 2024-06-12 ENCOUNTER — TELEPHONE (OUTPATIENT)
Dept: INTERNAL MEDICINE | Facility: CLINIC | Age: 74
End: 2024-06-12
Payer: MEDICARE

## 2024-06-12 LAB
25(OH)D3 SERPL-MCNC: 88 NG/ML (ref 30–100)
HBA1C MFR BLD: 5.9 % (ref 4.8–5.6)
VIT B12 BLD-MCNC: >2000 PG/ML (ref 211–946)

## 2024-06-12 NOTE — TELEPHONE ENCOUNTER
----- Message from Aspen Salas sent at 6/12/2024  8:47 AM EDT -----  Your kidney function is decreased.  Avoid any products that contain ibuprofen.  Sugars are in prediabetic range  Mild increase in triglycerides but significant improvement compared to last labs.  B12 high.

## 2024-06-12 NOTE — TELEPHONE ENCOUNTER
"Left message with office number to return call about labs.  HUB CAN RELAY  \"Your kidney function is decreased.  Avoid any products that contain ibuprofen.  Sugars are in prediabetic range  Mild increase in triglycerides but significant improvement compared to last labs.  B12 high.\"  Aspen Salas, DO  "

## 2024-06-12 NOTE — TELEPHONE ENCOUNTER
Name: Jason Deidre Slater    Relationship: Self    Best Callback Number: [phone]    HUB PROVIDED THE RELAY MESSAGE FROM THE OFFICE   PATIENT VOICED UNDERSTANDING AND HAS NO FURTHER QUESTIONS AT THIS TIME    ADDITIONAL INFORMATION:

## 2024-06-13 ENCOUNTER — OFFICE VISIT (OUTPATIENT)
Dept: CARDIOLOGY | Facility: CLINIC | Age: 74
End: 2024-06-13
Payer: MEDICARE

## 2024-06-13 VITALS
WEIGHT: 135.8 LBS | HEIGHT: 64 IN | BODY MASS INDEX: 23.18 KG/M2 | HEART RATE: 87 BPM | SYSTOLIC BLOOD PRESSURE: 130 MMHG | DIASTOLIC BLOOD PRESSURE: 68 MMHG | OXYGEN SATURATION: 96 %

## 2024-06-13 DIAGNOSIS — I10 ESSENTIAL HYPERTENSION: Primary | ICD-10-CM

## 2024-06-13 DIAGNOSIS — E78.5 HYPERLIPIDEMIA LDL GOAL <100: ICD-10-CM

## 2024-06-13 DIAGNOSIS — R73.03 PREDIABETES: ICD-10-CM

## 2024-06-13 PROCEDURE — 3078F DIAST BP <80 MM HG: CPT | Performed by: NURSE PRACTITIONER

## 2024-06-13 PROCEDURE — 1159F MED LIST DOCD IN RCRD: CPT | Performed by: NURSE PRACTITIONER

## 2024-06-13 PROCEDURE — 1160F RVW MEDS BY RX/DR IN RCRD: CPT | Performed by: NURSE PRACTITIONER

## 2024-06-13 PROCEDURE — 3075F SYST BP GE 130 - 139MM HG: CPT | Performed by: NURSE PRACTITIONER

## 2024-06-13 PROCEDURE — 99214 OFFICE O/P EST MOD 30 MIN: CPT | Performed by: NURSE PRACTITIONER

## 2024-06-14 ENCOUNTER — TELEPHONE (OUTPATIENT)
Dept: INTERNAL MEDICINE | Facility: CLINIC | Age: 74
End: 2024-06-14

## 2024-06-14 NOTE — TELEPHONE ENCOUNTER
Caller: Deidre Gomez    Relationship: Self    Best call back number: [phone]    What orders are you requesting (i.e. lab or imaging): CT SCAN  CHEST       Where will you receive your lab/imaging services: [facility]  FAX NUMBER [phone]    Additional notes: THE PATIENT STATES THAT THE CT SCAN WOULD NOT COST HER ANYTHING AT [facility] SHE WOULD LIKE THE ORDER SENT TO THEM

## 2024-08-04 DIAGNOSIS — F41.9 ANXIETY: ICD-10-CM

## 2024-08-05 RX ORDER — BUPROPION HYDROCHLORIDE 300 MG/1
300 TABLET ORAL DAILY
Qty: 90 TABLET | Refills: 0 | Status: SHIPPED | OUTPATIENT
Start: 2024-08-05

## 2024-08-05 NOTE — TELEPHONE ENCOUNTER
Last filled:3/15/24  buPROPion XL (WELLBUTRIN XL) 300 MG   90 w/1 refill    LOV:6/11/24  NOV:10/14/24

## 2024-08-12 DIAGNOSIS — F51.04 CHRONIC INSOMNIA: ICD-10-CM

## 2024-08-12 RX ORDER — ZOLPIDEM TARTRATE 10 MG/1
10 TABLET ORAL NIGHTLY PRN
Qty: 30 TABLET | Refills: 2 | Status: SHIPPED | OUTPATIENT
Start: 2024-08-12

## 2024-08-12 NOTE — TELEPHONE ENCOUNTER
Last appointment: 6/11/24  Next appointment: 10/14/2024     UDS:10/12/2022  CSC: 6/11/2024    Last Refill: 6/11/24 for 30 with 1 refill

## 2024-10-16 DIAGNOSIS — F41.9 ANXIETY: ICD-10-CM

## 2024-10-16 RX ORDER — BUPROPION HYDROCHLORIDE 300 MG/1
300 TABLET ORAL DAILY
Qty: 90 TABLET | Refills: 0 | Status: SHIPPED | OUTPATIENT
Start: 2024-10-16

## 2024-10-16 NOTE — TELEPHONE ENCOUNTER
Last filled:8/5/24  buPROPion XL (WELLBUTRIN XL) 300 MG  Dispense Quantity: 90 tablet Refills: 0    Sig: TAKE 1 TABLET EVERY DAY  LOV:6/11/24  NOV:10/17/24

## 2024-10-17 ENCOUNTER — LAB (OUTPATIENT)
Dept: LAB | Facility: HOSPITAL | Age: 74
End: 2024-10-17
Payer: MEDICARE

## 2024-10-17 ENCOUNTER — OFFICE VISIT (OUTPATIENT)
Dept: INTERNAL MEDICINE | Facility: CLINIC | Age: 74
End: 2024-10-17
Payer: MEDICARE

## 2024-10-17 VITALS
SYSTOLIC BLOOD PRESSURE: 136 MMHG | TEMPERATURE: 97.5 F | BODY MASS INDEX: 23.81 KG/M2 | WEIGHT: 134.4 LBS | OXYGEN SATURATION: 97 % | HEIGHT: 63 IN | DIASTOLIC BLOOD PRESSURE: 82 MMHG

## 2024-10-17 DIAGNOSIS — Z13.29 THYROID DISORDER SCREEN: ICD-10-CM

## 2024-10-17 DIAGNOSIS — R73.03 PREDIABETES: ICD-10-CM

## 2024-10-17 DIAGNOSIS — I10 ESSENTIAL HYPERTENSION: ICD-10-CM

## 2024-10-17 DIAGNOSIS — E78.5 HYPERLIPIDEMIA LDL GOAL <100: ICD-10-CM

## 2024-10-17 DIAGNOSIS — Z00.00 MEDICARE ANNUAL WELLNESS VISIT, SUBSEQUENT: Primary | ICD-10-CM

## 2024-10-17 LAB — HBA1C MFR BLD: 5.7 % (ref 4.8–5.6)

## 2024-10-17 PROCEDURE — 85025 COMPLETE CBC W/AUTO DIFF WBC: CPT

## 2024-10-17 PROCEDURE — 80053 COMPREHEN METABOLIC PANEL: CPT

## 2024-10-17 PROCEDURE — 80061 LIPID PANEL: CPT

## 2024-10-17 PROCEDURE — 1170F FXNL STATUS ASSESSED: CPT | Performed by: INTERNAL MEDICINE

## 2024-10-17 PROCEDURE — 96160 PT-FOCUSED HLTH RISK ASSMT: CPT | Performed by: INTERNAL MEDICINE

## 2024-10-17 PROCEDURE — 3079F DIAST BP 80-89 MM HG: CPT | Performed by: INTERNAL MEDICINE

## 2024-10-17 PROCEDURE — 84443 ASSAY THYROID STIM HORMONE: CPT

## 2024-10-17 PROCEDURE — 83036 HEMOGLOBIN GLYCOSYLATED A1C: CPT

## 2024-10-17 PROCEDURE — 3075F SYST BP GE 130 - 139MM HG: CPT | Performed by: INTERNAL MEDICINE

## 2024-10-17 PROCEDURE — G0439 PPPS, SUBSEQ VISIT: HCPCS | Performed by: INTERNAL MEDICINE

## 2024-10-17 PROCEDURE — 1126F AMNT PAIN NOTED NONE PRSNT: CPT | Performed by: INTERNAL MEDICINE

## 2024-10-17 NOTE — PROGRESS NOTES
Subjective   The ABCs of the Annual Wellness Visit  Medicare Wellness Visit      Deidre Gomez is a 74 y.o. patient who presents for a Medicare Wellness Visit.    The following portions of the patient's history were reviewed and   updated as appropriate: allergies, current medications, past family history, past medical history, past social history, past surgical history, and problem list.    Compared to one year ago, the patient's physical   health is the same.  Compared to one year ago, the patient's mental   health is the same.    Recent Hospitalizations:  She was not admitted to the hospital during the last year.     Current Medical Providers:  Patient Care Team:  Aspen Salas DO as PCP - General  Aspen Salas DO as PCP - Family Medicine  Sherrie Apodaca APRN as Nurse Practitioner (Cardiology)    Outpatient Medications Prior to Visit   Medication Sig Dispense Refill    acetaminophen (Tylenol 8 Hour) 650 MG 8 hr tablet Take 1 tablet by mouth Every 8 (Eight) Hours As Needed for Mild Pain . 60 tablet 0    acyclovir (ZOVIRAX) 400 MG tablet Take 1 tablet by mouth As Needed.      ALPRAZolam (XANAX) 1 MG tablet Take 1 tablet by mouth At Night As Needed for Anxiety. 30 tablet 1    B Complex Vitamins (vitamin b complex) capsule capsule Take  by mouth Daily.      Biotin 84877 MCG tablet Take  by mouth.      buPROPion XL (WELLBUTRIN XL) 300 MG 24 hr tablet TAKE 1 TABLET EVERY DAY 90 tablet 0    CHOLECALCIFEROL PO Take 10,000 Units by mouth Every 7 (Seven) Days.      Diclofenac Sodium (VOLTAREN) 1 % gel gel Apply 4 g topically to the appropriate area as directed As Needed.      Ergocalciferol (VITAMIN D2 PO) Vitamin D2 1,250 mcg (50,000 unit) capsule      fluocinonide (LIDEX) 0.05 % cream Apply 1 Application topically to the appropriate area as directed 2 (Two) Times a Day.      hydroCHLOROthiazide 25 MG tablet TAKE 1 TABLET EVERY DAY 90 tablet 3    HYDROcodone-acetaminophen (NORCO) 7.5-325 MG per  tablet Take 1 tablet by mouth As Needed.      hydrOXYzine pamoate (VISTARIL) 25 MG capsule TAKE 1 CAPSULE  AT NIGHT AS NEEDED FOR ANXIETY. 30 capsule 0    mupirocin (BACTROBAN) 2 % ointment Apply 1 Application topically to the appropriate area as directed Daily.      PreviDent 5000 Booster Plus 1.1 % paste       telmisartan (MICARDIS) 80 MG tablet TAKE 1 TABLET BY MOUTH DAILY 30 tablet 1    zolpidem (AMBIEN) 10 MG tablet Take 1 tablet by mouth At Night As Needed for Sleep. 30 tablet 2    multivitamin (THERAGRAN) tablet tablet Take 1 tablet by mouth Daily.      vitamin B-12 (CYANOCOBALAMIN) 1000 MCG tablet Take 1 tablet by mouth Daily.       No facility-administered medications prior to visit.     Opioid medication/s are on active medication list.  and I have evaluated her active treatment plan and pain score trends (see table).  Vitals:    10/17/24 1138   PainSc: 0-No pain     I have reviewed the chart for potential of high risk medication and harmful drug interactions in the elderly.        Aspirin is not on active medication list.  Aspirin use is not indicated based on review of current medical condition/s. Risk of harm outweighs potential benefits.  .    Patient Active Problem List   Diagnosis    Gastroesophageal reflux disease    Hyperlipidemia LDL goal <100    Essential hypertension    Chronic insomnia    Anxiety and depression    Multinodular thyroid    Age-related osteoporosis without current pathological fracture    Suspected sleep apnea    Breast calcification seen on mammogram    Chronic kidney disease, stage 3    Heart murmur    Prediabetes    Allergic rhinitis due to pollen    Hypertrophy of nasal turbinates    Vasomotor rhinitis    Idiopathic urticaria    Sensorineural hearing loss (SNHL) of both ears    Acute bronchitis    Acute sinusitis     Advance Care Planning Advance Directive is not on file.  ACP discussion was held with the patient during this visit. Patient does not have an advance directive,  "information provided.            Objective   Vitals:    10/17/24 1138   BP: 136/82   BP Location: Left arm   Patient Position: Sitting   Cuff Size: Adult   Temp: 97.5 °F (36.4 °C)   SpO2: 97%   Weight: 61 kg (134 lb 6.4 oz)   Height: 159 cm (62.6\")   PainSc: 0-No pain       Estimated body mass index is 24.11 kg/m² as calculated from the following:    Height as of this encounter: 159 cm (62.6\").    Weight as of this encounter: 61 kg (134 lb 6.4 oz).    BMI is within normal parameters. No other follow-up for BMI required.       Does the patient have evidence of cognitive impairment? No                                                                                               Health  Risk Assessment    Smoking Status:  Social History     Tobacco Use   Smoking Status Former    Current packs/day: 0.00    Average packs/day: 1 pack/day for 54.1 years (54.1 ttl pk-yrs)    Types: Cigarettes    Start date: 1965    Quit date: 2019    Years since quittin.7    Passive exposure: Past   Smokeless Tobacco Never   Tobacco Comments    Smoked on and off  2-5 yrs at a time.     Alcohol Consumption:  Social History     Substance and Sexual Activity   Alcohol Use Never       Fall Risk Screen  STEADI Fall Risk Assessment was completed, and patient is at LOW risk for falls.Assessment completed on:10/17/2024    Depression Screenin/11/2024     1:23 PM   PHQ-2/PHQ-9 Depression Screening   Retired Little Interest or Pleasure in Doing Things 3-->nearly every day   Retired Feeling Down, Depressed or Hopeless 3-->nearly every day   Retired Trouble Falling or Staying Asleep, or Sleeping Too Much 2-->more than half the days   Retired Feeling Tired or Having Little Energy 1-->several days   Retired Poor Appetite or Overeating 1-->several days   Retired Feeling Bad about Yourself - or that You are a Failure or Have Let Yourself or Your Family Down 0-->not at all   Retired Trouble Concentrating on Things, Such as Reading the " Newspaper or Watching Television 0-->not at all   Retired Moving or Speaking So Slowly that Other People Could Have Noticed? Or the Opposite - Being So Fidgety 0-->not at all   Retired Thoughts that You Would be Better Off Dead or of Hurting Yourself in Some Way 0-->not at all   Retired PHQ-9: Brief Depression Severity Measure Score 10   Retired If You Checked Off Any Problems, How Difficult Have These Problems Made It For You to Do Your Work, Take Care of Things at Home, or Get Along with Other People? somewhat difficult     Health Habits and Functional and Cognitive Screening:      10/17/2024    11:39 AM   Functional & Cognitive Status   Do you have difficulty preparing food and eating? No   Do you have difficulty bathing yourself, getting dressed or grooming yourself? No   Do you have difficulty using the toilet? No   Do you have difficulty moving around from place to place? No   Do you have trouble with steps or getting out of a bed or a chair? No   Current Diet Well Balanced Diet   Dental Exam Up to date   Eye Exam Up to date   Exercise (times per week) 7 times per week   Current Exercises Include Walking   Do you need help using the phone?  No   Are you deaf or do you have serious difficulty hearing?  Yes   Do you need help to go to places out of walking distance? No   Do you need help shopping? No   Do you need help preparing meals?  No   Do you need help with housework?  No   Do you need help with laundry? No   Do you need help taking your medications? No   Do you need help managing money? No   Do you ever drive or ride in a car without wearing a seat belt? No   Have you felt unusual stress, anger or loneliness in the last month? No   Who do you live with? Alone   If you need help, do you have trouble finding someone available to you? Yes   Have you been bothered in the last four weeks by sexual problems? No   Do you have difficulty concentrating, remembering or making decisions? Yes            Age-appropriate Screening Schedule:  Refer to the list below for future screening recommendations based on patient's age, sex and/or medical conditions. Orders for these recommended tests are listed in the plan section. The patient has been provided with a written plan.    Health Maintenance List  Health Maintenance   Topic Date Due    TDAP/TD VACCINES (1 - Tdap) Never done    ZOSTER VACCINE (1 of 2) Never done    LUNG CANCER SCREENING  Never done    DXA SCAN  10/07/2023    COVID-19 Vaccine (1 - 2023-24 season) Never done    INFLUENZA VACCINE  03/31/2025 (Originally 8/1/2024)    Pneumococcal Vaccine 65+ (1 of 1 - PCV) 06/11/2025 (Originally 7/22/2015)    LIPID PANEL  06/11/2025    HEMOGLOBIN A1C  06/11/2025    ANNUAL WELLNESS VISIT  10/17/2025    COLORECTAL CANCER SCREENING  06/28/2027    HEPATITIS C SCREENING  Completed    DIABETIC FOOT EXAM  Discontinued    MAMMOGRAM  Discontinued    PAP SMEAR  Discontinued    DIABETIC EYE EXAM  Discontinued    URINE MICROALBUMIN  Discontinued                                                                                                                                                CMS Preventative Services Quick Reference  Risk Factors Identified During Encounter  Immunizations Discussed/Encouraged: Influenza and COVID19    The above risks/problems have been discussed with the patient.  Pertinent information has been shared with the patient in the After Visit Summary.  An After Visit Summary and PPPS were made available to the patient.    Follow Up:   Next Medicare Wellness visit to be scheduled in 1 year.     Assessment & Plan  Medicare annual wellness visit, subsequent  Done today  Hyperlipidemia LDL goal <100    lipids  Essential hypertension  cmp  Thyroid disorder screen  tsh  Prediabetes  A1c    Orders Placed This Encounter   Procedures    Comprehensive Metabolic Panel    Lipid Panel    TSH    Hemoglobin A1c    CBC & Differential             Follow Up:   No  follow-ups on file.

## 2024-10-18 LAB
ALBUMIN SERPL-MCNC: 4.5 G/DL (ref 3.5–5.2)
ALBUMIN/GLOB SERPL: 1.8 G/DL
ALP SERPL-CCNC: 67 U/L (ref 39–117)
ALT SERPL W P-5'-P-CCNC: 7 U/L (ref 1–33)
ANION GAP SERPL CALCULATED.3IONS-SCNC: 13.9 MMOL/L (ref 5–15)
AST SERPL-CCNC: 18 U/L (ref 1–32)
BASOPHILS # BLD AUTO: 0.04 10*3/MM3 (ref 0–0.2)
BASOPHILS NFR BLD AUTO: 0.5 % (ref 0–1.5)
BILIRUB SERPL-MCNC: 0.3 MG/DL (ref 0–1.2)
BUN SERPL-MCNC: 41 MG/DL (ref 8–23)
BUN/CREAT SERPL: 28.1 (ref 7–25)
CALCIUM SPEC-SCNC: 10.1 MG/DL (ref 8.6–10.5)
CHLORIDE SERPL-SCNC: 100 MMOL/L (ref 98–107)
CHOLEST SERPL-MCNC: 234 MG/DL (ref 0–200)
CO2 SERPL-SCNC: 23.1 MMOL/L (ref 22–29)
CREAT SERPL-MCNC: 1.46 MG/DL (ref 0.57–1)
DEPRECATED RDW RBC AUTO: 45.9 FL (ref 37–54)
EGFRCR SERPLBLD CKD-EPI 2021: 37.6 ML/MIN/1.73
EOSINOPHIL # BLD AUTO: 0.04 10*3/MM3 (ref 0–0.4)
EOSINOPHIL NFR BLD AUTO: 0.5 % (ref 0.3–6.2)
ERYTHROCYTE [DISTWIDTH] IN BLOOD BY AUTOMATED COUNT: 13.8 % (ref 12.3–15.4)
GLOBULIN UR ELPH-MCNC: 2.5 GM/DL
GLUCOSE SERPL-MCNC: 102 MG/DL (ref 65–99)
HCT VFR BLD AUTO: 42.6 % (ref 34–46.6)
HDLC SERPL-MCNC: 84 MG/DL (ref 40–60)
HGB BLD-MCNC: 15.1 G/DL (ref 12–15.9)
IMM GRANULOCYTES # BLD AUTO: 0.03 10*3/MM3 (ref 0–0.05)
IMM GRANULOCYTES NFR BLD AUTO: 0.4 % (ref 0–0.5)
LDLC SERPL CALC-MCNC: 120 MG/DL (ref 0–100)
LDLC/HDLC SERPL: 1.37 {RATIO}
LYMPHOCYTES # BLD AUTO: 2.54 10*3/MM3 (ref 0.7–3.1)
LYMPHOCYTES NFR BLD AUTO: 33.9 % (ref 19.6–45.3)
MCH RBC QN AUTO: 32.6 PG (ref 26.6–33)
MCHC RBC AUTO-ENTMCNC: 35.4 G/DL (ref 31.5–35.7)
MCV RBC AUTO: 92 FL (ref 79–97)
MONOCYTES # BLD AUTO: 0.68 10*3/MM3 (ref 0.1–0.9)
MONOCYTES NFR BLD AUTO: 9.1 % (ref 5–12)
NEUTROPHILS NFR BLD AUTO: 4.16 10*3/MM3 (ref 1.7–7)
NEUTROPHILS NFR BLD AUTO: 55.6 % (ref 42.7–76)
NRBC BLD AUTO-RTO: 0 /100 WBC (ref 0–0.2)
PLATELET # BLD AUTO: 176 10*3/MM3 (ref 140–450)
PMV BLD AUTO: 10.3 FL (ref 6–12)
POTASSIUM SERPL-SCNC: 4.7 MMOL/L (ref 3.5–5.2)
PROT SERPL-MCNC: 7 G/DL (ref 6–8.5)
RBC # BLD AUTO: 4.63 10*6/MM3 (ref 3.77–5.28)
SODIUM SERPL-SCNC: 137 MMOL/L (ref 136–145)
TRIGL SERPL-MCNC: 173 MG/DL (ref 0–150)
TSH SERPL DL<=0.05 MIU/L-ACNC: 1.86 UIU/ML (ref 0.27–4.2)
VLDLC SERPL-MCNC: 30 MG/DL (ref 5–40)
WBC NRBC COR # BLD AUTO: 7.49 10*3/MM3 (ref 3.4–10.8)

## 2024-10-21 ENCOUNTER — TELEPHONE (OUTPATIENT)
Dept: INTERNAL MEDICINE | Facility: CLINIC | Age: 74
End: 2024-10-21
Payer: MEDICARE

## 2024-10-21 NOTE — TELEPHONE ENCOUNTER
Pt notified and verbalized understanding. She will try to drink more water, not taking ibuprofen at all. She states she discussed talking with you at the appt about memory loss and was wondering if there was some sort of supplement you can recommend she take to try to help with that.

## 2024-10-21 NOTE — TELEPHONE ENCOUNTER
----- Message from Aspen Salas sent at 10/21/2024  7:23 AM EDT -----  Your kidney function is decreased.  Avoid any products that contain ibuprofen.  Mild increase in blood sugar, triglycerides, and bad cholesterol. Low carb diet.

## 2024-10-21 NOTE — TELEPHONE ENCOUNTER
Name: Deidre Gomez      Relationship: Self      Best Callback Number: [phone]      HUB PROVIDED THE RELAY MESSAGE FROM THE OFFICE      PATIENT: HAS FURTHER QUESTIONS AND WOULD LIKE A CALL BACK AT THE FOLLOWING PHONE NUMBER  [phone]    ADDITIONAL INFORMATION: PATIENT WOULD LIKE NIC TO CALL HER BACK. SHE STATES SHE HAS NOT TAKEN IBUPROFEN IN YEARS AND IS NOT SURE WHAT ELSE SHE CAN DO ABOUT THE KIDNEY FUNCTION

## 2024-11-14 ENCOUNTER — TELEPHONE (OUTPATIENT)
Dept: INTERNAL MEDICINE | Facility: CLINIC | Age: 74
End: 2024-11-14
Payer: MEDICARE

## 2024-11-14 NOTE — TELEPHONE ENCOUNTER
PATIENT CALLED AND STATES THAT SHE WENT TO THE ER LAST NIGHT. PATIENT IS HAVING PAIN IN HER RIGHT WRIST AND ARM.    PATIENT DECLINED SCHEDULING AN APPT.    PATIENT IS REQUESTING A SHOT AND WOULD LIKE TO SPEAK WITH SOMEONE IN THE OFFICE. PATIENT STATES THAT IF WE WILL NOT GIVE HER A SHOT THAT SHE WILL CALL THE ORTHOPEDIC.      CALL BACK: [phone]

## 2024-12-19 NOTE — PROGRESS NOTES
Subjective:     Encounter Date:12/18/2024      Patient ID: Deidre Gomez is a 74 y.o.  white female from [place], [place], retired teacher at [employer].     REFERRING PHYSICIAN: Aspen Salas DO  NEPHROLOGIST: Constance Looney MD.    Chief Complaint:   Chief Complaint   Patient presents with    Essential hypertension     Problem List:  Hypertension  Intolerant to olmesartan, reports side effects with almost all blood pressure medications attempted, clonidine ineffective, says diuretics did not help, amlodipine with hair loss  Apparently acceptable renal artery duplex with her nephrologist in 2021-  24-hour ambulatory blood monitor July 2022 which showed average awake blood pressure was 143/79 mmHg, heart rate 62 bpm, average asleep blood pressure was 136/71 mmHg, 64 bpm, and average overall blood pressure 142/70 mmHg, 62 bpm.  Echocardiogram 7/15/2022: LVEF 68%, RVSP less than 35 mmHg, no significant structural or functional valvular abnormalities demonstrated, diastolic function normal  Residual class I symptoms with normal ECG July 2022, CCS class 0 angina.NYHA class I-II SOB on exertion March 2023, August 2023, December 2024 with normal ECG  Hyperlipidemia; declined statins, ASCVD 10-year risk is 17.6 %, 9.6 % if treated, wants to think about PCSK9 inhibitor August 2023, December 2023, June 2024  Anxiety and depression  Chronic kidney disease stage III with apparent abnormal acceptable renal ultrasound in 2021- data deficit  Systolic heart murmur  Intermittent palpitations  Lumbar back pain  ADHD  RLS  Osteoporosis  At risk for sleep apnea/chronic insomnia with remote sleep study-data deficit, acceptable outpatient sleep oximetry monitor July 2022 with no recommendations for nocturnal oxygen.  Former tobacco use; 1 pack/day x 50 years, quit in 2019  History of TIA x2  Abnormal mammogram with apparent negative biopsy-data deficit  Dizziness, Holter monitor benign August 2023 showing heart rate  ranged between 57-126bpm with average 77bpm. There was no evidence of atrial fibrillation/flutter, ventricular tachycardia, pauses, or heart block. Episode of SVT that only lasted for 12 beats.   [facility] ED visit September 2023 for hyperkalemia  Prediabetes; hemoglobin A1c 5.8% October 2023  Surgical history:  Appendectomy  Breast biopsy/cyst aspiration  Cosmetic surgery  Hysterectomy  Tubal ligation  Breast biopsy    Allergies   Allergen Reactions    Amlodipine Other (See Comments)     Hair loss    Codeine Nausea Only    Olmesartan     Penicillin G Benzathine Provider Review Needed    Cephalexin Rash    Penicillin G Rash    Penicillins Rash       Current Outpatient Medications   Medication Instructions    acetaminophen (TYLENOL 8 HOUR) 650 mg, Oral, Every 8 Hours PRN    acyclovir (ZOVIRAX) 400 MG tablet Take 1 tablet by mouth As Needed.    ALPRAZolam (XANAX) 1 mg, Oral, Nightly PRN    B Complex Vitamins (vitamin b complex) capsule capsule Daily    Biotin 17182 MCG tablet Take  by mouth.    buPROPion XL (WELLBUTRIN XL) 300 mg, Oral, Daily    CHOLECALCIFEROL PO 10,000 Units, Every 7 Days    Diclofenac Sodium (VOLTAREN) 1 % gel gel Apply 4 g topically to the appropriate area as directed As Needed.    Ergocalciferol (VITAMIN D2 PO) Vitamin D2 1,250 mcg (50,000 unit) capsule    fluocinonide (LIDEX) 0.05 % cream 2 Times Daily    hydroCHLOROthiazide 25 mg, Oral, Daily    HYDROcodone-acetaminophen (NORCO) 7.5-325 MG per tablet 1 tablet, As Needed    hydrOXYzine pamoate (VISTARIL) 25 MG capsule TAKE 1 CAPSULE  AT NIGHT AS NEEDED FOR ANXIETY.    mupirocin (BACTROBAN) 2 % ointment Daily    PreviDent 5000 Booster Plus 1.1 % paste     telmisartan (MICARDIS) 80 mg, Oral, Daily    zolpidem (AMBIEN) 10 mg, Oral, Nightly PRN         HISTORY OF PRESENT ILLNESS:  The patient is here for 6-month follow-up and is a hypertension clinic patient.  She was supposed to be on PCSK9 inhibitor but discontinued it and wanted to think about  restarting it because she did not want to give herself the injections at her last office visit and wants to research it some more.  She still does not want to add any further medications if she does not have to.  She wore a 24-hour ambulatory blood monitor which showed average awake blood pressure was 143/79 mmHg, heart rate 62 bpm, average asleep blood pressure was 136/71 mmHg, 64 bpm, and average overall blood pressure 142/70 mmHg, 62 bpm.   Remotely she was on Bystolic but did not want to continue taking this medication because she did not feel like it was effective.   She wore a Holter monitor that was rather benign in August 2023 showing heart rate ranged between 57-126bpm with average 77bpm. There was no evidence of atrial fibrillation/flutter, ventricular tachycardia, pauses, or heart block.  Episode of SVT that only lasted for 12 beats.  Patient stopped taking her hydrochlorothiazide a couple weeks ago because she felt like she was getting too dehydrated taking it.  Blood pressures have been a little bit more elevated.  Patient does not want to restart on Bystolic.  She says that she has been eating more sweets lately.  She has a lot of back problems and recently had a steroid injection.  She is on limited activity due to this.  She denies any chest pain or increased shortness of breath but does notice palpitations sometimes at night.  She has noticed this a little bit more since she got her steroid injection.  This only happens a couple times a month though.  She also has a lot of insomnia.  She has been alternating Xanax on 1 night and Ambien on the next night for sleep.  Patient still does not want to be on any cholesterol medication and says that she wants to put this off until next year despite my recommendations.  Manual blood pressure reading in office today was 130/84.      ROS   All other systems reviewed and otherwise negative.      ECG 12 Lead    Date/Time: 12/23/2024 2:49 PM  Performed by:  "Sherrie Apodaca APRN    Authorized by: Sherrie Apodaca APRN  Rhythm comments: Normal sinus rhythm, normal ECG, 75 bpm, QRS 70 ms, QTc 428 ms,  ms, no change from last ECG September 2023             Objective:       Vitals:    12/23/24 1421 12/23/24 1423 12/23/24 1443   BP: 156/96 (!) 162/108 130/84   BP Location: Right arm Right arm Right arm   Patient Position: Sitting Standing Sitting   Cuff Size: Adult Adult    Pulse: 80 80    SpO2: 99%     Weight: 61.2 kg (135 lb)     Height: 157.5 cm (62\")       Body mass index is 24.69 kg/m².  Wt Readings from Last 2 Encounters:   12/23/24 61.2 kg (135 lb)   10/17/24 61 kg (134 lb 6.4 oz)        Constitutional:       Appearance: Healthy appearance. Not in distress.   Neck:      Vascular: No JVR. JVD normal.   Pulmonary:      Effort: Pulmonary effort is normal.      Breath sounds: Normal breath sounds. No wheezing. No rhonchi. No rales.   Chest:      Chest wall: Not tender to palpatation.   Cardiovascular:      PMI at left midclavicular line. Normal rate. Regular rhythm. Normal S1. Normal S2.       Murmurs: There is a grade 1/6 systolic murmur at the URSB.      No gallop.  No click. No rub.   Pulses:     Intact distal pulses.   Edema:     Peripheral edema absent.   Abdominal:      General: Bowel sounds are normal.      Palpations: Abdomen is soft.      Tenderness: There is no abdominal tenderness.   Musculoskeletal: Normal range of motion.         General: No tenderness. Skin:     General: Skin is warm and dry.   Neurological:      General: No focal deficit present.      Mental Status: Alert and oriented to person, place and time.           Lab Review:   Lab Results   Component Value Date    GLUCOSE 102 (H) 10/17/2024    BUN 41 (H) 10/17/2024    CREATININE 1.46 (H) 10/17/2024    EGFRIFNONA 51 (L) 11/19/2021    EGFRIFAFRI 58 (L) 11/19/2021    BCR 28.1 (H) 10/17/2024    CO2 23.1 10/17/2024    CALCIUM 10.1 10/17/2024    PROTENTOTREF 6.6 09/11/2023    ALBUMIN 4.5 " 10/17/2024    LABIL2 2.5 09/11/2023    AST 18 10/17/2024    ALT 7 10/17/2024       Lab Results   Component Value Date    WBC 7.49 10/17/2024    HGB 15.1 10/17/2024    HCT 42.6 10/17/2024    MCV 92.0 10/17/2024     10/17/2024       Lab Results   Component Value Date    HGBA1C 5.70 (H) 10/17/2024       Lab Results   Component Value Date    TSH 1.860 10/17/2024       Lab Results   Component Value Date    CHOL 234 (H) 10/17/2024    CHOL 226 (H) 06/11/2024     Lab Results   Component Value Date    TRIG 173 (H) 10/17/2024    TRIG 158 (H) 06/11/2024     Lab Results   Component Value Date    HDL 84 (H) 10/17/2024    HDL 86 (H) 06/11/2024     Lab Results   Component Value Date     (H) 10/17/2024     (H) 06/11/2024           Lab Results   Component Value Date    BNP 19 05/24/2014                 Results for orders placed in visit on 07/05/22    Adult Transthoracic Echo Complete W/ Cont if Necessary Per Protocol    Interpretation Summary  · Estimated left ventricular EF = 68% Left ventricular ejection fraction appears to be 66 - 70%. Left ventricular systolic function is normal.  · Left ventricular diastolic function was normal.  · Estimated right ventricular systolic pressure from tricuspid regurgitation is normal (<35 mmHg).  · Normal right ventricular cavity size, wall thickness, systolic function and septal motion noted.  · No evidence of pulmonary hypertension is present.  · There is no evidence of pericardial effusion.  · No significant structural or functional valvular abnormalities demonstrated.            Advance Care Planning   ACP discussion was held with the patient during this visit. Patient does not have an advance directive, declines further assistance.      Assessment:     Overall continued acceptable course with no new interim cardiopulmonary complaints with fair functional status on limited activity. We will defer additional diagnostic or therapeutic intervention from a cardiac  perspective at this time.  ECG normal in office today.  Recommended PCSK9 inhibitor but patient declined.       Diagnosis Plan   1. Essential hypertension  Diltiazem 120 mg nightly  BMP, mag in 1 week since patient recently took herself off of hydrochlorothiazide a couple weeks ago  Patient to monitor blood pressure and heart rate for 1 week after starting diltiazem and call back with readings      2. Hyperlipidemia LDL goal <100  Abnormal lipid panel October 2024, recommended PCSK9 inhibitor but patient declined      3. Prediabetes  Heart healthy diet, physical activity as tolerated               Plan:         Patient to continue current medications and close follow up with the above providers.  Tentative cardiology follow up in March 2025 in the hypertension clinic or patient may return sooner PRN.  Diltiazem 120 mg nightly  BMP, mag in 1 week since patient recently took herself off of hydrochlorothiazide a couple weeks ago  Patient to monitor blood pressure and heart rate for 1 week after starting diltiazem and call back with readings  Recommended PCSK9 inhibitor but patient declined      Electronically signed by WELLINGTON Holguin, 12/23/24, 2:51 PM EST.

## 2024-12-23 ENCOUNTER — OFFICE VISIT (OUTPATIENT)
Dept: CARDIOLOGY | Facility: CLINIC | Age: 74
End: 2024-12-23
Payer: MEDICARE

## 2024-12-23 VITALS
OXYGEN SATURATION: 99 % | SYSTOLIC BLOOD PRESSURE: 130 MMHG | WEIGHT: 135 LBS | BODY MASS INDEX: 24.84 KG/M2 | DIASTOLIC BLOOD PRESSURE: 84 MMHG | HEIGHT: 62 IN | HEART RATE: 80 BPM

## 2024-12-23 DIAGNOSIS — E78.5 HYPERLIPIDEMIA LDL GOAL <100: ICD-10-CM

## 2024-12-23 DIAGNOSIS — I10 ESSENTIAL HYPERTENSION: Primary | ICD-10-CM

## 2024-12-23 DIAGNOSIS — R73.03 PREDIABETES: ICD-10-CM

## 2024-12-23 PROCEDURE — 3075F SYST BP GE 130 - 139MM HG: CPT | Performed by: NURSE PRACTITIONER

## 2024-12-23 PROCEDURE — 99214 OFFICE O/P EST MOD 30 MIN: CPT | Performed by: NURSE PRACTITIONER

## 2024-12-23 PROCEDURE — 1159F MED LIST DOCD IN RCRD: CPT | Performed by: NURSE PRACTITIONER

## 2024-12-23 PROCEDURE — 1160F RVW MEDS BY RX/DR IN RCRD: CPT | Performed by: NURSE PRACTITIONER

## 2024-12-23 PROCEDURE — 3079F DIAST BP 80-89 MM HG: CPT | Performed by: NURSE PRACTITIONER

## 2024-12-23 PROCEDURE — 93000 ELECTROCARDIOGRAM COMPLETE: CPT | Performed by: NURSE PRACTITIONER

## 2024-12-23 RX ORDER — DILTIAZEM HYDROCHLORIDE 120 MG/1
120 CAPSULE, EXTENDED RELEASE ORAL NIGHTLY
Qty: 90 CAPSULE | Refills: 3 | Status: SHIPPED | OUTPATIENT
Start: 2024-12-23

## 2024-12-23 NOTE — LETTER
December 23, 2024     Aspen Salas DO  [address] [facility] [address]    Patient: Deidre Gomez   YOB: 1950   Date of Visit: 12/23/2024     Dear Aspen Salas DO:       Thank you for referring Deidre Gomez to me for evaluation. Below are the relevant portions of my assessment and plan of care.    If you have questions, please do not hesitate to call me. I look forward to following Deidre along with you.         Sincerely,        WELLINGTON Holguin        CC: No Recipients    Sherrie Apodaca, WELLINGTON  12/23/24 1446  Incomplete      Subjective:     Encounter Date:12/18/2024      Patient ID: Deidre Gomez is a 74 y.o.  white female from [place], [place], retired teacher at [employer].     REFERRING PHYSICIAN: Aspen Salas DO  NEPHROLOGIST: Constance Looney MD.    Chief Complaint:   Chief Complaint   Patient presents with   • Essential hypertension     Problem List:  Hypertension  Intolerant to olmesartan, reports side effects with almost all blood pressure medications attempted, clonidine ineffective, says diuretics did not help, amlodipine with hair loss  Apparently acceptable renal artery duplex with her nephrologist in 2021-  24-hour ambulatory blood monitor July 2022 which showed average awake blood pressure was 143/79 mmHg, heart rate 62 bpm, average asleep blood pressure was 136/71 mmHg, 64 bpm, and average overall blood pressure 142/70 mmHg, 62 bpm.  Echocardiogram 7/15/2022: LVEF 68%, RVSP less than 35 mmHg, no significant structural or functional valvular abnormalities demonstrated, diastolic function normal  Residual class I symptoms with normal ECG July 2022, CCS class 0 angina.NYHA class I-II SOB on exertion March 2023, August 2023 with normal ECG  Hyperlipidemia; declined statins, ASCVD 10-year risk is 17.6 %, 9.6 % if treated, wants to think about PCSK9 inhibitor August 2023, December 2023, June 2024  Anxiety and depression  Chronic kidney disease  stage III with apparent abnormal acceptable renal ultrasound in 2021- data deficit  Systolic heart murmur  Intermittent palpitations  Lumbar back pain  ADHD  RLS  Osteoporosis  At risk for sleep apnea/chronic insomnia with remote sleep study-data deficit, acceptable outpatient sleep oximetry monitor July 2022 with no recommendations for nocturnal oxygen.  Former tobacco use; 1 pack/day x 50 years, quit in 2019  History of TIA x2  Abnormal mammogram with apparent negative biopsy-data deficit  Dizziness, Holter monitor benign August 2023 showing heart rate ranged between 57-126bpm with average 77bpm. There was no evidence of atrial fibrillation/flutter, ventricular tachycardia, pauses, or heart block. Episode of SVT that only lasted for 12 beats.   [facility] ED visit September 2023 for hyperkalemia  Prediabetes; hemoglobin A1c 5.8% October 2023  Surgical history:  Appendectomy  Breast biopsy/cyst aspiration  Cosmetic surgery  Hysterectomy  Tubal ligation  Breast biopsy    Allergies   Allergen Reactions   • Amlodipine Other (See Comments)     Hair loss   • Codeine Nausea Only   • Olmesartan    • Penicillin G Benzathine Provider Review Needed   • Cephalexin Rash   • Penicillin G Rash   • Penicillins Rash       Current Outpatient Medications   Medication Instructions   • acetaminophen (TYLENOL 8 HOUR) 650 mg, Oral, Every 8 Hours PRN   • acyclovir (ZOVIRAX) 400 MG tablet Take 1 tablet by mouth As Needed.   • ALPRAZolam (XANAX) 1 mg, Oral, Nightly PRN   • B Complex Vitamins (vitamin b complex) capsule capsule Daily   • Biotin 57103 MCG tablet Take  by mouth.   • buPROPion XL (WELLBUTRIN XL) 300 mg, Oral, Daily   • CHOLECALCIFEROL PO 10,000 Units, Every 7 Days   • Diclofenac Sodium (VOLTAREN) 1 % gel gel Apply 4 g topically to the appropriate area as directed As Needed.   • Ergocalciferol (VITAMIN D2 PO) Vitamin D2 1,250 mcg (50,000 unit) capsule   • fluocinonide (LIDEX) 0.05 % cream 2 Times Daily   • hydroCHLOROthiazide 25 mg,  Oral, Daily   • HYDROcodone-acetaminophen (NORCO) 7.5-325 MG per tablet 1 tablet, As Needed   • hydrOXYzine pamoate (VISTARIL) 25 MG capsule TAKE 1 CAPSULE  AT NIGHT AS NEEDED FOR ANXIETY.   • mupirocin (BACTROBAN) 2 % ointment Daily   • PreviDent 5000 Booster Plus 1.1 % paste    • telmisartan (MICARDIS) 80 mg, Oral, Daily   • zolpidem (AMBIEN) 10 mg, Oral, Nightly PRN         HISTORY OF PRESENT ILLNESS:  The patient is here for 6-month follow-up and is a hypertension clinic patient.  She was supposed to be on PCSK9 inhibitor but discontinued it and wanted to think about restarting it because she did not want to give herself the injections at her last office visit and wants to research it some more.  She still does not want to add any further medications if she does not have to.  She wore a 24-hour ambulatory blood monitor which showed average awake blood pressure was 143/79 mmHg, heart rate 62 bpm, average asleep blood pressure was 136/71 mmHg, 64 bpm, and average overall blood pressure 142/70 mmHg, 62 bpm.   Remotely she was on Bystolic but did not want to continue taking this medication because she did not feel like it was effective.   She wore a Holter monitor that was rather benign in August 2023 showing heart rate ranged between 57-126bpm with average 77bpm. There was no evidence of atrial fibrillation/flutter, ventricular tachycardia, pauses, or heart block.  Episode of SVT that only lasted for 12 beats.  Patient stopped taking her hydrochlorothiazide a couple weeks ago because she felt like she was getting too dehydrated taking it.  Blood pressures have been a little bit more elevated.  Patient does not want to restart on Bystolic.  She says that she has been eating more sweets lately.  She has a lot of back problems and recently had a steroid injection.  She is on limited activity due to this.  She denies any chest pain or increased shortness of breath but does notice palpitations sometimes at night.  She  "has noticed this a little bit more since she got her steroid injection.  She also has a lot of insomnia.  She has been alternating Xanax in 1 night and Ambien on the next night for sleep.  Patient still does not want to be on any cholesterol medication says that she wants to put this off until next year despite my recommendations.      ROS   All other systems reviewed and otherwise negative.    Procedures       Objective:       Vitals:    12/23/24 1421 12/23/24 1423 12/23/24 1443   BP: 156/96 (!) 162/108 130/84   BP Location: Right arm Right arm Right arm   Patient Position: Sitting Standing Sitting   Cuff Size: Adult Adult    Pulse: 80 80    SpO2: 99%     Weight: 61.2 kg (135 lb)     Height: 157.5 cm (62\")       Body mass index is 24.69 kg/m².  Wt Readings from Last 2 Encounters:   12/23/24 61.2 kg (135 lb)   10/17/24 61 kg (134 lb 6.4 oz)        Constitutional:       Appearance: Healthy appearance. Not in distress.   Neck:      Vascular: No JVR. JVD normal.   Pulmonary:      Effort: Pulmonary effort is normal.      Breath sounds: Normal breath sounds. No wheezing. No rhonchi. No rales.   Chest:      Chest wall: Not tender to palpatation.   Cardiovascular:      PMI at left midclavicular line. Normal rate. Regular rhythm. Normal S1. Normal S2.       Murmurs: There is a grade 1/6 systolic murmur at the URSB.      No gallop.  No click. No rub.   Pulses:     Intact distal pulses.   Edema:     Peripheral edema absent.   Abdominal:      General: Bowel sounds are normal.      Palpations: Abdomen is soft.      Tenderness: There is no abdominal tenderness.   Musculoskeletal: Normal range of motion.         General: No tenderness. Skin:     General: Skin is warm and dry.   Neurological:      General: No focal deficit present.      Mental Status: Alert and oriented to person, place and time.           Lab Review:   Lab Results   Component Value Date    GLUCOSE 102 (H) 10/17/2024    BUN 41 (H) 10/17/2024    CREATININE 1.46 (H) " 10/17/2024    EGFRIFNONA 51 (L) 11/19/2021    EGFRIFAFRI 58 (L) 11/19/2021    BCR 28.1 (H) 10/17/2024    CO2 23.1 10/17/2024    CALCIUM 10.1 10/17/2024    PROTENTOTREF 6.6 09/11/2023    ALBUMIN 4.5 10/17/2024    LABIL2 2.5 09/11/2023    AST 18 10/17/2024    ALT 7 10/17/2024       Lab Results   Component Value Date    WBC 7.49 10/17/2024    HGB 15.1 10/17/2024    HCT 42.6 10/17/2024    MCV 92.0 10/17/2024     10/17/2024       Lab Results   Component Value Date    HGBA1C 5.70 (H) 10/17/2024       Lab Results   Component Value Date    TSH 1.860 10/17/2024       Lab Results   Component Value Date    CHOL 234 (H) 10/17/2024    CHOL 226 (H) 06/11/2024     Lab Results   Component Value Date    TRIG 173 (H) 10/17/2024    TRIG 158 (H) 06/11/2024     Lab Results   Component Value Date    HDL 84 (H) 10/17/2024    HDL 86 (H) 06/11/2024     Lab Results   Component Value Date     (H) 10/17/2024     (H) 06/11/2024           Lab Results   Component Value Date    BNP 19 05/24/2014                 Results for orders placed in visit on 07/05/22    Adult Transthoracic Echo Complete W/ Cont if Necessary Per Protocol    Interpretation Summary  · Estimated left ventricular EF = 68% Left ventricular ejection fraction appears to be 66 - 70%. Left ventricular systolic function is normal.  · Left ventricular diastolic function was normal.  · Estimated right ventricular systolic pressure from tricuspid regurgitation is normal (<35 mmHg).  · Normal right ventricular cavity size, wall thickness, systolic function and septal motion noted.  · No evidence of pulmonary hypertension is present.  · There is no evidence of pericardial effusion.  · No significant structural or functional valvular abnormalities demonstrated.            Advance Care Planning  {Advance Directive Status:07852}      Assessment:     Overall continued acceptable course with no new interim cardiopulmonary complaints with good functional status. We will  defer additional diagnostic or therapeutic intervention from a cardiac perspective at this time.       Diagnosis Plan   1. Essential hypertension  Controlled, continue current cardiac medications      2. Hyperlipidemia LDL goal <100  Abnormal lipid panel October 2024      3. Prediabetes  Heart healthy diet, physical activity as tolerated               Plan:         Patient to continue current medications and close follow up with the above providers.  Tentative cardiology follow up in May 2025 in the hypertension clinic or patient may return sooner PRN.          Sherrie Apodaca, APRN  12/19/24 1256  Sign when Signing Visit      Subjective:     Encounter Date:12/18/2024      Patient ID: Deidre Gomez is a 74 y.o.  white female from [place], [place], retired teacher at [employer].     REFERRING PHYSICIAN: Aspen Salas DO  NEPHROLOGIST: Constance Looney MD.    Chief Complaint: No chief complaint on file.    Problem List:  Hypertension  Intolerant to olmesartan, reports side effects with almost all blood pressure medications attempted, clonidine ineffective, says diuretics did not help, amlodipine with hair loss  Apparently acceptable renal artery duplex with her nephrologist in 2021-  24-hour ambulatory blood monitor July 2022 which showed average awake blood pressure was 143/79 mmHg, heart rate 62 bpm, average asleep blood pressure was 136/71 mmHg, 64 bpm, and average overall blood pressure 142/70 mmHg, 62 bpm.  Echocardiogram 7/15/2022: LVEF 68%, RVSP less than 35 mmHg, no significant structural or functional valvular abnormalities demonstrated, diastolic function normal  Residual class I symptoms with normal ECG July 2022, CCS class 0 angina.NYHA class I-II SOB on exertion March 2023, August 2023 with normal ECG  Hyperlipidemia; declined statins, ASCVD 10-year risk is 17.6 %, 9.6 % if treated, wants to think about PCSK9 inhibitor August 2023, December 2023, June 2024  Anxiety and depression  Chronic  kidney disease stage III with apparent abnormal acceptable renal ultrasound in 2021- data deficit  Systolic heart murmur  Intermittent palpitations  Lumbar back pain  ADHD  RLS  Osteoporosis  At risk for sleep apnea/chronic insomnia with remote sleep study-data deficit, acceptable outpatient sleep oximetry monitor July 2022 with no recommendations for nocturnal oxygen.  Former tobacco use; 1 pack/day x 50 years, quit in 2019  History of TIA x2  Abnormal mammogram with apparent negative biopsy-data deficit  Dizziness, Holter monitor benign August 2023 showing heart rate ranged between 57-126bpm with average 77bpm. There was no evidence of atrial fibrillation/flutter, ventricular tachycardia, pauses, or heart block. Episode of SVT that only lasted for 12 beats.   [facility] ED visit September 2023 for hyperkalemia  Prediabetes; hemoglobin A1c 5.8% October 2023  Surgical history:  Appendectomy  Breast biopsy/cyst aspiration  Cosmetic surgery  Hysterectomy  Tubal ligation  Breast biopsy    Allergies   Allergen Reactions   • Amlodipine Other (See Comments)     Hair loss   • Codeine Nausea Only   • Olmesartan    • Cephalexin Rash   • Penicillins Rash       Current Outpatient Medications   Medication Instructions   • acetaminophen (TYLENOL 8 HOUR) 650 mg, Oral, Every 8 Hours PRN   • acyclovir (ZOVIRAX) 400 MG tablet Take 1 tablet by mouth As Needed.   • ALPRAZolam (XANAX) 1 mg, Oral, Nightly PRN   • B Complex Vitamins (vitamin b complex) capsule capsule Daily   • Biotin 03591 MCG tablet Take  by mouth.   • buPROPion XL (WELLBUTRIN XL) 300 mg, Oral, Daily   • CHOLECALCIFEROL PO 10,000 Units, Every 7 Days   • Diclofenac Sodium (VOLTAREN) 1 % gel gel Apply 4 g topically to the appropriate area as directed As Needed.   • Ergocalciferol (VITAMIN D2 PO) Vitamin D2 1,250 mcg (50,000 unit) capsule   • fluocinonide (LIDEX) 0.05 % cream 2 Times Daily   • hydroCHLOROthiazide 25 mg, Oral, Daily   • HYDROcodone-acetaminophen (NORCO) 7.5-325  MG per tablet 1 tablet, As Needed   • hydrOXYzine pamoate (VISTARIL) 25 MG capsule TAKE 1 CAPSULE  AT NIGHT AS NEEDED FOR ANXIETY.   • mupirocin (BACTROBAN) 2 % ointment Daily   • PreviDent 5000 Booster Plus 1.1 % paste    • telmisartan (MICARDIS) 80 mg, Oral, Daily   • zolpidem (AMBIEN) 10 mg, Oral, Nightly PRN         HISTORY OF PRESENT ILLNESS:  The patient is here for 6-month follow-up and is a hypertension clinic patient.  She was supposed to be on PCSK9 inhibitor but discontinued it and wanted to think about restarting it because she did not want to give herself the injections at her last office visit and wants to research it some more.  She still does not want to add any further medications if she does not have to.  She wore a 24-hour ambulatory blood monitor which showed average awake blood pressure was 143/79 mmHg, heart rate 62 bpm, average asleep blood pressure was 136/71 mmHg, 64 bpm, and average overall blood pressure 142/70 mmHg, 62 bpm.   Remotely she was on Bystolic but did not want to continue taking this medication because she did not feel like it was effective.   She wore a Holter monitor that was rather benign in August 2023 showing heart rate ranged between 57-126bpm with average 77bpm. There was no evidence of atrial fibrillation/flutter, ventricular tachycardia, pauses, or heart block.  Episode of SVT that only lasted for 12 beats.        ROS   All other systems reviewed and otherwise negative.    Procedures       Objective:       There were no vitals filed for this visit.  There is no height or weight on file to calculate BMI.  Wt Readings from Last 2 Encounters:   10/17/24 61 kg (134 lb 6.4 oz)   06/13/24 61.6 kg (135 lb 12.8 oz)        Constitutional:       Appearance: Healthy appearance. Not in distress.   Neck:      Vascular: No JVR. JVD normal.   Pulmonary:      Effort: Pulmonary effort is normal.      Breath sounds: Normal breath sounds. No wheezing. No rhonchi. No rales.   Chest:       Chest wall: Not tender to palpatation.   Cardiovascular:      PMI at left midclavicular line. Normal rate. Regular rhythm. Normal S1. Normal S2.       Murmurs: There is a grade 1/6 systolic murmur at the URSB.      No gallop.  No click. No rub.   Pulses:     Intact distal pulses.   Edema:     Peripheral edema absent.   Abdominal:      General: Bowel sounds are normal.      Palpations: Abdomen is soft.      Tenderness: There is no abdominal tenderness.   Musculoskeletal: Normal range of motion.         General: No tenderness. Skin:     General: Skin is warm and dry.   Neurological:      General: No focal deficit present.      Mental Status: Alert and oriented to person, place and time.           Lab Review:   Lab Results   Component Value Date    GLUCOSE 102 (H) 10/17/2024    BUN 41 (H) 10/17/2024    CREATININE 1.46 (H) 10/17/2024    EGFRIFNONA 51 (L) 11/19/2021    EGFRIFAFRI 58 (L) 11/19/2021    BCR 28.1 (H) 10/17/2024    CO2 23.1 10/17/2024    CALCIUM 10.1 10/17/2024    PROTENTOTREF 6.6 09/11/2023    ALBUMIN 4.5 10/17/2024    LABIL2 2.5 09/11/2023    AST 18 10/17/2024    ALT 7 10/17/2024       Lab Results   Component Value Date    WBC 7.49 10/17/2024    HGB 15.1 10/17/2024    HCT 42.6 10/17/2024    MCV 92.0 10/17/2024     10/17/2024       Lab Results   Component Value Date    HGBA1C 5.70 (H) 10/17/2024       Lab Results   Component Value Date    TSH 1.860 10/17/2024       Lab Results   Component Value Date    CHOL 234 (H) 10/17/2024    CHOL 226 (H) 06/11/2024     Lab Results   Component Value Date    TRIG 173 (H) 10/17/2024    TRIG 158 (H) 06/11/2024     Lab Results   Component Value Date    HDL 84 (H) 10/17/2024    HDL 86 (H) 06/11/2024     Lab Results   Component Value Date     (H) 10/17/2024     (H) 06/11/2024           Lab Results   Component Value Date    BNP 19 05/24/2014                 Results for orders placed in visit on 07/05/22    Adult Transthoracic Echo Complete W/ Cont if  Necessary Per Protocol    Interpretation Summary  · Estimated left ventricular EF = 68% Left ventricular ejection fraction appears to be 66 - 70%. Left ventricular systolic function is normal.  · Left ventricular diastolic function was normal.  · Estimated right ventricular systolic pressure from tricuspid regurgitation is normal (<35 mmHg).  · Normal right ventricular cavity size, wall thickness, systolic function and septal motion noted.  · No evidence of pulmonary hypertension is present.  · There is no evidence of pericardial effusion.  · No significant structural or functional valvular abnormalities demonstrated.            Advance Care Planning  {Advance Directive Status:93003}      Assessment:     Overall continued acceptable course with no new interim cardiopulmonary complaints with good functional status. We will defer additional diagnostic or therapeutic intervention from a cardiac perspective at this time.       Diagnosis Plan   1. Essential hypertension        2. Hyperlipidemia LDL goal <100        3. Prediabetes                 Plan:         Patient to continue current medications and close follow up with the above providers.  Tentative cardiology follow up in June 2025 in the hypertension clinic or patient may return sooner PRN.

## 2025-01-03 RX ORDER — TELMISARTAN 80 MG/1
80 TABLET ORAL DAILY
Qty: 30 TABLET | Refills: 1 | Status: SHIPPED | OUTPATIENT
Start: 2025-01-03

## 2025-01-07 RX ORDER — TELMISARTAN 80 MG/1
80 TABLET ORAL DAILY
Qty: 30 TABLET | Refills: 6 | Status: SHIPPED | OUTPATIENT
Start: 2025-01-07

## 2025-01-22 ENCOUNTER — TELEPHONE (OUTPATIENT)
Dept: CARDIOLOGY | Facility: CLINIC | Age: 75
End: 2025-01-22
Payer: MEDICARE

## 2025-01-22 NOTE — TELEPHONE ENCOUNTER
PT called stating that she is having issues with the diltiazem. She is having headaches that linger, urination frequency, and it has not helped to bring her BP down. Denies any Cp or SOB. I asked pt to call back with BP and HR readings.  Awaiting a call back.    Please advise

## 2025-01-28 DIAGNOSIS — I10 ESSENTIAL HYPERTENSION: Primary | ICD-10-CM

## 2025-01-28 RX ORDER — NEBIVOLOL 2.5 MG/1
2.5 TABLET ORAL NIGHTLY
Qty: 90 TABLET | Refills: 1 | Status: SHIPPED | OUTPATIENT
Start: 2025-01-28

## 2025-02-13 PROBLEM — M79.641 PAIN OF RIGHT HAND: Status: ACTIVE | Noted: 2024-11-15

## 2025-03-14 ENCOUNTER — OFFICE VISIT (OUTPATIENT)
Dept: INTERNAL MEDICINE | Facility: CLINIC | Age: 75
End: 2025-03-14
Payer: MEDICARE

## 2025-03-14 ENCOUNTER — LAB (OUTPATIENT)
Dept: LAB | Facility: HOSPITAL | Age: 75
End: 2025-03-14
Payer: MEDICARE

## 2025-03-14 VITALS
TEMPERATURE: 97.7 F | HEIGHT: 62 IN | OXYGEN SATURATION: 98 % | WEIGHT: 133 LBS | DIASTOLIC BLOOD PRESSURE: 84 MMHG | SYSTOLIC BLOOD PRESSURE: 144 MMHG | BODY MASS INDEX: 24.48 KG/M2

## 2025-03-14 DIAGNOSIS — I10 ESSENTIAL HYPERTENSION: Primary | ICD-10-CM

## 2025-03-14 DIAGNOSIS — F41.9 ANXIETY AND DEPRESSION: ICD-10-CM

## 2025-03-14 DIAGNOSIS — R73.03 PREDIABETES: ICD-10-CM

## 2025-03-14 DIAGNOSIS — F32.A ANXIETY AND DEPRESSION: ICD-10-CM

## 2025-03-14 DIAGNOSIS — I10 ESSENTIAL HYPERTENSION: ICD-10-CM

## 2025-03-14 DIAGNOSIS — F51.04 CHRONIC INSOMNIA: ICD-10-CM

## 2025-03-14 DIAGNOSIS — Z78.0 POSTMENOPAUSE: ICD-10-CM

## 2025-03-14 LAB
ALBUMIN SERPL-MCNC: 4.2 G/DL (ref 3.5–5.2)
ALBUMIN/GLOB SERPL: 1.5 G/DL
ALP SERPL-CCNC: 65 U/L (ref 39–117)
ALT SERPL W P-5'-P-CCNC: 6 U/L (ref 1–33)
ANION GAP SERPL CALCULATED.3IONS-SCNC: 10.1 MMOL/L (ref 5–15)
AST SERPL-CCNC: 22 U/L (ref 1–32)
BILIRUB SERPL-MCNC: 0.4 MG/DL (ref 0–1.2)
BUN SERPL-MCNC: 21 MG/DL (ref 8–23)
BUN/CREAT SERPL: 14.7 (ref 7–25)
CALCIUM SPEC-SCNC: 10 MG/DL (ref 8.6–10.5)
CHLORIDE SERPL-SCNC: 105 MMOL/L (ref 98–107)
CO2 SERPL-SCNC: 23.9 MMOL/L (ref 22–29)
CREAT SERPL-MCNC: 1.43 MG/DL (ref 0.57–1)
EGFRCR SERPLBLD CKD-EPI 2021: 38.6 ML/MIN/1.73
GLOBULIN UR ELPH-MCNC: 2.8 GM/DL
GLUCOSE SERPL-MCNC: 104 MG/DL (ref 65–99)
POTASSIUM SERPL-SCNC: 5.7 MMOL/L (ref 3.5–5.2)
PROT SERPL-MCNC: 7 G/DL (ref 6–8.5)
SODIUM SERPL-SCNC: 139 MMOL/L (ref 136–145)

## 2025-03-14 PROCEDURE — 36415 COLL VENOUS BLD VENIPUNCTURE: CPT

## 2025-03-14 PROCEDURE — 83036 HEMOGLOBIN GLYCOSYLATED A1C: CPT

## 2025-03-14 PROCEDURE — 80053 COMPREHEN METABOLIC PANEL: CPT

## 2025-03-14 RX ORDER — GABAPENTIN 300 MG/1
CAPSULE ORAL
COMMUNITY
Start: 2025-02-25

## 2025-03-14 RX ORDER — HYDROXYZINE PAMOATE 25 MG/1
25 CAPSULE ORAL NIGHTLY PRN
Qty: 30 CAPSULE | Refills: 2 | Status: CANCELLED | OUTPATIENT
Start: 2025-03-14

## 2025-03-14 RX ORDER — DILTIAZEM HYDROCHLORIDE 120 MG/1
CAPSULE, COATED, EXTENDED RELEASE ORAL
COMMUNITY
Start: 2024-12-24

## 2025-03-14 RX ORDER — ZOLPIDEM TARTRATE 10 MG/1
10 TABLET ORAL NIGHTLY PRN
Qty: 30 TABLET | Refills: 2 | Status: SHIPPED | OUTPATIENT
Start: 2025-03-14

## 2025-03-14 RX ORDER — BUPROPION HYDROCHLORIDE 300 MG/1
300 TABLET ORAL DAILY
Qty: 90 TABLET | Refills: 0 | Status: SHIPPED | OUTPATIENT
Start: 2025-03-14

## 2025-03-14 RX ORDER — ALPRAZOLAM 1 MG/1
1 TABLET ORAL NIGHTLY PRN
Qty: 30 TABLET | Refills: 2 | Status: SHIPPED | OUTPATIENT
Start: 2025-03-14

## 2025-03-14 RX ORDER — KETOCONAZOLE 20 MG/ML
SHAMPOO, SUSPENSION TOPICAL
COMMUNITY
Start: 2025-01-27

## 2025-03-14 NOTE — PROGRESS NOTES
"Subjective   Deidre Gomez is a 74 y.o. female.   Chief Complaint   Patient presents with    Hyperlipidemia    Anxiety    Depression    Insomnia    Prediabetes       History of Present Illness   Here for f/u on chronic conditions: HLP, anxiety and depression, insomnia, and prediabetes. HTN better control ( but still increased). with Micardis and Bysrtolic Anxety and depression controlled with Wellbutrin and Xanax. Insomnia controlled with Xanax. Prediabetes controlled with low carb diet.   The following portions of the patient's history were reviewed and updated as appropriate: allergies, current medications, past family history, past medical history, past social history, past surgical history, and problem list.    Review of Systems   Constitutional:  Positive for fatigue. Negative for fever.   Respiratory:  Negative for cough and shortness of breath.    Cardiovascular:  Negative for chest pain and leg swelling.   Gastrointestinal:  Negative for abdominal pain.   Psychiatric/Behavioral:  Negative for confusion.      /84   Temp 97.7 °F (36.5 °C) (Temporal)   Ht 157.5 cm (62.01\")   Wt 60.3 kg (133 lb)   LMP  (LMP Unknown)   SpO2 98%   BMI 24.32 kg/m²     Objective   Physical Exam  Cardiovascular:      Rate and Rhythm: Normal rate and regular rhythm.   Pulmonary:      Effort: No respiratory distress.      Breath sounds: No wheezing.   Neurological:      Mental Status: She is alert and oriented to person, place, and time.   Psychiatric:         Behavior: Behavior normal.         Assessment & Plan   Diagnoses and all orders for this visit:    1. Essential hypertension (Primary)  -     Comprehensive Metabolic Panel; Future  BP up today but is trending down. She is working with Cardiology to lower.  She did not tolerated the Diltizaem that was added. She is on Bystolic and Micardis. Has 3 week BP f/u with cardio.   2. Chronic insomnia  -     zolpidem (AMBIEN) 10 MG tablet; Take 1 tablet by mouth At " Night As Needed for Sleep.  Dispense: 30 tablet; Refill: 2  %mg does not help. Can only sleep if has 10 mg of Ambien  3. Anxiety and depression  -     buPROPion XL (WELLBUTRIN XL) 300 MG 24 hr tablet; Take 1 tablet by mouth Daily.  Dispense: 90 tablet; Refill: 0  -     ALPRAZolam (XANAX) 1 MG tablet; Take 1 tablet by mouth At Night As Needed for Anxiety.  Dispense: 30 tablet; Refill: 2  Continue Wellbutrin and Xanax. Does not want counseling.   4. Prediabetes  -     Hemoglobin A1c; Future  Low carb diet  5. Postmenopause  -     DEXA Bone Density Axial; Future

## 2025-03-15 LAB — HBA1C MFR BLD: 5.7 % (ref 4.8–5.6)

## 2025-03-18 DIAGNOSIS — F51.04 CHRONIC INSOMNIA: ICD-10-CM

## 2025-03-18 NOTE — TELEPHONE ENCOUNTER
Caller: Deidre Gomez    Relationship: Self    Best call back number: [phone]     Requested Prescriptions:   Requested Prescriptions     Pending Prescriptions Disp Refills    zolpidem (AMBIEN) 10 MG tablet 30 tablet 2     Sig: Take 1 tablet by mouth At Night As Needed for Sleep.        Pharmacy where request should be sent: [id number] [id number]  AT [facility] [address]  - [phone] [facility] [phone]      Last office visit with prescribing clinician: 3/14/2025   Last telemedicine visit with prescribing clinician: Visit date not found   Next office visit with prescribing clinician: 7/18/2025     Additional details provided by patient: PATIENT NEEDS THIS PRESCRIPTION MOVED TO [facility] FROM [facility].     Does the patient have less than a 3 day supply:  [] Yes  [x] No    Would you like a call back once the refill request has been completed: [] Yes [x] No    If the office needs to give you a call back, can they leave a voicemail: [] Yes [x] No    Sheila Christianson Rep   03/18/25 16:27 EDT

## 2025-03-19 RX ORDER — ZOLPIDEM TARTRATE 10 MG/1
10 TABLET ORAL NIGHTLY PRN
Qty: 30 TABLET | Refills: 2 | Status: SHIPPED | OUTPATIENT
Start: 2025-03-19

## 2025-03-19 NOTE — TELEPHONE ENCOUNTER
Per patient: PATIENT NEEDS THIS PRESCRIPTION MOVED TO [facility] FROM [facility].     Ok to send and I will cancel prescription from other pharmacy

## 2025-03-24 ENCOUNTER — LAB (OUTPATIENT)
Dept: LAB | Facility: HOSPITAL | Age: 75
End: 2025-03-24
Payer: MEDICARE

## 2025-03-24 DIAGNOSIS — E87.5 HYPERKALEMIA: ICD-10-CM

## 2025-03-24 LAB
ALBUMIN SERPL-MCNC: 4.2 G/DL (ref 3.5–5.2)
ALBUMIN/GLOB SERPL: 1.8 G/DL
ALP SERPL-CCNC: 72 U/L (ref 39–117)
ALT SERPL W P-5'-P-CCNC: 7 U/L (ref 1–33)
ANION GAP SERPL CALCULATED.3IONS-SCNC: 14.4 MMOL/L (ref 5–15)
AST SERPL-CCNC: 21 U/L (ref 1–32)
BILIRUB SERPL-MCNC: 0.2 MG/DL (ref 0–1.2)
BUN SERPL-MCNC: 24 MG/DL (ref 8–23)
BUN/CREAT SERPL: 16.7 (ref 7–25)
CALCIUM SPEC-SCNC: 10 MG/DL (ref 8.6–10.5)
CHLORIDE SERPL-SCNC: 102 MMOL/L (ref 98–107)
CO2 SERPL-SCNC: 23.6 MMOL/L (ref 22–29)
CREAT SERPL-MCNC: 1.44 MG/DL (ref 0.57–1)
EGFRCR SERPLBLD CKD-EPI 2021: 38.2 ML/MIN/1.73
GLOBULIN UR ELPH-MCNC: 2.3 GM/DL
GLUCOSE SERPL-MCNC: 96 MG/DL (ref 65–99)
POTASSIUM SERPL-SCNC: 5.2 MMOL/L (ref 3.5–5.2)
PROT SERPL-MCNC: 6.5 G/DL (ref 6–8.5)
SODIUM SERPL-SCNC: 140 MMOL/L (ref 136–145)

## 2025-03-24 PROCEDURE — 80053 COMPREHEN METABOLIC PANEL: CPT

## 2025-03-25 ENCOUNTER — RESULTS FOLLOW-UP (OUTPATIENT)
Dept: INTERNAL MEDICINE | Facility: CLINIC | Age: 75
End: 2025-03-25
Payer: MEDICARE

## 2025-05-19 DIAGNOSIS — F32.A ANXIETY AND DEPRESSION: ICD-10-CM

## 2025-05-19 DIAGNOSIS — F41.9 ANXIETY AND DEPRESSION: ICD-10-CM

## 2025-05-19 RX ORDER — BUPROPION HYDROCHLORIDE 300 MG/1
300 TABLET ORAL DAILY
Qty: 90 TABLET | Refills: 0 | Status: SHIPPED | OUTPATIENT
Start: 2025-05-19

## 2025-05-19 RX ORDER — BUPROPION HYDROCHLORIDE 300 MG/1
300 TABLET ORAL DAILY
Qty: 90 TABLET | Refills: 0 | OUTPATIENT
Start: 2025-05-19

## 2025-05-19 NOTE — PROGRESS NOTES
Subjective:     Encounter Date:05/22/2025      Patient ID: Deidre Gomez is a 74 y.o.  white female from [place], [place], retired teacher at [employer].     REFERRING PHYSICIAN: Aspen Salas DO  NEPHROLOGIST: Constance Looney MD.    Chief Complaint:   Chief Complaint   Patient presents with    Essential hypertension     Problem List:  Hypertension  Intolerant to olmesartan, reports side effects with almost all blood pressure medications attempted, clonidine ineffective, says diuretics did not help, amlodipine with hair loss  Apparently acceptable renal artery duplex with her nephrologist in 2021-  24-hour ambulatory blood monitor July 2022 which showed average awake blood pressure was 143/79 mmHg, heart rate 62 bpm, average asleep blood pressure was 136/71 mmHg, 64 bpm, and average overall blood pressure 142/70 mmHg, 62 bpm.  Echocardiogram 7/15/2022: LVEF 68%, RVSP less than 35 mmHg, no significant structural or functional valvular abnormalities demonstrated, diastolic function normal  Residual class I symptoms with normal ECG July 2022, CCS class 0 angina.NYHA class I-II SOB on exertion March 2023, August 2023, December 2024 with normal ECG  Hyperlipidemia; declined statins, ASCVD 10-year risk is 17.6 %, 9.6 % if treated, wants to think about PCSK9 inhibitor August 2023, December 2023, June 2024, 23.7%, 10.8% with treatment May 2025  Anxiety and depression  Chronic kidney disease stage III with apparent abnormal acceptable renal ultrasound in 2021- data deficit  Systolic heart murmur  Intermittent palpitations  Lumbar back pain  ADHD  RLS  Osteoporosis  At risk for sleep apnea/chronic insomnia with remote sleep study-data deficit, acceptable outpatient sleep oximetry monitor July 2022 with no recommendations for nocturnal oxygen.  Former tobacco use; 1 pack/day x 50 years, quit in 2019  History of TIA x2  Abnormal mammogram with apparent negative biopsy-data deficit  Dizziness, Holter monitor  benign August 2023 showing heart rate ranged between 57-126bpm with average 77bpm. There was no evidence of atrial fibrillation/flutter, ventricular tachycardia, pauses, or heart block. Episode of SVT that only lasted for 12 beats.   [facility] ED visit September 2023 for hyperkalemia  Prediabetes; hemoglobin A1c 5.8% October 2023  Surgical history:  Appendectomy  Breast biopsy/cyst aspiration  Cosmetic surgery  Hysterectomy  Tubal ligation  Breast biopsy    Allergies   Allergen Reactions    Amlodipine Other (See Comments)     Hair loss    Clarithromycin Swelling     Biaxin    Codeine Nausea Only and Other (See Comments)     codeine    Olmesartan     Penicillin G Benzathine Provider Review Needed    Cephalexin Rash and Other (See Comments)     Keflex    Penicillin G Rash    Penicillins Rash       Current Outpatient Medications   Medication Instructions    acetaminophen (TYLENOL 8 HOUR) 650 mg, Oral, Every 8 Hours PRN    acyclovir (ZOVIRAX) 400 MG tablet Take 1 tablet by mouth As Needed.    ALPRAZolam (XANAX) 1 mg, Oral, Nightly PRN    B Complex Vitamins (vitamin b complex) capsule capsule Daily    Biotin 19821 MCG tablet Take  by mouth.    buPROPion XL (WELLBUTRIN XL) 300 mg, Oral, Daily    CHOLECALCIFEROL PO 10,000 Units, Every 7 Days    Diclofenac Sodium (VOLTAREN) 1 % gel gel Apply 4 g topically to the appropriate area as directed As Needed.    Ergocalciferol (VITAMIN D2 PO) Vitamin D2 1,250 mcg (50,000 unit) capsule    fluocinonide (LIDEX) 0.05 % cream 2 Times Daily    gabapentin (NEURONTIN) 300 MG capsule     HYDROcodone-acetaminophen (NORCO) 7.5-325 MG per tablet 1 tablet, As Needed    hydrOXYzine pamoate (VISTARIL) 25 MG capsule TAKE 1 CAPSULE  AT NIGHT AS NEEDED FOR ANXIETY.    ketoconazole (NIZORAL) 2 % shampoo APPLY TO THE AFFECTED AREA(S), LATHER, LEAVE IN PLACE FOR 5 MINUTES, AND THEN RINSE OFF WITH WATER BY TOPICAL ROUTE THREE TIMES A WEEK    mupirocin (BACTROBAN) 2 % ointment Daily    nebivolol (BYSTOLIC) 2.5  mg, Oral, Nightly    PreviDent 5000 Booster Plus 1.1 % paste     telmisartan (MICARDIS) 80 mg, Oral, Daily    zolpidem (AMBIEN) 10 mg, Oral, Nightly PRN         HISTORY OF PRESENT ILLNESS:  The patient is here for 6-month follow-up and is a hypertension clinic patient.   She was supposed to be on PCSK9 inhibitor but discontinued it and wanted to think about restarting it because she did not want to give herself the injections at her last office visit and wants to research it some more.   I informed the patient of her ASCVD 10-year risk of 23.7% and she is aware but declined PCSK9 inhibitor/Leqvio.  At her last appointment she stopped taking her hydrochlorothiazide because she felt like she is getting too dehydrated.  She did not want to start on Bystolic at that time.  She was started on diltiazem 120 mg nightly but then switched back to Bystolic.  When she was seen by her primary care physician March 2025 her blood pressure was 144/84.  Manual blood pressure reading right arm sitting in office today was 144/60.  Patient says it gives her anxiety when she has to check her blood pressure and she just decided to stop doing this.  She states that she has so much back pain and cannot get any relief that it depresses her and she also feels like she cannot do very much activity.  She is supposed to follow-up with pain medicine again 6/2/2025.  Her primary care physician had mentioned her seeing a therapist for her anxiety but patient says that she already sees 12 different doctors and she does not want to go to any more extra appointments.  She says when she gets very anxious then she sometimes feels her heart racing but if she takes Xanax it improves.  She feels like she is tired in the morning and it takes her a couple hours to wake up.  However she is taking gabapentin, Norco, Bystolic, and will occasionally take Ambien at night.  She had labs a couple months ago and her potassium was elevated but then on recheck it  "was still elevated but less than previous readings.  Patient is trying to drink more water because her creatinine was also elevated.  Patient feels frustrated and does not feel like her anxiety medicine is helping either.    ROS   All other systems reviewed and otherwise negative.    Procedures       Objective:       Vitals:    05/22/25 1416 05/22/25 1458   BP: 152/84 144/60   BP Location: Right arm Right arm   Patient Position: Sitting Sitting   Cuff Size: Adult    Pulse: 69    SpO2: 98%    Weight: 61.1 kg (134 lb 12.8 oz)    Height: 157.5 cm (62.01\")      Body mass index is 24.65 kg/m².  Wt Readings from Last 2 Encounters:   05/22/25 61.1 kg (134 lb 12.8 oz)   03/14/25 60.3 kg (133 lb)        Constitutional:       Appearance: Healthy appearance. Not in distress.   Neck:      Vascular: No JVR. JVD normal.   Pulmonary:      Effort: Pulmonary effort is normal.      Breath sounds: Normal breath sounds. No wheezing. No rhonchi. No rales.   Chest:      Chest wall: Not tender to palpatation.   Cardiovascular:      PMI at left midclavicular line. Normal rate. Regular rhythm. Normal S1. Normal S2.       Murmurs: There is a grade 1/6 systolic murmur at the URSB.      No gallop.  No click. No rub.   Pulses:     Intact distal pulses.   Edema:     Peripheral edema absent.   Abdominal:      General: Bowel sounds are normal.      Palpations: Abdomen is soft.      Tenderness: There is no abdominal tenderness.   Musculoskeletal: Normal range of motion.         General: No tenderness. Skin:     General: Skin is warm and dry.   Neurological:      General: No focal deficit present.      Mental Status: Alert and oriented to person, place and time.           Lab Review:   Lab Results   Component Value Date    GLUCOSE 96 03/24/2025    BUN 24 (H) 03/24/2025    CREATININE 1.44 (H) 03/24/2025    EGFRIFNONA 51 (L) 11/19/2021    EGFRIFAFRI 58 (L) 11/19/2021    BCR 16.7 03/24/2025    CO2 23.6 03/24/2025    CALCIUM 10.0 03/24/2025    ALBUMIN " 4.2 03/24/2025    AST 21 03/24/2025    ALT 7 03/24/2025       Lab Results   Component Value Date    WBC 7.49 10/17/2024    HGB 15.1 10/17/2024    HCT 42.6 10/17/2024    MCV 92.0 10/17/2024     10/17/2024       Lab Results   Component Value Date    HGBA1C 5.70 (H) 03/14/2025       Lab Results   Component Value Date    TSH 1.860 10/17/2024       Lab Results   Component Value Date    CHOL 234 (H) 10/17/2024    CHOL 226 (H) 06/11/2024     Lab Results   Component Value Date    TRIG 173 (H) 10/17/2024    TRIG 158 (H) 06/11/2024     Lab Results   Component Value Date    HDL 84 (H) 10/17/2024    HDL 86 (H) 06/11/2024     Lab Results   Component Value Date     (H) 10/17/2024     (H) 06/11/2024           Lab Results   Component Value Date    BNP 19 05/24/2014                 Results for orders placed in visit on 07/05/22    Adult Transthoracic Echo Complete W/ Cont if Necessary Per Protocol    Interpretation Summary  · Estimated left ventricular EF = 68% Left ventricular ejection fraction appears to be 66 - 70%. Left ventricular systolic function is normal.  · Left ventricular diastolic function was normal.  · Estimated right ventricular systolic pressure from tricuspid regurgitation is normal (<35 mmHg).  · Normal right ventricular cavity size, wall thickness, systolic function and septal motion noted.  · No evidence of pulmonary hypertension is present.  · There is no evidence of pericardial effusion.  · No significant structural or functional valvular abnormalities demonstrated.                Assessment:    Patient with fair functional status on limited activity. We will defer additional diagnostic or therapeutic intervention from a cardiac perspective at this time.  Patient's blood pressure is elevated and she has had multiple antihypertensive intolerances/allergies.  I will decrease telmisartan to 40 mg daily in view of recent hyperkalemia and add doxazosin 2 mg nightly.  She will discontinue  Bystolic for now.  Patient to get repeat labs in 1-1.5 weeks.  She will also monitor blood pressure daily for the next week and call back with readings. Recommend PCSK9 inhibitor or Leqvio but patient declined.  Encouraged the patient to stay hydrated in view of CKD.     Diagnosis Plan   1. Essential hypertension   Patient's blood pressure is elevated and she has had multiple antihypertensive intolerances/allergies.  I will decrease telmisartan to 40 mg daily in view of recent hyperkalemia and add doxazosin 2 mg nightly.  She will discontinue Bystolic for now.  Patient to get repeat labs in 1-1.5 weeks.  She will also monitor blood pressure daily for the next week and call back with readings.      2. Hyperlipidemia LDL goal <100  Abnormal lipid panel October 2024,Recommend PCSK9 inhibitor or Leqvio but patient declined      3. Heart murmur  No significant valvular abnormalities on echocardiogram 2022             Plan:         Patient to continue current medications and close follow up with the above providers.  Tentative cardiology follow up in August 2025 or patient may return sooner PRN.  Recommend PCSK9 inhibitor or Leqvio but patient declined  Decrease telmisartan to 40 mg daily  Doxazosin 2 mg nightly  Patient to call back in 1 week with blood pressure and heart rate readings  Repeat labs in 1-2 weeks  Discontinue Bystolic for now  Encouraged the patient to stay hydrated in view of CKD.      Electronically signed by WELLINGTON Holguin, 05/22/25, 3:48 PM EDT.

## 2025-05-22 ENCOUNTER — OFFICE VISIT (OUTPATIENT)
Dept: CARDIOLOGY | Facility: CLINIC | Age: 75
End: 2025-05-22
Payer: MEDICARE

## 2025-05-22 VITALS
HEIGHT: 62 IN | SYSTOLIC BLOOD PRESSURE: 144 MMHG | HEART RATE: 69 BPM | DIASTOLIC BLOOD PRESSURE: 60 MMHG | WEIGHT: 134.8 LBS | OXYGEN SATURATION: 98 % | BODY MASS INDEX: 24.8 KG/M2

## 2025-05-22 DIAGNOSIS — E78.5 HYPERLIPIDEMIA LDL GOAL <100: ICD-10-CM

## 2025-05-22 DIAGNOSIS — R01.1 HEART MURMUR: ICD-10-CM

## 2025-05-22 DIAGNOSIS — I10 ESSENTIAL HYPERTENSION: Primary | ICD-10-CM

## 2025-05-22 RX ORDER — DOXAZOSIN 2 MG/1
2 TABLET ORAL NIGHTLY
Qty: 90 TABLET | Refills: 3 | Status: SHIPPED | OUTPATIENT
Start: 2025-05-22

## 2025-05-22 RX ORDER — TELMISARTAN 40 MG/1
40 TABLET ORAL DAILY
Qty: 90 TABLET | Refills: 3 | Status: SHIPPED | OUTPATIENT
Start: 2025-05-22

## 2025-06-17 DIAGNOSIS — F41.9 ANXIETY AND DEPRESSION: ICD-10-CM

## 2025-06-17 DIAGNOSIS — F32.A ANXIETY AND DEPRESSION: ICD-10-CM

## 2025-06-17 RX ORDER — BUPROPION HYDROCHLORIDE 300 MG/1
300 TABLET ORAL DAILY
Qty: 90 TABLET | Refills: 0 | Status: CANCELLED | OUTPATIENT
Start: 2025-06-17

## 2025-06-19 ENCOUNTER — TELEPHONE (OUTPATIENT)
Dept: INTERNAL MEDICINE | Facility: CLINIC | Age: 75
End: 2025-06-19
Payer: MEDICARE

## 2025-06-19 DIAGNOSIS — F32.A ANXIETY AND DEPRESSION: ICD-10-CM

## 2025-06-19 DIAGNOSIS — F41.9 ANXIETY AND DEPRESSION: ICD-10-CM

## 2025-06-19 RX ORDER — BUPROPION HYDROCHLORIDE 300 MG/1
300 TABLET ORAL DAILY
Qty: 30 TABLET | Refills: 0 | Status: SHIPPED | OUTPATIENT
Start: 2025-06-19

## 2025-06-19 NOTE — TELEPHONE ENCOUNTER
"Provider: DR GTZ    Caller: Deidre Gomez \"Fatoumata\"    Relationship to Patient: Self    Pharmacy: [facility]    Phone Number: [phone]     Reason for Call:THE PATIENT'S WELLBUTRIN NEEDS TO BE SENT TO THE LOCAL PHARMACY AS CENTER [facility] IS CLOSED UNTIL 06/24/25.  "

## 2025-07-17 DIAGNOSIS — F41.9 ANXIETY AND DEPRESSION: ICD-10-CM

## 2025-07-17 DIAGNOSIS — F32.A ANXIETY AND DEPRESSION: ICD-10-CM

## 2025-07-17 RX ORDER — BUPROPION HYDROCHLORIDE 300 MG/1
300 TABLET ORAL DAILY
Qty: 30 TABLET | Refills: 0 | OUTPATIENT
Start: 2025-07-17

## 2025-07-18 ENCOUNTER — OFFICE VISIT (OUTPATIENT)
Dept: INTERNAL MEDICINE | Facility: CLINIC | Age: 75
End: 2025-07-18
Payer: MEDICARE

## 2025-07-18 VITALS
DIASTOLIC BLOOD PRESSURE: 78 MMHG | TEMPERATURE: 97.7 F | WEIGHT: 133.4 LBS | HEIGHT: 62 IN | SYSTOLIC BLOOD PRESSURE: 134 MMHG | OXYGEN SATURATION: 99 % | BODY MASS INDEX: 24.55 KG/M2 | HEART RATE: 76 BPM

## 2025-07-18 DIAGNOSIS — I10 ESSENTIAL HYPERTENSION: ICD-10-CM

## 2025-07-18 DIAGNOSIS — F51.04 CHRONIC INSOMNIA: ICD-10-CM

## 2025-07-18 DIAGNOSIS — R73.03 PREDIABETES: ICD-10-CM

## 2025-07-18 DIAGNOSIS — E78.5 HYPERLIPIDEMIA LDL GOAL <100: Primary | ICD-10-CM

## 2025-07-18 DIAGNOSIS — F41.9 ANXIETY AND DEPRESSION: ICD-10-CM

## 2025-07-18 DIAGNOSIS — F32.A ANXIETY AND DEPRESSION: ICD-10-CM

## 2025-07-18 RX ORDER — BUPROPION HYDROCHLORIDE 300 MG/1
300 TABLET ORAL DAILY
Qty: 90 TABLET | Refills: 0 | Status: SHIPPED | OUTPATIENT
Start: 2025-07-18

## 2025-07-18 RX ORDER — ALPRAZOLAM 1 MG/1
1 TABLET ORAL NIGHTLY PRN
Qty: 30 TABLET | Refills: 2 | Status: CANCELLED | OUTPATIENT
Start: 2025-07-18

## 2025-07-18 RX ORDER — METHOCARBAMOL 750 MG/1
750 TABLET, FILM COATED ORAL 3 TIMES DAILY
COMMUNITY
Start: 2025-06-10

## 2025-07-18 RX ORDER — NEBIVOLOL 2.5 MG/1
2.5 TABLET ORAL DAILY
COMMUNITY
Start: 2025-04-30

## 2025-07-18 RX ORDER — ZOLPIDEM TARTRATE 10 MG/1
10 TABLET ORAL NIGHTLY PRN
Qty: 30 TABLET | Refills: 2 | Status: CANCELLED | OUTPATIENT
Start: 2025-07-18

## 2025-07-18 RX ORDER — ESTRADIOL 0.1 MG/G
2 CREAM VAGINAL DAILY
COMMUNITY
Start: 2025-06-19 | End: 2026-06-19

## 2025-07-18 RX ORDER — HYDROXYZINE PAMOATE 25 MG/1
25 CAPSULE ORAL NIGHTLY PRN
Qty: 30 CAPSULE | Refills: 0 | Status: CANCELLED | OUTPATIENT
Start: 2025-07-18

## 2025-07-18 RX ORDER — HYDROCHLOROTHIAZIDE 25 MG/1
TABLET ORAL
COMMUNITY

## 2025-07-18 NOTE — PROGRESS NOTES
"Subjective   Deidre Gomez is a 74 y.o. female.   Chief Complaint   Patient presents with    Hyperlipidemia    Hypertension    Insomnia    Prediabetes       History of Present Illness   Here for f/u on chronic conditions: HTN, HLP, prediabetes, and insomnia. Insomnia controlled with Ambien. Prediabetes controlled with diet. HTN better control with Micardis. HLP  and statin intolerant.   Former smoker quit 6 years ago. Does not want to have CT lung cancer screen.   The following portions of the patient's history were reviewed and updated as appropriate: allergies, current medications, past family history, past medical history, past social history, past surgical history, and problem list.    Review of Systems   Respiratory:  Negative for cough and shortness of breath.    Cardiovascular:  Negative for chest pain and leg swelling.   Gastrointestinal:  Negative for abdominal pain.   Musculoskeletal:  Positive for back pain (sees pain management).   Psychiatric/Behavioral:  Positive for sleep disturbance.      /78 (BP Location: Left arm, Patient Position: Sitting, Cuff Size: Adult)   Pulse 76   Temp 97.7 °F (36.5 °C) (Temporal)   Ht 157.5 cm (62.01\")   Wt 60.5 kg (133 lb 6.4 oz)   LMP  (LMP Unknown)   SpO2 99%   BMI 24.39 kg/m²     Objective   Physical Exam  Vitals reviewed.   Cardiovascular:      Rate and Rhythm: Normal rate and regular rhythm.   Pulmonary:      Effort: No respiratory distress.      Breath sounds: No wheezing.   Neurological:      Mental Status: She is alert.   Psychiatric:         Behavior: Behavior normal.       Assessment & Plan   Diagnoses and all orders for this visit:    1. Hyperlipidemia LDL goal <100 (Primary)  -     Lipid Panel; Future  Statin intolerant  2. Chronic insomnia  Continue Ambien. The patient has read and signed the [facility] Controlled Substance Contract.  I will continue to see patient for regular follow up appointments.  They are well controlled on their " medication.  SCOTT is updated every 3 months. The patient is aware of the potential for addiction and dependence.    3. Anxiety and depression  -     buPROPion XL (WELLBUTRIN XL) 300 MG 24 hr tablet; Take 1 tablet by mouth Daily.  Dispense: 90 tablet; Refill: 0  -     Ambulatory Referral to Behavioral Health  Continue Wellbutrin XL  4. Essential hypertension  -     Comprehensive Metabolic Panel; Future  Continue Micardis  5. Prediabetes  -     Hemoglobin A1c; Future